# Patient Record
Sex: FEMALE | Race: BLACK OR AFRICAN AMERICAN | Employment: FULL TIME | URBAN - METROPOLITAN AREA
[De-identification: names, ages, dates, MRNs, and addresses within clinical notes are randomized per-mention and may not be internally consistent; named-entity substitution may affect disease eponyms.]

---

## 2017-10-12 ENCOUNTER — HOSPITAL ENCOUNTER (EMERGENCY)
Age: 37
Discharge: HOME | End: 2017-10-12
Payer: MEDICAID

## 2017-10-12 VITALS
TEMPERATURE: 98.2 F | OXYGEN SATURATION: 96 % | HEART RATE: 66 BPM | SYSTOLIC BLOOD PRESSURE: 130 MMHG | HEIGHT: 60 IN | BODY MASS INDEX: 43.59 KG/M2 | RESPIRATION RATE: 16 BRPM | WEIGHT: 222 LBS | DIASTOLIC BLOOD PRESSURE: 81 MMHG

## 2017-10-12 DIAGNOSIS — K59.1 FUNCTIONAL DIARRHEA: ICD-10-CM

## 2017-10-12 DIAGNOSIS — R11.2 NON-INTRACTABLE VOMITING WITH NAUSEA, UNSPECIFIED VOMITING TYPE: Primary | ICD-10-CM

## 2017-10-12 DIAGNOSIS — N39.0 URINARY TRACT INFECTION WITHOUT HEMATURIA, SITE UNSPECIFIED: ICD-10-CM

## 2017-10-12 LAB
ALBUMIN SERPL-MCNC: 4.1 G/DL (ref 3.9–4.9)
ALP BLD-CCNC: 59 U/L (ref 40–130)
ALT SERPL-CCNC: 18 U/L (ref 0–33)
ANION GAP SERPL CALCULATED.3IONS-SCNC: 14 MEQ/L (ref 7–13)
AST SERPL-CCNC: 28 U/L (ref 0–35)
BACTERIA: ABNORMAL /HPF
BILIRUB SERPL-MCNC: 0.3 MG/DL (ref 0–1.2)
BILIRUBIN URINE: NEGATIVE
BLOOD, URINE: ABNORMAL
BUN BLDV-MCNC: 9 MG/DL (ref 6–20)
CALCIUM SERPL-MCNC: 9 MG/DL (ref 8.6–10.2)
CHLORIDE BLD-SCNC: 105 MEQ/L (ref 98–107)
CLARITY: CLEAR
CO2: 21 MEQ/L (ref 22–29)
COLOR: YELLOW
CREAT SERPL-MCNC: 0.51 MG/DL (ref 0.5–0.9)
EPITHELIAL CELLS, UA: ABNORMAL /HPF
GFR AFRICAN AMERICAN: >60
GFR NON-AFRICAN AMERICAN: >60
GLOBULIN: 3.1 G/DL (ref 2.3–3.5)
GLUCOSE BLD-MCNC: 99 MG/DL (ref 74–109)
GLUCOSE URINE: NEGATIVE MG/DL
HCT VFR BLD CALC: 38 % (ref 37–47)
HEMOGLOBIN: 12.3 G/DL (ref 12–16)
KETONES, URINE: NEGATIVE MG/DL
LEUKOCYTE ESTERASE, URINE: ABNORMAL
MCH RBC QN AUTO: 27 PG (ref 27–31.3)
MCHC RBC AUTO-ENTMCNC: 32.4 % (ref 33–37)
MCV RBC AUTO: 83.5 FL (ref 82–100)
MUCUS: PRESENT
NITRITE, URINE: NEGATIVE
PDW BLD-RTO: 14.1 % (ref 11.5–14.5)
PH UA: 6 (ref 5–9)
PLATELET # BLD: 217 K/UL (ref 130–400)
POTASSIUM SERPL-SCNC: 4 MEQ/L (ref 3.5–5.1)
PROTEIN UA: NEGATIVE MG/DL
RBC # BLD: 4.56 M/UL (ref 4.2–5.4)
RBC UA: ABNORMAL /HPF (ref 0–2)
SODIUM BLD-SCNC: 140 MEQ/L (ref 132–144)
SPECIFIC GRAVITY UA: 1.03 (ref 1–1.03)
TOTAL PROTEIN: 7.2 G/DL (ref 6.4–8.1)
URINE REFLEX TO CULTURE: YES
UROBILINOGEN, URINE: 1 E.U./DL
WBC # BLD: 4.8 K/UL (ref 4.8–10.8)
WBC UA: ABNORMAL /HPF (ref 0–5)

## 2017-10-12 PROCEDURE — 96361 HYDRATE IV INFUSION ADD-ON: CPT

## 2017-10-12 PROCEDURE — 87086 URINE CULTURE/COLONY COUNT: CPT

## 2017-10-12 PROCEDURE — 99284 EMERGENCY DEPT VISIT MOD MDM: CPT

## 2017-10-12 PROCEDURE — 96374 THER/PROPH/DIAG INJ IV PUSH: CPT

## 2017-10-12 PROCEDURE — 36415 COLL VENOUS BLD VENIPUNCTURE: CPT

## 2017-10-12 PROCEDURE — 2580000003 HC RX 258: Performed by: PHYSICIAN ASSISTANT

## 2017-10-12 PROCEDURE — 81001 URINALYSIS AUTO W/SCOPE: CPT

## 2017-10-12 PROCEDURE — 85027 COMPLETE CBC AUTOMATED: CPT

## 2017-10-12 PROCEDURE — 6360000002 HC RX W HCPCS: Performed by: PHYSICIAN ASSISTANT

## 2017-10-12 PROCEDURE — 80053 COMPREHEN METABOLIC PANEL: CPT

## 2017-10-12 RX ORDER — ONDANSETRON 4 MG/1
4 TABLET, ORALLY DISINTEGRATING ORAL EVERY 8 HOURS PRN
Qty: 12 TABLET | Refills: 0 | Status: SHIPPED | OUTPATIENT
Start: 2017-10-12 | End: 2017-10-24

## 2017-10-12 RX ORDER — ONDANSETRON 2 MG/ML
4 INJECTION INTRAMUSCULAR; INTRAVENOUS ONCE
Status: COMPLETED | OUTPATIENT
Start: 2017-10-12 | End: 2017-10-12

## 2017-10-12 RX ORDER — NITROFURANTOIN 25; 75 MG/1; MG/1
100 CAPSULE ORAL 2 TIMES DAILY
Qty: 10 CAPSULE | Refills: 0 | Status: SHIPPED | OUTPATIENT
Start: 2017-10-12 | End: 2017-10-17

## 2017-10-12 RX ORDER — 0.9 % SODIUM CHLORIDE 0.9 %
1000 INTRAVENOUS SOLUTION INTRAVENOUS ONCE
Status: COMPLETED | OUTPATIENT
Start: 2017-10-12 | End: 2017-10-12

## 2017-10-12 RX ADMIN — SODIUM CHLORIDE 1000 ML: 9 INJECTION, SOLUTION INTRAVENOUS at 16:18

## 2017-10-12 RX ADMIN — ONDANSETRON 4 MG: 2 INJECTION INTRAMUSCULAR; INTRAVENOUS at 16:19

## 2017-10-12 ASSESSMENT — PAIN DESCRIPTION - PAIN TYPE: TYPE: ACUTE PAIN

## 2017-10-12 ASSESSMENT — PAIN DESCRIPTION - ORIENTATION: ORIENTATION: LOWER

## 2017-10-12 ASSESSMENT — ENCOUNTER SYMPTOMS
SHORTNESS OF BREATH: 0
ABDOMINAL PAIN: 0
NAUSEA: 1
SORE THROAT: 0
EYE DISCHARGE: 0
DIARRHEA: 1
RHINORRHEA: 0
CONSTIPATION: 0
ABDOMINAL DISTENTION: 0
COLOR CHANGE: 0
VOMITING: 1

## 2017-10-12 ASSESSMENT — PAIN DESCRIPTION - FREQUENCY: FREQUENCY: CONTINUOUS

## 2017-10-12 ASSESSMENT — PAIN DESCRIPTION - DESCRIPTORS: DESCRIPTORS: ACHING

## 2017-10-12 ASSESSMENT — PAIN SCALES - GENERAL: PAINLEVEL_OUTOF10: 6

## 2017-10-12 ASSESSMENT — PAIN DESCRIPTION - LOCATION: LOCATION: ABDOMEN

## 2017-10-12 ASSESSMENT — PAIN DESCRIPTION - ONSET: ONSET: ON-GOING

## 2017-10-12 NOTE — ED NOTES
Pt was difficult Iv access had to use vein light and assistance of another nurse     Linda Weston RN  10/12/17 9999

## 2017-10-12 NOTE — ED PROVIDER NOTES
of systems was reviewed and negative. PAST MEDICAL HISTORY   History reviewed. No pertinent past medical history. SURGICAL HISTORY     History reviewed. No pertinent surgical history. CURRENT MEDICATIONS       Previous Medications    No medications on file       ALLERGIES     Metronidazole    FAMILY HISTORY     No family history on file. SOCIAL HISTORY       Social History     Social History    Marital status: Single     Spouse name: N/A    Number of children: N/A    Years of education: N/A     Social History Main Topics    Smoking status: Former Smoker    Smokeless tobacco: Former User    Alcohol use Yes      Comment: On \"occasion liquor\"    Drug use: No    Sexual activity: Not Asked     Other Topics Concern    None     Social History Narrative    None       SCREENINGS             PHYSICAL EXAM    (up to 7 for level 4, 8 or more for level 5)     ED Triage Vitals [10/12/17 1454]   BP Temp Temp Source Pulse Resp SpO2 Height Weight   133/89 98.2 °F (36.8 °C) Oral 81 18 98 % 5' (1.524 m) 222 lb (100.7 kg)       Physical Exam   Constitutional: She is oriented to person, place, and time. She appears well-developed and well-nourished. Displays no acute distress   HENT:   Head: Normocephalic. Eyes: Pupils are equal, round, and reactive to light. Neck: Normal range of motion. Neck supple. No JVD present. No tracheal deviation present. Cardiovascular: Normal rate. Pulmonary/Chest: Effort normal. No respiratory distress. She has no wheezes. She has no rales. She exhibits no tenderness. Abdominal: Soft. Bowel sounds are normal. She exhibits no distension and no mass. There is no tenderness. There is no rebound and no guarding. Musculoskeletal: Normal range of motion. She exhibits no edema or deformity. Neurological: She is alert and oriented to person, place, and time. Coordination normal.   Skin: Skin is warm and dry. Psychiatric: She has a normal mood and affect. DIAGNOSTIC RESULTS     EKG: All EKG's are interpreted by the Emergency Department Physician who either signs or Co-signs this chart in the absence of a cardiologist.        RADIOLOGY:   Non-plain film images such as CT, Ultrasound and MRI are read by the radiologist. Plain radiographic images are visualized and preliminarily interpreted by the emergency physician with the below findings:        Interpretation per the Radiologist below, if available at the time of this note:    No orders to display         ED BEDSIDE ULTRASOUND:   Performed by ED Physician - none    LABS:  Labs Reviewed   COMPREHENSIVE METABOLIC PANEL - Abnormal; Notable for the following:        Result Value    CO2 21 (*)     Anion Gap 14 (*)     All other components within normal limits   CBC - Abnormal; Notable for the following:     MCHC 32.4 (*)     All other components within normal limits   URINE RT REFLEX TO CULTURE - Abnormal; Notable for the following:     Blood, Urine LARGE (*)     Leukocyte Esterase, Urine SMALL (*)     All other components within normal limits   MICROSCOPIC URINALYSIS - Abnormal; Notable for the following:     RBC, UA 3-5 (*)     All other components within normal limits   URINE CULTURE       All other labs were within normal range or not returned as of this dictation. EMERGENCY DEPARTMENT COURSE and DIFFERENTIAL DIAGNOSIS/MDM:   Vitals:    Vitals:    10/12/17 1454   BP: 133/89   Pulse: 81   Resp: 18   Temp: 98.2 °F (36.8 °C)   TempSrc: Oral   SpO2: 98%   Weight: 222 lb (100.7 kg)   Height: 5' (1.524 m)         ED Course      MDM  Number of Diagnoses or Management Options  Functional diarrhea:   Non-intractable vomiting with nausea, unspecified vomiting type:   Urinary tract infection without hematuria, site unspecified:   Diagnosis management comments: Patient displayed no episodes of vomiting or diarrhea while in the emergency department labs reviewed symptoms are most consistent with that of viral syndrome. Patient was given a prescription for Zofran as well as Macrobid K she didn't have leukocytes in her urine so will cover for urinary tract infection. Patient is also requesting a work excuse which she was given and advised to follow up with her regular family physician next 48 hours. CRITICAL CARE TIME   Total Critical Care time was 0 minutes, excluding separately reportable procedures. There was a high probability of clinically significant/life threatening deterioration in the patient's condition which required my urgent intervention. CONSULTS:  None    PROCEDURES:  Unless otherwise noted below, none     Procedures    FINAL IMPRESSION      1. Non-intractable vomiting with nausea, unspecified vomiting type    2. Functional diarrhea    3. Urinary tract infection without hematuria, site unspecified          DISPOSITION/PLAN   DISPOSITION Decision to Discharge    PATIENT REFERRED TO:  [facility]    In 2 days        DISCHARGE MEDICATIONS:  New Prescriptions    NITROFURANTOIN, MACROCRYSTAL-MONOHYDRATE, (MACROBID) 100 MG CAPSULE    Take 1 capsule by mouth 2 times daily for 5 days    ONDANSETRON (ZOFRAN ODT) 4 MG DISINTEGRATING TABLET    Take 1 tablet by mouth every 8 hours as needed for Nausea     Attestation: The Prescription Monitoring Report for this patient was reviewed today.  (Linh Zamudio PA-C)    (Please note that portions of this note were completed with a voice recognition program.  Efforts were made to edit the dictations but occasionally words are mis-transcribed.)    Lihn Zamudio PA-C (electronically signed)  Attending Emergency Physician         Linh Zamudio PA-C  10/12/17 [address] [place] [address]

## 2017-10-14 LAB — URINE CULTURE, ROUTINE: NORMAL

## 2017-12-19 ENCOUNTER — HOSPITAL ENCOUNTER (EMERGENCY)
Age: 37
Discharge: HOME | End: 2017-12-19
Payer: MEDICARE

## 2017-12-19 VITALS
SYSTOLIC BLOOD PRESSURE: 130 MMHG | BODY MASS INDEX: 44.14 KG/M2 | RESPIRATION RATE: 16 BRPM | TEMPERATURE: 97.7 F | HEART RATE: 73 BPM | OXYGEN SATURATION: 98 % | WEIGHT: 226 LBS | DIASTOLIC BLOOD PRESSURE: 91 MMHG

## 2017-12-19 DIAGNOSIS — R11.2 NON-INTRACTABLE VOMITING WITH NAUSEA, UNSPECIFIED VOMITING TYPE: Primary | ICD-10-CM

## 2017-12-19 PROCEDURE — 99283 EMERGENCY DEPT VISIT LOW MDM: CPT

## 2017-12-19 ASSESSMENT — ENCOUNTER SYMPTOMS
COLOR CHANGE: 0
SORE THROAT: 0
VOMITING: 1
BACK PAIN: 0
EYE DISCHARGE: 0
ABDOMINAL DISTENTION: 0
CONSTIPATION: 0
NAUSEA: 1
ABDOMINAL PAIN: 0
SHORTNESS OF BREATH: 0
RHINORRHEA: 0

## 2017-12-19 NOTE — ED TRIAGE NOTES
Pt c/o nausea, vomiting and diarrhea. Was seen at [facility] and given one day off of work pt went to work today and vomited. 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

## 2017-12-19 NOTE — ED PROVIDER NOTES
[facility] ED  eMERGENCY dEPARTMENT eNCOUnter      Pt Name: Nickolas Talley  MRN: [id number]  [place] 1980  Date of evaluation: 12/19/2017  Provider: Richardson Montana PA-C    CHIEF COMPLAINT       Chief Complaint   Patient presents with    Emesis     diarrhea         HISTORY OF PRESENT ILLNESS   (Location/Symptom, Timing/Onset, Context/Setting, Quality, Duration, Modifying Factors, Severity)  Note limiting factors. Nickolas Talley is a 40 y.o. female who presents to the emergency department With the complaint of nausea and vomiting. Patient states that nausea vomiting started yesterday for her she was seen at [facility] she was given a prescription for Zofran. She states she is still feeling nauseated this time she was supposed to go back in to work today but she works at [employer] and she feels that she is not able to do her job safely defer nausea and vomiting, and is requesting additional work note for today off. She states she is able to take oral fluids but is still having intermittent periods of nausea which is controlled by her Zofran is given by [facility]. She has no other additional complaints no chest pain or shortness of breath no dizziness no fevers. HPI    Nursing Notes were reviewed. REVIEW OF SYSTEMS    (2-9 systems for level 4, 10 or more for level 5)     Review of Systems   Constitutional: Negative for activity change, appetite change, fatigue and fever. HENT: Negative for congestion, ear discharge, ear pain, nosebleeds, rhinorrhea and sore throat. Eyes: Negative for discharge. Respiratory: Negative for shortness of breath. Cardiovascular: Negative for chest pain, palpitations and leg swelling. Gastrointestinal: Positive for nausea and vomiting. Negative for abdominal distention, abdominal pain and constipation. Genitourinary: Negative for difficulty urinating, dysuria, flank pain and urgency.    Musculoskeletal: Negative for arthralgias, back pain, gait problem, joint swelling, myalgias and neck pain. Skin: Negative for color change, pallor, rash and wound. Neurological: Negative for dizziness, tremors, syncope, weakness, numbness and headaches. Psychiatric/Behavioral: Negative for agitation and confusion. Except as noted above the remainder of the review of systems was reviewed and negative. PAST MEDICAL HISTORY   History reviewed. No pertinent past medical history. SURGICAL HISTORY     History reviewed. No pertinent surgical history. CURRENT MEDICATIONS       Previous Medications    No medications on file       ALLERGIES     Metronidazole    FAMILY HISTORY     History reviewed. No pertinent family history. SOCIAL HISTORY       Social History     Social History    Marital status: Single     Spouse name: N/A    Number of children: N/A    Years of education: N/A     Social History Main Topics    Smoking status: Former Smoker    Smokeless tobacco: Former User    Alcohol use Yes      Comment: On \"occasion liquor\"    Drug use: No    Sexual activity: Not Asked     Other Topics Concern    None     Social History Narrative    None       SCREENINGS             PHYSICAL EXAM    (up to 7 for level 4, 8 or more for level 5)     ED Triage Vitals [12/19/17 0716]   BP Temp Temp Source Pulse Resp SpO2 Height Weight   (!) 130/91 97.7 °F (36.5 °C) Oral 73 16 98 % -- 226 lb (102.5 kg)       Physical Exam   Constitutional: She is oriented to person, place, and time. She appears well-developed and well-nourished. HENT:   Head: Normocephalic. Eyes: EOM are normal. Pupils are equal, round, and reactive to light. Neck: Normal range of motion. Neck supple. No JVD present. No tracheal deviation present. Cardiovascular: Normal rate. Pulmonary/Chest: Effort normal. No respiratory distress. She has no wheezes. She has no rales. She exhibits no tenderness. Abdominal: Soft.  Bowel sounds are normal. She exhibits no distension and no mass. There is no tenderness. There is no rebound and no guarding. Musculoskeletal: Normal range of motion. She exhibits no edema or deformity. Neurological: She is oriented to person, place, and time. Coordination normal.   Skin: Skin is warm and dry. Psychiatric: She has a normal mood and affect. DIAGNOSTIC RESULTS     EKG: All EKG's are interpreted by the Emergency Department Physician who either signs or Co-signs this chart in the absence of a cardiologist.        RADIOLOGY:   Non-plain film images such as CT, Ultrasound and MRI are read by the radiologist. Plain radiographic images are visualized and preliminarily interpreted by the emergency physician with the below findings:        Interpretation per the Radiologist below, if available at the time of this note:    No orders to display         ED BEDSIDE ULTRASOUND:   Performed by ED Physician - none    LABS:  Labs Reviewed - No data to display    All other labs were within normal range or not returned as of this dictation. EMERGENCY DEPARTMENT COURSE and DIFFERENTIAL DIAGNOSIS/MDM:   Vitals:    Vitals:    12/19/17 0716   BP: (!) 130/91   Pulse: 73   Resp: 16   Temp: 97.7 °F (36.5 °C)   TempSrc: Oral   SpO2: 98%   Weight: 226 lb (102.5 kg)         ED Course      MDM  Number of Diagnoses or Management Options  Non-intractable vomiting with nausea, unspecified vomiting type:   Diagnosis management comments: Patient visit today is only requesting a work note for an additional day off due to her nausea and vomiting. She states this is been ongoing for the last 2 days and she was supposed to return to work today but she feels that since she works at [email or web address] she could not complete her job due to her nausea and vomiting which still remains.   She was seen in [facility]"" yesterday she was given a prescription for Zofran she states that Zofran doesn't work for her but she still has breakthrough and She gets nauseated. She feels she cannot complete her job safely and requests additional work note for today off as well. She has no other additional complaints. She states she does have medications remaining from her visit [facility] yesterday she does not need a refill. CRITICAL CARE TIME   Total Critical Care time was 0 minutes, excluding separately reportable procedures. There was a high probability of clinically significant/life threatening deterioration in the patient's condition which required my urgent intervention. CONSULTS:  None    PROCEDURES:  Unless otherwise noted below, none     Procedures    FINAL IMPRESSION      1.  Non-intractable vomiting with nausea, unspecified vomiting type          DISPOSITION/PLAN   DISPOSITION Decision to Discharge    PATIENT REFERRED TO:  [facility]    In 2 days        DISCHARGE MEDICATIONS:  New Prescriptions    No medications on file          (Please note that portions of this note were completed with a voice recognition program.  Efforts were made to edit the dictations but occasionally words are mis-transcribed.)    Omaira Coyne PA-C (electronically signed)  Attending Emergency Physician         Omaira Coyne PA-C  12/19/17 5499

## 2018-09-03 ENCOUNTER — HOSPITAL ENCOUNTER (EMERGENCY)
Age: 38
Discharge: HOME | End: 2018-09-04
Payer: MEDICARE

## 2018-09-03 VITALS
DIASTOLIC BLOOD PRESSURE: 83 MMHG | BODY MASS INDEX: 47.12 KG/M2 | HEIGHT: 60 IN | HEART RATE: 69 BPM | TEMPERATURE: 98.2 F | WEIGHT: 240 LBS | RESPIRATION RATE: 17 BRPM | SYSTOLIC BLOOD PRESSURE: 134 MMHG | OXYGEN SATURATION: 96 %

## 2018-09-03 DIAGNOSIS — R19.7 NAUSEA VOMITING AND DIARRHEA: ICD-10-CM

## 2018-09-03 DIAGNOSIS — R11.2 NAUSEA VOMITING AND DIARRHEA: ICD-10-CM

## 2018-09-03 DIAGNOSIS — N30.00 ACUTE CYSTITIS WITHOUT HEMATURIA: Primary | ICD-10-CM

## 2018-09-03 LAB
ALBUMIN SERPL-MCNC: 4.1 G/DL (ref 3.9–4.9)
ALP BLD-CCNC: 67 U/L (ref 40–130)
ALT SERPL-CCNC: 16 U/L (ref 0–33)
AMYLASE: 71 U/L (ref 28–100)
ANION GAP SERPL CALCULATED.3IONS-SCNC: 11 MEQ/L (ref 7–13)
AST SERPL-CCNC: 24 U/L (ref 0–35)
BASOPHILS ABSOLUTE: 0.1 K/UL (ref 0–0.2)
BASOPHILS RELATIVE PERCENT: 1.1 %
BILIRUB SERPL-MCNC: <0.2 MG/DL (ref 0–1.2)
BILIRUBIN URINE: NEGATIVE
BLOOD, URINE: NEGATIVE
BUN BLDV-MCNC: 6 MG/DL (ref 6–20)
CALCIUM SERPL-MCNC: 9.4 MG/DL (ref 8.6–10.2)
CHLORIDE BLD-SCNC: 101 MEQ/L (ref 98–107)
CLARITY: ABNORMAL
CO2: 24 MEQ/L (ref 22–29)
COLOR: YELLOW
CREAT SERPL-MCNC: 0.61 MG/DL (ref 0.5–0.9)
EOSINOPHILS ABSOLUTE: 0.1 K/UL (ref 0–0.7)
EOSINOPHILS RELATIVE PERCENT: 2.1 %
GFR AFRICAN AMERICAN: >60
GFR NON-AFRICAN AMERICAN: >60
GLOBULIN: 3.5 G/DL (ref 2.3–3.5)
GLUCOSE BLD-MCNC: 94 MG/DL (ref 74–109)
GLUCOSE URINE: 100 MG/DL
HCT VFR BLD CALC: 38.4 % (ref 37–47)
HEMOGLOBIN: 12.8 G/DL (ref 12–16)
KETONES, URINE: NEGATIVE MG/DL
LEUKOCYTE ESTERASE, URINE: ABNORMAL
LIPASE: 44 U/L (ref 13–60)
LYMPHOCYTES ABSOLUTE: 2.3 K/UL (ref 1–4.8)
LYMPHOCYTES RELATIVE PERCENT: 38.6 %
MCH RBC QN AUTO: 27.7 PG (ref 27–31.3)
MCHC RBC AUTO-ENTMCNC: 33.4 % (ref 33–37)
MCV RBC AUTO: 82.9 FL (ref 82–100)
MONOCYTES ABSOLUTE: 0.4 K/UL (ref 0.2–0.8)
MONOCYTES RELATIVE PERCENT: 6.3 %
NEUTROPHILS ABSOLUTE: 3.1 K/UL (ref 1.4–6.5)
NEUTROPHILS RELATIVE PERCENT: 51.9 %
NITRITE, URINE: NEGATIVE
PDW BLD-RTO: 15.2 % (ref 11.5–14.5)
PH UA: 6 (ref 5–9)
PLATELET # BLD: 232 K/UL (ref 130–400)
POTASSIUM SERPL-SCNC: 3.7 MEQ/L (ref 3.5–5.1)
PROTEIN UA: NEGATIVE MG/DL
RBC # BLD: 4.63 M/UL (ref 4.2–5.4)
SODIUM BLD-SCNC: 136 MEQ/L (ref 132–144)
SPECIFIC GRAVITY UA: 1.02 (ref 1–1.03)
TOTAL PROTEIN: 7.6 G/DL (ref 6.4–8.1)
URINE REFLEX TO CULTURE: YES
UROBILINOGEN, URINE: 0.2 E.U./DL
WBC # BLD: 6 K/UL (ref 4.8–10.8)

## 2018-09-03 PROCEDURE — 85025 COMPLETE CBC W/AUTO DIFF WBC: CPT

## 2018-09-03 PROCEDURE — 99284 EMERGENCY DEPT VISIT MOD MDM: CPT

## 2018-09-03 PROCEDURE — 87086 URINE CULTURE/COLONY COUNT: CPT

## 2018-09-03 PROCEDURE — 82150 ASSAY OF AMYLASE: CPT

## 2018-09-03 PROCEDURE — 36415 COLL VENOUS BLD VENIPUNCTURE: CPT

## 2018-09-03 PROCEDURE — 96372 THER/PROPH/DIAG INJ SC/IM: CPT

## 2018-09-03 PROCEDURE — 83690 ASSAY OF LIPASE: CPT

## 2018-09-03 PROCEDURE — 81001 URINALYSIS AUTO W/SCOPE: CPT

## 2018-09-03 PROCEDURE — 80053 COMPREHEN METABOLIC PANEL: CPT

## 2018-09-03 PROCEDURE — 6360000002 HC RX W HCPCS: Performed by: PHYSICIAN ASSISTANT

## 2018-09-03 RX ORDER — DICYCLOMINE HYDROCHLORIDE 10 MG/ML
20 INJECTION INTRAMUSCULAR ONCE
Status: COMPLETED | OUTPATIENT
Start: 2018-09-03 | End: 2018-09-03

## 2018-09-03 RX ORDER — ONDANSETRON 4 MG/1
4 TABLET, ORALLY DISINTEGRATING ORAL ONCE
Status: COMPLETED | OUTPATIENT
Start: 2018-09-03 | End: 2018-09-03

## 2018-09-03 RX ADMIN — DICYCLOMINE HYDROCHLORIDE 20 MG: 20 INJECTION, SOLUTION INTRAMUSCULAR at 23:41

## 2018-09-03 RX ADMIN — ONDANSETRON 4 MG: 4 TABLET, ORALLY DISINTEGRATING ORAL at 23:41

## 2018-09-03 ASSESSMENT — ENCOUNTER SYMPTOMS
APNEA: 0
NAUSEA: 1
ABDOMINAL PAIN: 0
EYE PAIN: 0
BLOOD IN STOOL: 0
COLOR CHANGE: 0
TROUBLE SWALLOWING: 0
ALLERGIC/IMMUNOLOGIC NEGATIVE: 1
DIARRHEA: 1
ANAL BLEEDING: 0
SHORTNESS OF BREATH: 0
VOMITING: 1

## 2018-09-03 ASSESSMENT — PAIN DESCRIPTION - ORIENTATION: ORIENTATION: LOWER

## 2018-09-03 ASSESSMENT — PAIN DESCRIPTION - LOCATION: LOCATION: ABDOMEN

## 2018-09-03 ASSESSMENT — PAIN SCALES - GENERAL: PAINLEVEL_OUTOF10: 8

## 2018-09-03 ASSESSMENT — PAIN DESCRIPTION - DESCRIPTORS: DESCRIPTORS: ACHING

## 2018-09-03 ASSESSMENT — PAIN DESCRIPTION - PAIN TYPE: TYPE: ACUTE PAIN

## 2018-09-04 LAB
BACTERIA: ABNORMAL /HPF
EPITHELIAL CELLS, UA: ABNORMAL /HPF
RBC UA: ABNORMAL /HPF (ref 0–2)
RENAL EPITHELIAL, UA: ABNORMAL /HPF
WBC UA: ABNORMAL /HPF (ref 0–5)

## 2018-09-04 PROCEDURE — 6370000000 HC RX 637 (ALT 250 FOR IP): Performed by: PHYSICIAN ASSISTANT

## 2018-09-04 RX ORDER — CEPHALEXIN 500 MG/1
500 CAPSULE ORAL 4 TIMES DAILY
Qty: 40 CAPSULE | Refills: 0 | Status: SHIPPED | OUTPATIENT
Start: 2018-09-04 | End: 2020-01-23 | Stop reason: CLARIF

## 2018-09-04 RX ORDER — CEPHALEXIN 500 MG/1
500 CAPSULE ORAL ONCE
Status: COMPLETED | OUTPATIENT
Start: 2018-09-04 | End: 2018-09-04

## 2018-09-04 RX ORDER — DICYCLOMINE HYDROCHLORIDE 10 MG/1
10 CAPSULE ORAL EVERY 6 HOURS PRN
Qty: 20 CAPSULE | Refills: 0 | Status: ON HOLD | OUTPATIENT
Start: 2018-09-04 | End: 2020-03-11 | Stop reason: ALTCHOICE

## 2018-09-04 RX ORDER — ONDANSETRON 4 MG/1
4 TABLET, ORALLY DISINTEGRATING ORAL EVERY 8 HOURS PRN
Qty: 20 TABLET | Refills: 0 | Status: SHIPPED | OUTPATIENT
Start: 2018-09-04 | End: 2021-03-03

## 2018-09-04 RX ADMIN — CEPHALEXIN 500 MG: 500 CAPSULE ORAL at 00:20

## 2018-09-04 NOTE — ED PROVIDER NOTES
[facility] ED  eMERGENCY dEPARTMENT eNCOUnter      Pt Name: Brittany Blanc  MRN: [id number]  [place] 1980  Date of evaluation: 9/3/2018  Provider: Jeffry Hernandes PA-C    CHIEF COMPLAINT       Chief Complaint   Patient presents with    Emesis     and diarrhea since this morning         HISTORY OF PRESENT ILLNESS  (Location/Symptom, Timing/Onset, Context/Setting, Quality, Duration, Modifying Factors, Severity.)   Brittany Blanc is a 41 y.o. female who presents to the emergency department With complaints of nausea, vomiting and diarrhea that started last night. Patient denies any abdominal pain. Patient denies any known fevers. Patient states that the symptoms had improved this evening however she called a full work and needs a work note. Patient denies any cough, congestion, chest pain, shortness of breath, headaches or dizziness. Patient is tolerating fluids at home. HPI    Nursing Notes were reviewed and I agree. REVIEW OF SYSTEMS    (2-9 systems for level 4, 10 or more for level 5)     Review of Systems   Constitutional: Negative for diaphoresis and fever. HENT: Negative for hearing loss and trouble swallowing. Eyes: Negative for pain. Respiratory: Negative for apnea and shortness of breath. Cardiovascular: Negative for chest pain. Gastrointestinal: Positive for diarrhea, nausea and vomiting. Negative for abdominal pain, anal bleeding and blood in stool. Endocrine: Negative. Genitourinary: Negative for hematuria. Musculoskeletal: Negative for neck pain and neck stiffness. Skin: Negative for color change. Allergic/Immunologic: Negative. Neurological: Negative for dizziness and numbness. Hematological: Negative. Psychiatric/Behavioral: Negative. All other systems reviewed and are negative. Except as noted above the remainder of the review of systems was reviewed and negative. PAST MEDICAL HISTORY   History reviewed.  No pertinent past

## 2018-09-05 LAB — URINE CULTURE, ROUTINE: NORMAL

## 2020-01-23 ENCOUNTER — OFFICE VISIT (OUTPATIENT)
Dept: OBGYN CLINIC | Age: 40
End: 2020-01-23
Payer: MEDICARE

## 2020-01-23 VITALS
WEIGHT: 254 LBS | DIASTOLIC BLOOD PRESSURE: 78 MMHG | HEIGHT: 72 IN | BODY MASS INDEX: 34.4 KG/M2 | SYSTOLIC BLOOD PRESSURE: 126 MMHG

## 2020-01-23 DIAGNOSIS — N91.5 OLIGOMENORRHEA, UNSPECIFIED TYPE: ICD-10-CM

## 2020-01-23 DIAGNOSIS — N92.6 IRREGULAR PERIODS/MENSTRUAL CYCLES: ICD-10-CM

## 2020-01-23 DIAGNOSIS — D25.9 UTERINE LEIOMYOMA, UNSPECIFIED LOCATION: ICD-10-CM

## 2020-01-23 LAB
ALBUMIN SERPL-MCNC: 4.2 G/DL (ref 3.5–4.6)
ALP BLD-CCNC: 74 U/L (ref 40–130)
ALT SERPL-CCNC: 21 U/L (ref 0–33)
ANION GAP SERPL CALCULATED.3IONS-SCNC: 20 MEQ/L (ref 9–15)
AST SERPL-CCNC: 22 U/L (ref 0–35)
BASOPHILS ABSOLUTE: 0 K/UL (ref 0–0.2)
BASOPHILS RELATIVE PERCENT: 0.4 %
BILIRUB SERPL-MCNC: 0.3 MG/DL (ref 0.2–0.7)
BUN BLDV-MCNC: 12 MG/DL (ref 6–20)
CALCIUM SERPL-MCNC: 9.3 MG/DL (ref 8.5–9.9)
CHLORIDE BLD-SCNC: 94 MEQ/L (ref 95–107)
CHOLESTEROL, TOTAL: 158 MG/DL (ref 0–199)
CO2: 21 MEQ/L (ref 20–31)
CREAT SERPL-MCNC: 0.61 MG/DL (ref 0.5–0.9)
EOSINOPHILS ABSOLUTE: 0.1 K/UL (ref 0–0.7)
EOSINOPHILS RELATIVE PERCENT: 2.6 %
FOLLICLE STIMULATING HORMONE: 5 MIU/ML
GFR AFRICAN AMERICAN: >60
GFR NON-AFRICAN AMERICAN: >60
GLOBULIN: 3.8 G/DL (ref 2.3–3.5)
GLUCOSE BLD-MCNC: 95 MG/DL (ref 70–99)
GONADOTROPIN, CHORIONIC (HCG) QUANT: <0.1 MIU/ML
HCT VFR BLD CALC: 41.1 % (ref 37–47)
HDLC SERPL-MCNC: 45 MG/DL (ref 40–59)
HEMOGLOBIN: 13.4 G/DL (ref 12–16)
LDL CHOLESTEROL CALCULATED: 88 MG/DL (ref 0–129)
LYMPHOCYTES ABSOLUTE: 1.8 K/UL (ref 1–4.8)
LYMPHOCYTES RELATIVE PERCENT: 36.3 %
MCH RBC QN AUTO: 27.7 PG (ref 27–31.3)
MCHC RBC AUTO-ENTMCNC: 32.7 % (ref 33–37)
MCV RBC AUTO: 84.7 FL (ref 82–100)
MONOCYTES ABSOLUTE: 0.4 K/UL (ref 0.2–0.8)
MONOCYTES RELATIVE PERCENT: 7.7 %
NEUTROPHILS ABSOLUTE: 2.6 K/UL (ref 1.4–6.5)
NEUTROPHILS RELATIVE PERCENT: 53 %
PDW BLD-RTO: 15.5 % (ref 11.5–14.5)
PLATELET # BLD: 260 K/UL (ref 130–400)
POTASSIUM SERPL-SCNC: 3.6 MEQ/L (ref 3.4–4.9)
PROLACTIN: 9.7 NG/ML
RBC # BLD: 4.85 M/UL (ref 4.2–5.4)
SODIUM BLD-SCNC: 135 MEQ/L (ref 135–144)
T4 FREE: 1.29 NG/DL (ref 0.84–1.68)
TOTAL PROTEIN: 8 G/DL (ref 6.3–8)
TRIGL SERPL-MCNC: 123 MG/DL (ref 0–150)
TSH SERPL DL<=0.05 MIU/L-ACNC: 2.79 UIU/ML (ref 0.44–3.86)
WBC # BLD: 4.9 K/UL (ref 4.8–10.8)

## 2020-01-23 PROCEDURE — G8419 CALC BMI OUT NRM PARAM NOF/U: HCPCS | Performed by: OBSTETRICS & GYNECOLOGY

## 2020-01-23 PROCEDURE — G8484 FLU IMMUNIZE NO ADMIN: HCPCS | Performed by: OBSTETRICS & GYNECOLOGY

## 2020-01-23 PROCEDURE — G8427 DOCREV CUR MEDS BY ELIG CLIN: HCPCS | Performed by: OBSTETRICS & GYNECOLOGY

## 2020-01-23 PROCEDURE — 1036F TOBACCO NON-USER: CPT | Performed by: OBSTETRICS & GYNECOLOGY

## 2020-01-23 PROCEDURE — 99203 OFFICE O/P NEW LOW 30 MIN: CPT | Performed by: OBSTETRICS & GYNECOLOGY

## 2020-01-23 RX ORDER — OMEGA-3 FATTY ACIDS/FISH OIL 300-1000MG
200 CAPSULE ORAL
Status: ON HOLD | COMMUNITY
End: 2020-03-12 | Stop reason: HOSPADM

## 2020-01-23 RX ORDER — OMEPRAZOLE 20 MG/1
20 CAPSULE, DELAYED RELEASE ORAL DAILY
Status: ON HOLD | COMMUNITY
Start: 2019-09-17 | End: 2021-09-29 | Stop reason: HOSPADM

## 2020-01-23 ASSESSMENT — ENCOUNTER SYMPTOMS
NAUSEA: 0
DIARRHEA: 0
ANAL BLEEDING: 0
BLOOD IN STOOL: 0
EYES NEGATIVE: 1
VOMITING: 0
RECTAL PAIN: 0
ABDOMINAL PAIN: 0
CONSTIPATION: 0
ALLERGIC/IMMUNOLOGIC NEGATIVE: 1
ABDOMINAL DISTENTION: 0
RESPIRATORY NEGATIVE: 1

## 2020-01-23 NOTE — PROGRESS NOTES
Patient here c/o irregular cycles. New patient; reviewed medical, surgical, social and family history. Also reviewed current medications and allergies. Patient states cycles are very irregular. States 2 cycles last year. Last cycle 11/19. Cycles last 4-7 days and heavy. H/O fibroids for many years. Last imaging 2016. Last annual exam / pap > 1 year ago. Discussed how weight may affect cycles and issues with unopposed estrogen production in obesity. Ordered labs and US for further evaluation. F/U annual exam / results and to discuss options for cycle regulation if desires. All questions answered. F/U as directed. Pt was seen with total face to face time of 30 minutes with more than 50% of the visit being counseling and education regarding encounter dx of oligomenorrhea and fibroids. See discussion /counseling details as stated above. Vitals:  /78   Ht 6' 9\" (2.057 m)   Wt 254 lb (115.2 kg)   LMP 11/25/2019   BMI 27.22 kg/m²   Past Medical History:   Diagnosis Date    Abnormal Pap smear of cervix     Breast disorder     Fibroid      Past Surgical History:   Procedure Laterality Date    FOOT SURGERY      cyst on right foot as a child     Allergies:  Metronidazole  Current Outpatient Medications   Medication Sig Dispense Refill    omeprazole (PRILOSEC) 20 MG delayed release capsule Take 20 mg by mouth      dicyclomine (BENTYL) 10 MG capsule Take 1 capsule by mouth every 6 hours as needed (cramps) 20 capsule 0    ibuprofen (ADVIL;MOTRIN) 200 MG CAPS Take 200 mg by mouth      ondansetron (ZOFRAN ODT) 4 MG disintegrating tablet Take 1 tablet by mouth every 8 hours as needed for Nausea (Patient not taking: Reported on 1/23/2020) 20 tablet 0     No current facility-administered medications for this visit.       Social History     Socioeconomic History    Marital status: Single     Spouse name: Not on file    Number of children: Not on file    Years of education: Not on file    Highest education level: Not on file   Occupational History    Not on file   Social Needs    Financial resource strain: Not on file    Food insecurity:     Worry: Not on file     Inability: Not on file    Transportation needs:     Medical: Not on file     Non-medical: Not on file   Tobacco Use    Smoking status: Former Smoker    Smokeless tobacco: Former User   Substance and Sexual Activity    Alcohol use: Yes     Comment: On \"occasion liquor\"    Drug use: No    Sexual activity: Yes     Partners: Male   Lifestyle    Physical activity:     Days per week: Not on file     Minutes per session: Not on file    Stress: Not on file   Relationships    Social connections:     Talks on phone: Not on file     Gets together: Not on file     Attends Christianity service: Not on file     Active member of club or organization: Not on file     Attends meetings of clubs or organizations: Not on file     Relationship status: Not on file    Intimate partner violence:     Fear of current or ex partner: Not on file     Emotionally abused: Not on file     Physically abused: Not on file     Forced sexual activity: Not on file   Other Topics Concern    Not on file   Social History Narrative    Not on file        Family History   Problem Relation Age of Onset    Diabetes Paternal Grandmother     Diabetes Father     Diabetes Paternal Aunt     Diabetes Paternal Uncle     Breast Cancer Neg Hx     Cancer Neg Hx     Colon Cancer Neg Hx     Eclampsia Neg Hx     Ovarian Cancer Neg Hx     Hypertension Neg Hx      Labor Neg Hx     Spont Abortions Neg Hx     Stroke Neg Hx        Review of Systems   Constitutional: Negative. Negative for activity change, appetite change, chills, diaphoresis, fatigue, fever and unexpected weight change. HENT: Negative. Eyes: Negative. Respiratory: Negative. Cardiovascular: Negative.     Gastrointestinal: Negative for abdominal distention, abdominal pain, anal bleeding, blood in stool, constipation, diarrhea, nausea, rectal pain and vomiting. Endocrine: Negative. Genitourinary: Positive for menstrual problem (Irregular cycles). Negative for decreased urine volume, difficulty urinating, dyspareunia, dysuria, enuresis, flank pain, frequency, genital sores, hematuria, pelvic pain, urgency, vaginal bleeding, vaginal discharge and vaginal pain. Musculoskeletal: Negative. Skin: Negative. Allergic/Immunologic: Negative. Neurological: Negative. Hematological: Negative. Psychiatric/Behavioral: Negative. Objective:     Physical Exam  Constitutional:       General: She is not in acute distress. Appearance: She is well-developed. She is not diaphoretic. HENT:      Head: Normocephalic and atraumatic. Eyes:      Conjunctiva/sclera: Conjunctivae normal.   Neck:      Musculoskeletal: Normal range of motion and neck supple. Cardiovascular:      Rate and Rhythm: Normal rate and regular rhythm. Pulmonary:      Effort: Pulmonary effort is normal. No respiratory distress. Musculoskeletal: Normal range of motion. General: No tenderness or deformity. Skin:     General: Skin is warm and dry. Coloration: Skin is not pale. Neurological:      Mental Status: She is alert and oriented to person, place, and time. Motor: No abnormal muscle tone. Coordination: Coordination normal.   Psychiatric:         Behavior: Behavior normal.         Thought Content: Thought content normal.         Judgment: Judgment normal.         Assessment:          Diagnosis Orders   1. Irregular periods/menstrual cycles  CBC Auto Differential    Follicle Stimulating Hormone    HCG, Quantitative, Pregnancy    T4, Free    TSH without Reflex    US Pelvis Complete    US Non OB Transvaginal        Plan:      Medications placedthis encounter:  No orders of the defined types were placed in this encounter.         Orders placedthis encounter:  Orders Placed This Encounter   Procedures

## 2020-01-27 ENCOUNTER — PATIENT MESSAGE (OUTPATIENT)
Dept: OBGYN CLINIC | Age: 40
End: 2020-01-27

## 2020-01-27 LAB — DHEAS (DHEA SULFATE): 55.3 UG/DL (ref 45–270)

## 2020-01-27 NOTE — TELEPHONE ENCOUNTER
From: Teresita Ballard  To:  Ilda Faulkner MD  Sent: 1/27/2020 2:21 PM EST  Subject: Test Results Question    Okay what are my other results saying

## 2020-01-28 LAB
SEX HORMONE BINDING GLOBULIN: 24 NMOL/L (ref 30–135)
TESTOSTERONE FREE: 3.9 PG/ML (ref 1.3–9.2)
TESTOSTERONE, FEMALES/CHILDREN: 20 NG/DL (ref 9–55)

## 2020-02-05 ENCOUNTER — HOSPITAL ENCOUNTER (OUTPATIENT)
Dept: GENERAL RADIOLOGY | Age: 40
Discharge: HOME | End: 2020-02-07
Payer: MEDICARE

## 2020-02-05 PROCEDURE — 72110 X-RAY EXAM L-2 SPINE 4/>VWS: CPT

## 2020-02-05 PROCEDURE — 71046 X-RAY EXAM CHEST 2 VIEWS: CPT

## 2020-02-05 PROCEDURE — 72072 X-RAY EXAM THORAC SPINE 3VWS: CPT

## 2020-02-08 ENCOUNTER — HOSPITAL ENCOUNTER (OUTPATIENT)
Dept: ULTRASOUND IMAGING | Age: 40
Discharge: HOME | End: 2020-02-10
Payer: MEDICARE

## 2020-02-08 PROCEDURE — 76856 US EXAM PELVIC COMPLETE: CPT

## 2020-02-08 PROCEDURE — 76830 TRANSVAGINAL US NON-OB: CPT

## 2020-02-11 ENCOUNTER — OFFICE VISIT (OUTPATIENT)
Dept: OBGYN CLINIC | Age: 40
End: 2020-02-11
Payer: MEDICARE

## 2020-02-11 VITALS
BODY MASS INDEX: 47.77 KG/M2 | DIASTOLIC BLOOD PRESSURE: 88 MMHG | SYSTOLIC BLOOD PRESSURE: 124 MMHG | WEIGHT: 253 LBS | HEIGHT: 61 IN

## 2020-02-11 PROCEDURE — 1036F TOBACCO NON-USER: CPT | Performed by: OBSTETRICS & GYNECOLOGY

## 2020-02-11 PROCEDURE — 99395 PREV VISIT EST AGE 18-39: CPT | Performed by: OBSTETRICS & GYNECOLOGY

## 2020-02-11 PROCEDURE — G8427 DOCREV CUR MEDS BY ELIG CLIN: HCPCS | Performed by: OBSTETRICS & GYNECOLOGY

## 2020-02-11 PROCEDURE — G8417 CALC BMI ABV UP PARAM F/U: HCPCS | Performed by: OBSTETRICS & GYNECOLOGY

## 2020-02-11 PROCEDURE — G8484 FLU IMMUNIZE NO ADMIN: HCPCS | Performed by: OBSTETRICS & GYNECOLOGY

## 2020-02-11 PROCEDURE — 99213 OFFICE O/P EST LOW 20 MIN: CPT | Performed by: OBSTETRICS & GYNECOLOGY

## 2020-02-11 ASSESSMENT — ENCOUNTER SYMPTOMS
RESPIRATORY NEGATIVE: 1
BLOOD IN STOOL: 0
ALLERGIC/IMMUNOLOGIC NEGATIVE: 1
NAUSEA: 0
CONSTIPATION: 0
VOMITING: 0
RECTAL PAIN: 0
DIARRHEA: 0
ABDOMINAL PAIN: 0
EYES NEGATIVE: 1
ANAL BLEEDING: 0
ABDOMINAL DISTENTION: 0

## 2020-02-11 NOTE — PATIENT INSTRUCTIONS
Patient Education        Abdominal Hysterectomy: Before Your Surgery  What is an abdominal hysterectomy? Abdominal hysterectomy is surgery to remove the uterus. The cervix is often taken out too. This is done through a cut in the lower belly. The cut is called an incision. The ovaries and fallopian tubes also may be removed. The doctor may make a horizontal incision. This cut goes from side-to-side and is done at the pubic hairline. It's called a \"bikini cut. \" Or the incision may be vertical. This type goes up and down between the belly button and the pubic bone. Both types leave a scar that most often fades with time. After the surgery, you will no longer have periods or be able to get pregnant. Your doctor may have you take hormones if your ovaries were removed. He or she will talk to you about the risks and benefits of hormones. You can also discuss how long you should take them. This surgery probably won't lower your interest in sex. In fact, some women enjoy sex more. This may be because they no longer have to worry about birth control or heavy bleeding. Most women go home in 1 to 2 days. You will likely feel better each day. But you may need about 4 to 6 weeks to fully recover. Follow-up care is a key part of your treatment and safety. Be sure to make and go to all appointments, and call your doctor if you are having problems. It's also a good idea to know your test results and keep a list of the medicines you take. What happens before surgery?   Surgery can be stressful. This information will help you understand what you can expect. And it will help you safely prepare for surgery.   Preparing for surgery    · Understand exactly what surgery is planned, along with the risks, benefits, and other options. · Tell your doctors ALL the medicines, vitamins, supplements, and herbal remedies you take.  Some of these can increase the risk of bleeding or interact with anesthesia.     · If you take aspirin and helps prevent pneumonia and constipation.     · Avoid lifting anything that would make you strain. This may include a child, heavy grocery bags and milk containers, a heavy briefcase or backpack, cat litter or dog food bags, or a vacuum .     · Avoid strenuous activities, such as biking, jogging, weight lifting, or aerobic exercise, until your doctor says it is okay.     · You may shower. Pat the cut (incision) dry. Do not take a bath for the first 2 weeks, or until your doctor tells you it is okay.     · Ask your doctor when you can drive again.     · You will probably need to take 2 to 4 weeks off from work. It depends on the type of work you do and how you feel.     · Your doctor will tell you when you can have sex again. Diet    · You can eat your normal diet. If your stomach is upset, try bland, low-fat foods like plain rice, broiled chicken, toast, and yogurt.     · Drink plenty of fluids (unless your doctor tells you not to).     · You may notice that your bowel movements are not regular right after your surgery. This is common. Try to avoid constipation and straining with bowel movements. You may want to take a fiber supplement every day. If you have not had a bowel movement after a couple of days, ask your doctor about taking a mild laxative. Medicines    · Your doctor will tell you if and when you can restart your medicines. He or she will also give you instructions about taking any new medicines.     · If you take aspirin or some other blood thinner, ask your doctor if and when to start taking it again. Make sure that you understand exactly what your doctor wants you to do.     · Be safe with medicines. Take pain medicines exactly as directed. ? If the doctor gave you a prescription medicine for pain, take it as prescribed.   ? If you are not taking a prescription pain medicine, ask your doctor if you can take an over-the-counter medicine.     · If your doctor prescribed antibiotics, take from the incision. ? Pus draining from the incision. ? A fever.     · You have bright red vaginal bleeding that soaks one or more pads in an hour, or you have large clots.     · You have signs of a blood clot in your leg (called a deep vein thrombosis), such as:  ? Pain in your calf, back of the knee, thigh, or groin. ? Redness and swelling in your leg.    Watch closely for changes in your health, and be sure to contact your doctor if you have any problems. Where can you learn more? Go to https://[email or web address].[email or web address] org and sign in to your [employer] account. Enter M280 in the [employer] box to learn more about \"Abdominal Hysterectomy: What to Expect at Home. \"     If you do not have an account, please click on the \"Sign Up Now\" link. Current as of: February 19, 2019  Content Version: 12.3  © 2140-4305 [employer]. Care instructions adapted under license by [facility] ([facility]). If you have questions about a medical condition or this instruction, always ask your healthcare professional. [place] [address] any warranty or liability for your use of this information. Patient Education        Uterine Fibroids: Care Instructions  Your Care Instructions    Uterine fibroids are growths in the uterus. Fibroids aren't cancer. Doctors don't know what causes fibroids. Fibroids are very common in women during their childbearing years. Fibroids can grow on the inside of the uterus, in the muscle wall of the uterus, or near the outside wall of the uterus. In some women, fibroids cause painful cramps and heavy periods. In these cases, taking anti-inflammatory medicines, birth control pills, or using an intrauterine device (IUD) often helps decrease symptoms. Sometimes surgery is needed to treat fibroids. But if you are near menopause, you may want to wait and see if your symptoms get better.   Most fibroids shrink and go away after menopause, when your menstrual periods stop you do not have an account, please click on the \"Sign Up Now\" link. Current as of: February 19, 2019  Content Version: 12.3  © 0434-1441 [employer]. Care instructions adapted under license by [facility] ([facility]). If you have questions about a medical condition or this instruction, always ask your healthcare professional. [address] any warranty or liability for your use of this information.

## 2020-02-11 NOTE — PROGRESS NOTES
Not on file    Food insecurity:     Worry: Not on file     Inability: Not on file    Transportation needs:     Medical: Not on file     Non-medical: Not on file   Tobacco Use    Smoking status: Former Smoker    Smokeless tobacco: Former User   Substance and Sexual Activity    Alcohol use: Yes     Comment: On \"occasion liquor\"    Drug use: No    Sexual activity: Yes     Partners: Male     Comment: Condoms   Lifestyle    Physical activity:     Days per week: Not on file     Minutes per session: Not on file    Stress: Not on file   Relationships    Social connections:     Talks on phone: Not on file     Gets together: Not on file     Attends Muslim service: Not on file     Active member of club or organization: Not on file     Attends meetings of clubs or organizations: Not on file     Relationship status: Not on file    Intimate partner violence:     Fear of current or ex partner: Not on file     Emotionally abused: Not on file     Physically abused: Not on file     Forced sexual activity: Not on file   Other Topics Concern    Not on file   Social History Narrative    Not on file     Family History   Problem Relation Age of Onset    Diabetes Paternal Grandmother     Diabetes Father     Diabetes Paternal Aunt     Diabetes Paternal Uncle     Breast Cancer Neg Hx     Cancer Neg Hx     Colon Cancer Neg Hx     Eclampsia Neg Hx     Ovarian Cancer Neg Hx     Hypertension Neg Hx      Labor Neg Hx     Spont Abortions Neg Hx     Stroke Neg Hx        Review of Systems   Constitutional: Negative. Negative for activity change, appetite change, chills, diaphoresis, fatigue, fever and unexpected weight change. HENT: Negative. Eyes: Negative. Respiratory: Negative. Cardiovascular: Negative. Gastrointestinal: Negative for abdominal distention, abdominal pain, anal bleeding, blood in stool, constipation, diarrhea, nausea, rectal pain and vomiting. Endocrine: Negative. Genitourinary: Positive for menstrual problem (Irregular and heavy cycles). Negative for decreased urine volume, difficulty urinating, dyspareunia, dysuria, enuresis, flank pain, frequency, genital sores, hematuria, pelvic pain, urgency, vaginal bleeding, vaginal discharge and vaginal pain. Musculoskeletal: Negative. Skin: Negative. Allergic/Immunologic: Negative. Neurological: Negative. Hematological: Negative. Psychiatric/Behavioral: Negative. Objective:     Physical Exam  Constitutional:       Appearance: She is well-developed. HENT:      Head: Normocephalic. Neck:      Musculoskeletal: Normal range of motion and neck supple. Thyroid: No thyromegaly. Cardiovascular:      Rate and Rhythm: Normal rate and regular rhythm. Heart sounds: Normal heart sounds. Pulmonary:      Effort: Pulmonary effort is normal. No respiratory distress. Breath sounds: Normal breath sounds. No wheezing or rales. Chest:      Chest wall: No tenderness. Breasts:         Right: No mass, nipple discharge, skin change or tenderness. Left: No mass, nipple discharge, skin change or tenderness. Abdominal:      General: Bowel sounds are normal. There is no distension. Palpations: Abdomen is soft. There is no mass. Tenderness: There is no abdominal tenderness. There is no guarding or rebound. Hernia: There is no hernia in the right inguinal area or left inguinal area. Genitourinary:     Labia:         Right: No rash, tenderness, lesion or injury. Left: No rash, tenderness, lesion or injury. Vagina: Normal. No signs of injury and foreign body. No vaginal discharge, erythema, tenderness or bleeding. Cervix: No cervical motion tenderness, discharge or friability. Uterus: Enlarged (20+ cm and bulky). Not deviated, not fixed and not tender. Adnexa:         Right: No mass, tenderness or fullness. Left: No mass, tenderness or fullness.

## 2020-02-24 ENCOUNTER — OFFICE VISIT (OUTPATIENT)
Dept: OBGYN CLINIC | Age: 40
End: 2020-02-24
Payer: MEDICARE

## 2020-02-24 VITALS
BODY MASS INDEX: 48.52 KG/M2 | DIASTOLIC BLOOD PRESSURE: 82 MMHG | SYSTOLIC BLOOD PRESSURE: 128 MMHG | HEIGHT: 61 IN | WEIGHT: 257 LBS

## 2020-02-24 PROCEDURE — G8417 CALC BMI ABV UP PARAM F/U: HCPCS | Performed by: OBSTETRICS & GYNECOLOGY

## 2020-02-24 PROCEDURE — G8427 DOCREV CUR MEDS BY ELIG CLIN: HCPCS | Performed by: OBSTETRICS & GYNECOLOGY

## 2020-02-24 PROCEDURE — G8484 FLU IMMUNIZE NO ADMIN: HCPCS | Performed by: OBSTETRICS & GYNECOLOGY

## 2020-02-24 PROCEDURE — 99203 OFFICE O/P NEW LOW 30 MIN: CPT | Performed by: OBSTETRICS & GYNECOLOGY

## 2020-02-24 PROCEDURE — 1036F TOBACCO NON-USER: CPT | Performed by: OBSTETRICS & GYNECOLOGY

## 2020-02-25 ENCOUNTER — INITIAL CONSULT (OUTPATIENT)
Dept: INTERVENTIONAL RADIOLOGY/VASCULAR | Age: 40
End: 2020-02-25
Payer: MEDICARE

## 2020-02-25 VITALS
HEART RATE: 85 BPM | OXYGEN SATURATION: 97 % | WEIGHT: 257 LBS | SYSTOLIC BLOOD PRESSURE: 138 MMHG | BODY MASS INDEX: 48.56 KG/M2 | RESPIRATION RATE: 16 BRPM | DIASTOLIC BLOOD PRESSURE: 95 MMHG

## 2020-02-25 PROCEDURE — G8427 DOCREV CUR MEDS BY ELIG CLIN: HCPCS | Performed by: NURSE PRACTITIONER

## 2020-02-25 PROCEDURE — G8417 CALC BMI ABV UP PARAM F/U: HCPCS | Performed by: NURSE PRACTITIONER

## 2020-02-25 PROCEDURE — 99245 OFF/OP CONSLTJ NEW/EST HI 55: CPT | Performed by: NURSE PRACTITIONER

## 2020-02-25 PROCEDURE — G8484 FLU IMMUNIZE NO ADMIN: HCPCS | Performed by: NURSE PRACTITIONER

## 2020-02-25 ASSESSMENT — ENCOUNTER SYMPTOMS
GASTROINTESTINAL NEGATIVE: 1
RESPIRATORY NEGATIVE: 1
EYES NEGATIVE: 1

## 2020-02-25 NOTE — PROGRESS NOTES
Rikki Peraza, a female of 46 y.o. came to the office 2020. Chief Complaint   Patient presents with    Consultation     embolization     referred from dr elias        HPI: Rikki Peraza presents to clinic for consultation at the request of her GYN Dr. Brayden Monroy for Uterine Fibroid  Embolization for Fibroid Uterine Tumors causing irregular painful heavy menses x 2 years with progression. Has not given birth and at this time is unsure if she wishes fertility in future. Family History   Problem Relation Age of Onset    Diabetes Paternal Grandmother     Diabetes Father     Diabetes Paternal Aunt     Diabetes Paternal Uncle     Breast Cancer Neg Hx     Cancer Neg Hx     Colon Cancer Neg Hx     Eclampsia Neg Hx     Ovarian Cancer Neg Hx     Hypertension Neg Hx      Labor Neg Hx     Spont Abortions Neg Hx     Stroke Neg Hx        Past Surgical History:   Procedure Laterality Date    FOOT SURGERY      cyst on right foot as a child        Past Medical History:   Diagnosis Date    Abnormal Pap smear of cervix     Breast disorder     Fibroid        Social History     Socioeconomic History    Marital status: Single     Spouse name: Not on file    Number of children: Not on file    Years of education: Not on file    Highest education level: Not on file   Occupational History    Not on file   Social Needs    Financial resource strain: Not on file    Food insecurity:     Worry: Not on file     Inability: Not on file    Transportation needs:     Medical: Not on file     Non-medical: Not on file   Tobacco Use    Smoking status: Former Smoker    Smokeless tobacco: Former User   Substance and Sexual Activity    Alcohol use: Yes     Comment:  On \"occasion liquor\"    Drug use: No    Sexual activity: Yes     Partners: Male     Comment: Condoms   Lifestyle    Physical activity:     Days per week: Not on file     Minutes per session: Not on file    Stress: Not on file

## 2020-02-26 ENCOUNTER — TELEPHONE (OUTPATIENT)
Dept: INTERVENTIONAL RADIOLOGY/VASCULAR | Age: 40
End: 2020-02-26

## 2020-02-26 NOTE — TELEPHONE ENCOUNTER
Spoke with pt regarding upcoming embolization on 3/4/2020     Told to arrive at 9am for 10am procedure. NPO after MN,  may take meds with sips of water the morning of. Bringing a . Pt had no further questions.

## 2020-02-27 ENCOUNTER — PATIENT MESSAGE (OUTPATIENT)
Dept: OBGYN CLINIC | Age: 40
End: 2020-02-27

## 2020-02-27 RX ORDER — METRONIDAZOLE 7.5 MG/G
1 GEL VAGINAL DAILY
Qty: 1 TUBE | Refills: 0 | Status: SHIPPED | OUTPATIENT
Start: 2020-02-27 | End: 2020-03-03

## 2020-02-27 NOTE — TELEPHONE ENCOUNTER
From: Rupinder Ballard  To:  Michael Tao MD  Sent: 2/27/2020 1:43 PM EST  Subject: Test Results Question    Okay what is tht ?and my pharmacy is [address]

## 2020-02-28 NOTE — TELEPHONE ENCOUNTER
Metronidazole was sent to pharmacy (patient is allergic) Per [place] Laura, Tindamax or Solosec should be prescribed.

## 2020-03-03 ENCOUNTER — TELEPHONE (OUTPATIENT)
Dept: INTERVENTIONAL RADIOLOGY/VASCULAR | Age: 40
End: 2020-03-03

## 2020-03-03 ASSESSMENT — ENCOUNTER SYMPTOMS
VOMITING: 0
COUGH: 0
NAUSEA: 0
SHORTNESS OF BREATH: 0

## 2020-03-05 ENCOUNTER — TELEPHONE (OUTPATIENT)
Dept: OBGYN CLINIC | Age: 40
End: 2020-03-05

## 2020-03-05 NOTE — TELEPHONE ENCOUNTER
Patient called back and states that she used \"flagyl gel\", and that she got a rash around her chest with itching. Per Dr Akilah Cueva, clindesse vaginal cream to be sent to pharmacy. Pt understood.

## 2020-03-10 ENCOUNTER — PREP FOR PROCEDURE (OUTPATIENT)
Dept: INTERVENTIONAL RADIOLOGY/VASCULAR | Age: 40
End: 2020-03-10

## 2020-03-10 RX ORDER — 0.9 % SODIUM CHLORIDE 0.9 %
250 INTRAVENOUS SOLUTION INTRAVENOUS
Status: CANCELLED | OUTPATIENT
Start: 2020-03-10

## 2020-03-10 RX ORDER — 0.9 % SODIUM CHLORIDE 0.9 %
1000 INTRAVENOUS SOLUTION INTRAVENOUS
Status: CANCELLED | OUTPATIENT
Start: 2020-03-10

## 2020-03-10 RX ORDER — FENTANYL CITRATE 50 UG/ML
50 INJECTION, SOLUTION INTRAMUSCULAR; INTRAVENOUS
Status: CANCELLED | OUTPATIENT
Start: 2020-03-10

## 2020-03-10 RX ORDER — LIDOCAINE HYDROCHLORIDE 20 MG/ML
10 INJECTION, SOLUTION INFILTRATION; PERINEURAL
Status: CANCELLED | OUTPATIENT
Start: 2020-03-10

## 2020-03-10 RX ORDER — ONDANSETRON 2 MG/ML
4 INJECTION INTRAMUSCULAR; INTRAVENOUS
Status: CANCELLED | OUTPATIENT
Start: 2020-03-10 | End: 2020-03-10

## 2020-03-10 RX ORDER — IBUPROFEN 200 MG
TABLET ORAL ONCE
Status: CANCELLED | OUTPATIENT
Start: 2020-03-10 | End: 2020-03-10

## 2020-03-10 RX ORDER — MIDAZOLAM HYDROCHLORIDE 1 MG/ML
0.5 INJECTION INTRAMUSCULAR; INTRAVENOUS
Status: CANCELLED | OUTPATIENT
Start: 2020-03-10

## 2020-03-11 ENCOUNTER — HOSPITAL ENCOUNTER (OUTPATIENT)
Dept: INTERVENTIONAL RADIOLOGY/VASCULAR | Age: 40
Discharge: HOME | End: 2020-03-13
Payer: MEDICARE

## 2020-03-11 ENCOUNTER — HOSPITAL ENCOUNTER (OUTPATIENT)
Age: 40
Setting detail: OBSERVATION
Discharge: HOME | End: 2020-03-12
Attending: INTERNAL MEDICINE | Admitting: INTERNAL MEDICINE
Payer: MEDICARE

## 2020-03-11 ENCOUNTER — HOSPITAL ENCOUNTER (OUTPATIENT)
Dept: CARDIAC CATH/INVASIVE PROCEDURES | Age: 40
Discharge: HOME | End: 2020-03-11
Payer: MEDICARE

## 2020-03-11 ENCOUNTER — PREP FOR PROCEDURE (OUTPATIENT)
Dept: INTERVENTIONAL RADIOLOGY/VASCULAR | Age: 40
End: 2020-03-11

## 2020-03-11 VITALS
SYSTOLIC BLOOD PRESSURE: 151 MMHG | DIASTOLIC BLOOD PRESSURE: 78 MMHG | HEIGHT: 61 IN | WEIGHT: 260 LBS | OXYGEN SATURATION: 96 % | HEART RATE: 62 BPM | RESPIRATION RATE: 21 BRPM | TEMPERATURE: 97.6 F | BODY MASS INDEX: 49.09 KG/M2

## 2020-03-11 PROBLEM — R10.9 ABDOMINAL PAIN: Status: ACTIVE | Noted: 2020-03-11

## 2020-03-11 PROCEDURE — 2500000003 HC RX 250 WO HCPCS

## 2020-03-11 PROCEDURE — 2500000003 HC RX 250 WO HCPCS: Performed by: RADIOLOGY

## 2020-03-11 PROCEDURE — 96374 THER/PROPH/DIAG INJ IV PUSH: CPT

## 2020-03-11 PROCEDURE — 2580000003 HC RX 258: Performed by: NURSE PRACTITIONER

## 2020-03-11 PROCEDURE — 36247 INS CATH ABD/L-EXT ART 3RD: CPT | Performed by: RADIOLOGY

## 2020-03-11 PROCEDURE — 96376 TX/PRO/DX INJ SAME DRUG ADON: CPT

## 2020-03-11 PROCEDURE — 2580000003 HC RX 258

## 2020-03-11 PROCEDURE — 6360000004 HC RX CONTRAST MEDICATION: Performed by: NURSE PRACTITIONER

## 2020-03-11 PROCEDURE — 75736 ARTERY X-RAYS PELVIS: CPT | Performed by: RADIOLOGY

## 2020-03-11 PROCEDURE — 6360000002 HC RX W HCPCS: Performed by: NURSE PRACTITIONER

## 2020-03-11 PROCEDURE — G0378 HOSPITAL OBSERVATION PER HR: HCPCS

## 2020-03-11 PROCEDURE — 99152 MOD SED SAME PHYS/QHP 5/>YRS: CPT | Performed by: RADIOLOGY

## 2020-03-11 PROCEDURE — G0379 DIRECT REFER HOSPITAL OBSERV: HCPCS

## 2020-03-11 PROCEDURE — 6360000002 HC RX W HCPCS

## 2020-03-11 PROCEDURE — 6370000000 HC RX 637 (ALT 250 FOR IP): Performed by: NURSE PRACTITIONER

## 2020-03-11 PROCEDURE — 37243 VASC EMBOLIZE/OCCLUDE ORGAN: CPT | Performed by: RADIOLOGY

## 2020-03-11 PROCEDURE — 96372 THER/PROPH/DIAG INJ SC/IM: CPT

## 2020-03-11 PROCEDURE — 96375 TX/PRO/DX INJ NEW DRUG ADDON: CPT

## 2020-03-11 PROCEDURE — C1887 CATHETER, GUIDING: HCPCS

## 2020-03-11 PROCEDURE — 2500000003 HC RX 250 WO HCPCS: Performed by: NURSE PRACTITIONER

## 2020-03-11 PROCEDURE — 6360000002 HC RX W HCPCS: Performed by: RADIOLOGY

## 2020-03-11 RX ORDER — ACETAMINOPHEN 325 MG/1
650 TABLET ORAL EVERY 6 HOURS PRN
Status: DISCONTINUED | OUTPATIENT
Start: 2020-03-11 | End: 2020-03-12 | Stop reason: HOSPADM

## 2020-03-11 RX ORDER — FENTANYL CITRATE 50 UG/ML
INJECTION, SOLUTION INTRAMUSCULAR; INTRAVENOUS
Status: COMPLETED | OUTPATIENT
Start: 2020-03-11 | End: 2020-03-11

## 2020-03-11 RX ORDER — MIDAZOLAM HYDROCHLORIDE 1 MG/ML
INJECTION INTRAMUSCULAR; INTRAVENOUS
Status: COMPLETED | OUTPATIENT
Start: 2020-03-11 | End: 2020-03-11

## 2020-03-11 RX ORDER — ONDANSETRON 2 MG/ML
INJECTION INTRAMUSCULAR; INTRAVENOUS
Status: COMPLETED | OUTPATIENT
Start: 2020-03-11 | End: 2020-03-11

## 2020-03-11 RX ORDER — LIDOCAINE HYDROCHLORIDE 20 MG/ML
INJECTION, SOLUTION INFILTRATION; PERINEURAL
Status: COMPLETED | OUTPATIENT
Start: 2020-03-11 | End: 2020-03-11

## 2020-03-11 RX ORDER — KETOROLAC TROMETHAMINE 30 MG/ML
30 INJECTION, SOLUTION INTRAMUSCULAR; INTRAVENOUS EVERY 6 HOURS
Status: DISCONTINUED | OUTPATIENT
Start: 2020-03-11 | End: 2020-03-12 | Stop reason: HOSPADM

## 2020-03-11 RX ORDER — SODIUM CHLORIDE 9 MG/ML
INJECTION, SOLUTION INTRAVENOUS CONTINUOUS
Status: DISPENSED | OUTPATIENT
Start: 2020-03-11 | End: 2020-03-12

## 2020-03-11 RX ORDER — PANTOPRAZOLE SODIUM 40 MG/1
40 TABLET, DELAYED RELEASE ORAL
Status: DISCONTINUED | OUTPATIENT
Start: 2020-03-12 | End: 2020-03-12 | Stop reason: HOSPADM

## 2020-03-11 RX ORDER — SODIUM CHLORIDE 0.9 % (FLUSH) 0.9 %
10 SYRINGE (ML) INJECTION PRN
Status: DISCONTINUED | OUTPATIENT
Start: 2020-03-11 | End: 2020-03-12 | Stop reason: HOSPADM

## 2020-03-11 RX ORDER — MORPHINE SULFATE 2 MG/ML
2 INJECTION, SOLUTION INTRAMUSCULAR; INTRAVENOUS
Status: COMPLETED | OUTPATIENT
Start: 2020-03-11 | End: 2020-03-11

## 2020-03-11 RX ORDER — FENTANYL CITRATE 50 UG/ML
50 INJECTION, SOLUTION INTRAMUSCULAR; INTRAVENOUS
Status: DISCONTINUED | OUTPATIENT
Start: 2020-03-11 | End: 2020-03-14 | Stop reason: HOSPADM

## 2020-03-11 RX ORDER — POLYETHYLENE GLYCOL 3350 17 G/17G
17 POWDER, FOR SOLUTION ORAL DAILY PRN
Status: DISCONTINUED | OUTPATIENT
Start: 2020-03-11 | End: 2020-03-12 | Stop reason: HOSPADM

## 2020-03-11 RX ORDER — IBUPROFEN 200 MG
800 TABLET ORAL
Status: COMPLETED | OUTPATIENT
Start: 2020-03-11 | End: 2020-03-11

## 2020-03-11 RX ORDER — ACETAMINOPHEN 650 MG/1
650 SUPPOSITORY RECTAL EVERY 6 HOURS PRN
Status: DISCONTINUED | OUTPATIENT
Start: 2020-03-11 | End: 2020-03-12 | Stop reason: HOSPADM

## 2020-03-11 RX ORDER — ACETAMINOPHEN 500 MG
1000 TABLET ORAL EVERY 6 HOURS PRN
Status: DISCONTINUED | OUTPATIENT
Start: 2020-03-11 | End: 2020-03-12 | Stop reason: HOSPADM

## 2020-03-11 RX ORDER — MORPHINE SULFATE 2 MG/ML
2 INJECTION, SOLUTION INTRAMUSCULAR; INTRAVENOUS
Status: ACTIVE | OUTPATIENT
Start: 2020-03-11 | End: 2020-03-11

## 2020-03-11 RX ORDER — SODIUM CHLORIDE 0.9 % (FLUSH) 0.9 %
10 SYRINGE (ML) INJECTION EVERY 12 HOURS SCHEDULED
Status: DISCONTINUED | OUTPATIENT
Start: 2020-03-11 | End: 2020-03-12 | Stop reason: HOSPADM

## 2020-03-11 RX ORDER — AMLODIPINE BESYLATE 5 MG/1
5 TABLET ORAL DAILY
Status: ON HOLD | COMMUNITY
End: 2021-09-16

## 2020-03-11 RX ORDER — TRAMADOL HYDROCHLORIDE 50 MG/1
100 TABLET ORAL EVERY 6 HOURS PRN
Status: DISCONTINUED | OUTPATIENT
Start: 2020-03-11 | End: 2020-03-12 | Stop reason: HOSPADM

## 2020-03-11 RX ORDER — ONDANSETRON 2 MG/ML
4 INJECTION INTRAMUSCULAR; INTRAVENOUS
Status: ACTIVE | OUTPATIENT
Start: 2020-03-11 | End: 2020-03-11

## 2020-03-11 RX ORDER — 0.9 % SODIUM CHLORIDE 0.9 %
250 INTRAVENOUS SOLUTION INTRAVENOUS
Status: DISCONTINUED | OUTPATIENT
Start: 2020-03-11 | End: 2020-03-14 | Stop reason: HOSPADM

## 2020-03-11 RX ORDER — MIDAZOLAM HYDROCHLORIDE 1 MG/ML
0.5 INJECTION INTRAMUSCULAR; INTRAVENOUS
Status: DISCONTINUED | OUTPATIENT
Start: 2020-03-11 | End: 2020-03-14 | Stop reason: HOSPADM

## 2020-03-11 RX ORDER — TRAMADOL HYDROCHLORIDE 50 MG/1
50 TABLET ORAL EVERY 6 HOURS PRN
Status: DISCONTINUED | OUTPATIENT
Start: 2020-03-11 | End: 2020-03-12 | Stop reason: HOSPADM

## 2020-03-11 RX ORDER — AMLODIPINE BESYLATE 5 MG/1
5 TABLET ORAL DAILY
Status: DISCONTINUED | OUTPATIENT
Start: 2020-03-11 | End: 2020-03-12 | Stop reason: HOSPADM

## 2020-03-11 RX ORDER — ONDANSETRON 2 MG/ML
4 INJECTION INTRAMUSCULAR; INTRAVENOUS EVERY 6 HOURS PRN
Status: DISCONTINUED | OUTPATIENT
Start: 2020-03-11 | End: 2020-03-12 | Stop reason: HOSPADM

## 2020-03-11 RX ORDER — PROMETHAZINE HYDROCHLORIDE 12.5 MG/1
12.5 TABLET ORAL EVERY 6 HOURS PRN
Status: DISCONTINUED | OUTPATIENT
Start: 2020-03-11 | End: 2020-03-12 | Stop reason: HOSPADM

## 2020-03-11 RX ORDER — 0.9 % SODIUM CHLORIDE 0.9 %
1000 INTRAVENOUS SOLUTION INTRAVENOUS
Status: DISCONTINUED | OUTPATIENT
Start: 2020-03-11 | End: 2020-03-14 | Stop reason: HOSPADM

## 2020-03-11 RX ORDER — LIDOCAINE HYDROCHLORIDE 20 MG/ML
10 INJECTION, SOLUTION INFILTRATION; PERINEURAL
Status: DISCONTINUED | OUTPATIENT
Start: 2020-03-11 | End: 2020-03-14 | Stop reason: HOSPADM

## 2020-03-11 RX ORDER — IBUPROFEN 600 MG/1
600 TABLET ORAL EVERY 6 HOURS PRN
Status: DISCONTINUED | OUTPATIENT
Start: 2020-03-11 | End: 2020-03-12 | Stop reason: HOSPADM

## 2020-03-11 RX ORDER — METOPROLOL TARTRATE 5 MG/5ML
2.5 INJECTION INTRAVENOUS
Status: DISCONTINUED | OUTPATIENT
Start: 2020-03-11 | End: 2020-03-14 | Stop reason: HOSPADM

## 2020-03-11 RX ORDER — BACITRACIN, NEOMYCIN, POLYMYXIN B 400; 3.5; 5 [USP'U]/G; MG/G; [USP'U]/G
OINTMENT TOPICAL ONCE
Status: DISCONTINUED | OUTPATIENT
Start: 2020-03-11 | End: 2020-03-14 | Stop reason: HOSPADM

## 2020-03-11 RX ADMIN — ENOXAPARIN SODIUM 40 MG: 40 INJECTION SUBCUTANEOUS at 17:56

## 2020-03-11 RX ADMIN — SODIUM CHLORIDE: 9 INJECTION, SOLUTION INTRAVENOUS at 17:56

## 2020-03-11 RX ADMIN — FENTANYL CITRATE 50 MCG: 50 INJECTION, SOLUTION INTRAMUSCULAR; INTRAVENOUS at 11:04

## 2020-03-11 RX ADMIN — FENTANYL CITRATE 50 MCG: 50 INJECTION, SOLUTION INTRAMUSCULAR; INTRAVENOUS at 13:28

## 2020-03-11 RX ADMIN — SODIUM CHLORIDE 1000 ML: 9 INJECTION, SOLUTION INTRAVENOUS at 10:55

## 2020-03-11 RX ADMIN — LIDOCAINE HYDROCHLORIDE 10 ML: 20 INJECTION, SOLUTION INFILTRATION; PERINEURAL at 13:52

## 2020-03-11 RX ADMIN — IOPAMIDOL 225 ML: 755 INJECTION, SOLUTION INTRAVENOUS at 14:02

## 2020-03-11 RX ADMIN — KETOROLAC TROMETHAMINE 30 MG: 30 INJECTION, SOLUTION INTRAMUSCULAR; INTRAVENOUS at 17:55

## 2020-03-11 RX ADMIN — HYDROMORPHONE HYDROCHLORIDE 1 MG: 1 INJECTION, SOLUTION INTRAMUSCULAR; INTRAVENOUS; SUBCUTANEOUS at 19:12

## 2020-03-11 RX ADMIN — MIDAZOLAM HYDROCHLORIDE 0.5 MG: 2 INJECTION, SOLUTION INTRAMUSCULAR; INTRAVENOUS at 13:57

## 2020-03-11 RX ADMIN — FENTANYL CITRATE 50 MCG: 50 INJECTION, SOLUTION INTRAMUSCULAR; INTRAVENOUS at 11:59

## 2020-03-11 RX ADMIN — IBUPROFEN 800 MG: 200 TABLET, FILM COATED ORAL at 15:39

## 2020-03-11 RX ADMIN — MIDAZOLAM HYDROCHLORIDE 0.5 MG: 1 INJECTION, SOLUTION INTRAMUSCULAR; INTRAVENOUS at 11:58

## 2020-03-11 RX ADMIN — MIDAZOLAM HYDROCHLORIDE 0.5 MG: 1 INJECTION, SOLUTION INTRAMUSCULAR; INTRAVENOUS at 11:04

## 2020-03-11 RX ADMIN — SODIUM CHLORIDE 250 ML: 9 INJECTION, SOLUTION INTRAVENOUS at 10:29

## 2020-03-11 RX ADMIN — LIDOCAINE HYDROCHLORIDE 10 ML: 20 INJECTION, SOLUTION INFILTRATION; PERINEURAL at 11:05

## 2020-03-11 RX ADMIN — ONDANSETRON 4 MG: 2 INJECTION INTRAMUSCULAR; INTRAVENOUS at 13:38

## 2020-03-11 RX ADMIN — FENTANYL CITRATE 50 MCG: 50 INJECTION, SOLUTION INTRAMUSCULAR; INTRAVENOUS at 13:24

## 2020-03-11 RX ADMIN — MIDAZOLAM HYDROCHLORIDE 0.5 MG: 2 INJECTION, SOLUTION INTRAMUSCULAR; INTRAVENOUS at 13:28

## 2020-03-11 RX ADMIN — MIDAZOLAM HYDROCHLORIDE 0.5 MG: 2 INJECTION, SOLUTION INTRAMUSCULAR; INTRAVENOUS at 12:37

## 2020-03-11 RX ADMIN — CEFAZOLIN 1 G: 1 INJECTION, POWDER, FOR SOLUTION INTRAMUSCULAR; INTRAVENOUS at 10:02

## 2020-03-11 RX ADMIN — SODIUM CHLORIDE 1000 ML: 9 INJECTION, SOLUTION INTRAVENOUS at 11:38

## 2020-03-11 RX ADMIN — SODIUM CHLORIDE 250 ML: 9 INJECTION, SOLUTION INTRAVENOUS at 11:44

## 2020-03-11 RX ADMIN — FENTANYL CITRATE 50 MCG: 50 INJECTION, SOLUTION INTRAMUSCULAR; INTRAVENOUS at 13:57

## 2020-03-11 RX ADMIN — FENTANYL CITRATE 50 MCG: 50 INJECTION, SOLUTION INTRAMUSCULAR; INTRAVENOUS at 11:11

## 2020-03-11 RX ADMIN — MIDAZOLAM HYDROCHLORIDE 0.5 MG: 2 INJECTION, SOLUTION INTRAMUSCULAR; INTRAVENOUS at 12:58

## 2020-03-11 RX ADMIN — KETOROLAC TROMETHAMINE 30 MG: 30 INJECTION, SOLUTION INTRAMUSCULAR; INTRAVENOUS at 23:23

## 2020-03-11 RX ADMIN — MIDAZOLAM HYDROCHLORIDE 0.5 MG: 1 INJECTION, SOLUTION INTRAMUSCULAR; INTRAVENOUS at 11:09

## 2020-03-11 RX ADMIN — FENTANYL CITRATE 50 MCG: 50 INJECTION, SOLUTION INTRAMUSCULAR; INTRAVENOUS at 12:59

## 2020-03-11 RX ADMIN — MIDAZOLAM HYDROCHLORIDE 0.5 MG: 2 INJECTION, SOLUTION INTRAMUSCULAR; INTRAVENOUS at 13:23

## 2020-03-11 RX ADMIN — MORPHINE SULFATE 2 MG: 2 INJECTION, SOLUTION INTRAMUSCULAR; INTRAVENOUS at 14:26

## 2020-03-11 RX ADMIN — FENTANYL CITRATE 50 MCG: 50 INJECTION, SOLUTION INTRAMUSCULAR; INTRAVENOUS at 12:37

## 2020-03-11 ASSESSMENT — PAIN SCALES - GENERAL
PAINLEVEL_OUTOF10: 0
PAINLEVEL_OUTOF10: 10
PAINLEVEL_OUTOF10: 10
PAINLEVEL_OUTOF10: 4
PAINLEVEL_OUTOF10: 10
PAINLEVEL_OUTOF10: 0
PAINLEVEL_OUTOF10: 7
PAINLEVEL_OUTOF10: 0
PAINLEVEL_OUTOF10: 10

## 2020-03-11 ASSESSMENT — ENCOUNTER SYMPTOMS
EYES NEGATIVE: 1
RESPIRATORY NEGATIVE: 1
ABDOMINAL PAIN: 1
RESPIRATORY NEGATIVE: 1
EYES NEGATIVE: 1
GASTROINTESTINAL NEGATIVE: 1
ALLERGIC/IMMUNOLOGIC NEGATIVE: 1
NAUSEA: 1

## 2020-03-11 ASSESSMENT — PAIN DESCRIPTION - LOCATION: LOCATION: GROIN

## 2020-03-11 ASSESSMENT — PAIN DESCRIPTION - PAIN TYPE: TYPE: SURGICAL PAIN

## 2020-03-11 NOTE — H&P
Minutes per session: None    Stress: None   Relationships    Social connections     Talks on phone: None     Gets together: None     Attends Episcopalian service: None     Active member of club or organization: None     Attends meetings of clubs or organizations: None     Relationship status: None    Intimate partner violence     Fear of current or ex partner: None     Emotionally abused: None     Physically abused: None     Forced sexual activity: None   Other Topics Concern    None   Social History Narrative    None       Allergies   Allergen Reactions    Metronidazole Hives       Current Outpatient Medications on File Prior to Encounter   Medication Sig Dispense Refill    amLODIPine (NORVASC) 5 MG tablet Take 5 mg by mouth daily      Clindamycin Phosphate, 1 Dose, (CLINDESSE) 2 % CREA Insert vaginally once 1 Tube 0    ibuprofen (ADVIL;MOTRIN) 200 MG CAPS Take 200 mg by mouth      omeprazole (PRILOSEC) 20 MG delayed release capsule Take 20 mg by mouth      ondansetron (ZOFRAN ODT) 4 MG disintegrating tablet Take 1 tablet by mouth every 8 hours as needed for Nausea 20 tablet 0    dicyclomine (BENTYL) 10 MG capsule Take 1 capsule by mouth every 6 hours as needed (cramps) 20 capsule 0     No current facility-administered medications on file prior to encounter. Review of Systems   Constitutional: Negative for chills, fatigue and fever. HENT: Negative. Eyes: Negative. Respiratory: Negative. Cardiovascular: Negative. Gastrointestinal: Negative. Endocrine: Negative. Genitourinary: Positive for pelvic pain (with menses). Heavy bleeding with menses   Musculoskeletal: Negative. Skin: Negative. Neurological: Negative. Hematological: Negative. Psychiatric/Behavioral: Negative. OBJECTIVE:  Pulse 85   LMP 12/05/2019     Physical Exam  Constitutional:       Appearance: Normal appearance. HENT:      Head: Normocephalic and atraumatic.       Nose: Nose normal. No congestion. Mouth/Throat:      Mouth: Mucous membranes are moist.      Pharynx: Oropharynx is clear. No oropharyngeal exudate. Neck:      Musculoskeletal: Normal range of motion and neck supple. Cardiovascular:      Rate and Rhythm: Normal rate and regular rhythm. Heart sounds: Normal heart sounds. Pulmonary:      Effort: Pulmonary effort is normal.      Breath sounds: Normal breath sounds. Abdominal:      General: Bowel sounds are normal. There is no distension. Tenderness: There is no abdominal tenderness. Musculoskeletal: Normal range of motion. Right lower leg: No edema. Left lower leg: No edema. Skin:     General: Skin is warm and dry. Capillary Refill: Capillary refill takes 2 to 3 seconds. Neurological:      Mental Status: She is alert and oriented to person, place, and time. Psychiatric:         Mood and Affect: Mood normal.         Behavior: Behavior normal.       ASSESSMENT AND PLAN:    Assessment:   1. Uterine Fibroid Tumors causing irregular painful heavy menses. Symptoms have persisted for at least two years with progression. Plan:   1. Fluoroscopy guided Uterine Fibroid Embolization (UFE) with conscious sedation. Procedure with risks discussed with patient and she wishes to proceed. Complications include infection, bleeding, damage to vessels all three < 1% occurance, 1-5% may experience amenorrhea, 2-3% chance of passing fibroid tissue requiring D&C, although very rare, minimal chance of  Infertility, however it is documented that many women have been noted to have healthy pregnancies following UFE, and if symptoms continue to persist post UFE a ADRIANA may be required. Labs and meds reviewed. 2. Immediate post procedure, some patients may develop pelvic cramping and may require an overnight hospital stay for pain management. This all discussed, patient wishes to proceed with UFE. 3. Follow up 2-3 weeks post procedure.      Thank You Dr. Candelaria Rollins for

## 2020-03-11 NOTE — PRE SEDATION
Sedation Pre-Procedure Note    Patient Name: Sky Hoyos   YOB: 1980  Room/Bed: Room/bed info not found  Medical Record Number: [id number]  Date: 3/11/2020   Time: 10:25 AM       Indication:  Fluoroscopy guided Uterine Fibroid Embolization (UFE) with conscious sedation. Uterine Fibroid Tumors causing irregular painful heavy menses. Consent: I have discussed with the patient and/or the patient representative the indication, alternatives, and the possible risks and/or complications of the planned procedure and the anesthesia methods. The patient and/or patient representative appear to understand and agree to proceed. Vital Signs:   Vitals:    03/11/20 0955   Pulse: 85       Past Medical History:   has a past medical history of Abnormal Pap smear of cervix, Breast disorder, and Fibroid. Past Surgical History:   has a past surgical history that includes Foot surgery. Medications:   Scheduled Meds:    ceFAZolin  1 g Intravenous Once    neomycin-bacitracin-polymyxin   Topical Once     Continuous Infusions:   PRN Meds: sodium chloride, sodium chloride, fentanNYL, iopamidol, lidocaine, midazolam, ondansetron  Home Meds:   Prior to Admission medications    Medication Sig Start Date End Date Taking?  Authorizing Provider   amLODIPine (NORVASC) 5 MG tablet Take 5 mg by mouth daily   Yes Historical Provider, MD   Clindamycin Phosphate, 1 Dose, (CLINDESSE) 2 % CREA Insert vaginally once 3/5/20  Yes Kimberly Vaughn MD   ibuprofen (ADVIL;MOTRIN) 200 MG CAPS Take 200 mg by mouth   Yes Historical Provider, MD   omeprazole (PRILOSEC) 20 MG delayed release capsule Take 20 mg by mouth 9/17/19 4/6/20 Yes Historical Provider, MD   ondansetron (ZOFRAN ODT) 4 MG disintegrating tablet Take 1 tablet by mouth every 8 hours as needed for Nausea 9/4/18  Yes Nathalie Lennox, PA-C   dicyclomine (BENTYL) 10 MG capsule Take 1 capsule by mouth every 6 hours as needed (cramps) 9/4/18  Yes Nathalie Lennox, PA-C Coumadin Use Last 7 Days:  no  Antiplatelet drug therapy use last 7 days: no  Other anticoagulant use last 7 days: no  Additional Medication Information:  None      Pre-Sedation Documentation and Exam:   I have personally completed a history, physical exam & review of systems for this patient (see notes).     Mallampati Airway Assessment:  Mallampati Class II - (soft palate, fauces & uvula are visible)    Prior History of Anesthesia Complications:   none    ASA Classification:  Class 2 - A normal healthy patient with mild systemic disease    Sedation/ Anesthesia Plan:   intravenous sedation    Medications Planned:   midazolam (Versed) intravenously and fentanyl intravenously    Patient is an appropriate candidate for plan of sedation: yes    Electronically signed by MARIANGEL Vargas Cera, CNP on 3/11/2020 at 10:25 AM

## 2020-03-11 NOTE — PROGRESS NOTES
Pt ambulated, gait steady, back to CT holding. Pt A&Ox4, respirations even and unlabored, skin WNL. Pt changed into hospital gown and connected to radiology monitor. VSS. Pt verbalized NPO after MN last night except for sips of water with meds this am. Pt IV started, by RUFINO-RN in left FA on 1st attempt, with GBR and flush well. Ancef antibiotic infusion started, see eMar. Right groin clipped of hair. Aranda 16F inserted by LINK-RN, using sterile technique, with return of clear yellow urine. Pt noted to have large amount of thick, white vaginal discharge during aranda insertion. Pt denies any additional symptoms or problems. Dr. Sheeba Bach and Marianne Hewitt NP at bedside and spoke to pt regarding procedure including risks and answered pt questions and assessed pt. Pt verbalized understanding and denies questions. Consent obtained. Pt awaiting to go to cath lab for procedure. Pt family informed when pt went into procedure. .Electronically signed by Darin Schaeffer RN on 3/11/2020 at 10:17 AM

## 2020-03-11 NOTE — PROGRESS NOTES
Pt returned to CT holding via stretcher. Pt A&Ox4, respirations even and unlabored, skin WNL color, warm and dry. Right groin site soft with dressing c/d/i. RLE warm, +sensation, pedal pulses good. Sand bag applied to right groin. Pt medicated with Morphine 2mg IV by LINK-RN for 10/10 lower abdomen \"sharp cramping\" and lower back aching pain. Aranda to gravity drainage of clear yellow urine. Pt asks if aranda can be removed, informed pt aranda will be removed once off bedrest. Pt in agreement. Awaiting room assignment for pt to be admitted overnight. .Electronically signed by Darin Schaeffer RN on 3/11/2020 at 2:32 PM  At [phone]: pt father, grandmother and aunt at bedside, pt talking with family. Pt continues to state pain is 10/10 after 25 min receiving 2mg morphine. Right groin remains soft, no bleediing and pedal pulses good. Marianne Hewitt NP notified pt remains 10/10 pain. .Electronically signed by Darin Schaeffer RN on 3/11/2020 at 2:53 PM  Pt states 10/10 pain now just to LLQ abdomen cramping. Right groin remains soft, no bleeding, no hematoma, and pedal pulses good. VSS, except HR in 50's. Pt medicated with PO motrin 800mg as HR in the 50's. Pt tolerating PO fluids and snack well. Family continues at bedside. Aranda draining clear yellow. Pt assigned to room 474. Electronically signed by Darin Schaeffer RN on 3/11/2020 at 3:52 PM  Report called to [facility] on 4WT. Right groin remains soft, no bleeding, no hematoma and pedal pulses good. Pt in for transport to room 474. Artemio Noriega Electronically signed by Darin Schaeffer RN on 3/11/2020 at 4:02 PM  Pt to room 474 and transferred with staff x4 onto hospital bed, keeping right leg straight. Bedside assessment of right groin completed with Will Gross RN and [facility]. Pt pericare done as pt c/o feeling like she \"peed herself\". Aranda draining clear yellow. Discharge instructions for UFE procedure printed and given to [facility] . .Electronically signed by Darin Schaeffer RN on 3/11/2020 at 4:30 PM

## 2020-03-11 NOTE — SEDATION DOCUMENTATION
uncovered in preparation for administration of embospheres. Merit Maestro Microcatheter and True Form Reshapable Guide wire added to table. 1 Dr. Jannette To instructed pt to hold breath again and contrast injected via injector and fluoro imaging obtained. VSS. Pt denies pain but is very alert, Dr Jannette To made aware, more sedation will be given at time of embolization per Dr Jannette To. 1153 Repeated breath hold with contrast injection and fluoro imaging continued until reached desired site for embolization. Pt tolerated well. Verbal and tactile support continued throughout procedure. 1158 Third dose of Versed 0.5mg and Fentanyl 50mcg IV given for sedation and pain/pressure control. 1203 Dr Jannette To hand injected contrast, pt followed breathing instructions and fluoro images taken. 26 Again, Dr. Jannette To instructed pt to hold breath and contrast injected via injector and fluoro imaging obtained. Pt tolerated well. Dr. Jannette To continues to attempt to reach site for embolization. 1234 Contrast injection showed good positioning, preparing to embolize Left uterine artery. 1237 Fourth dose of Versed 0.5mg and Fentanyl 50mcg IV given for pain 10/10 in pelvis per patient. VSS. Verbal and tatcile reassurance provided. 1240 [employer] Embosphere Microspheres 2 ml 500-700 um (Lot [id number]  Exp 5/31/2020) deployed by Dr Jannette To. 1248 Pt VSS, arousable with verbal stimuli but snoring slightly. Pain appears controlled at this time. Dr Jannette To still slowly injecting embospheres. 1259 Fifth dose of Versed 0.5mg and Fentanyl 50 mcg IV given as additional embospheres are prepared to be given. 46 Dr. Jannette To again instructed pt to hold breath and contrast injected via injector and fluoro imaging obtained. Pt tolerated well this time. 5 SOS catheter replaced, using guidewire and fluoro imaging then Merit microcatheter placed to gain access to the right uterine artery.      1311 Contrast injected,

## 2020-03-11 NOTE — PRE SEDATION
Sedation Pre-Procedure Note    Patient Name: Mark Anthony Solitario   YOB: 1980  Room/Bed: Room/bed info not found  Medical Record Number: [id number]  Date: 3/11/2020   Time: 10:22 AM       Indication:  Uterine artery embolization    Consent: I have discussed with the patient and/or the patient representative the indication, alternatives, and the possible risks and/or complications of the planned procedure and the anesthesia methods. The patient and/or patient representative appear to understand and agree to proceed. Vital Signs:   Vitals:    03/11/20 0955   Pulse: 85       Past Medical History:   has a past medical history of Abnormal Pap smear of cervix, Breast disorder, and Fibroid. Past Surgical History:   has a past surgical history that includes Foot surgery. Medications:   Scheduled Meds:    ceFAZolin  1 g Intravenous Once    neomycin-bacitracin-polymyxin   Topical Once     Continuous Infusions:   PRN Meds: sodium chloride, sodium chloride, fentanNYL, iopamidol, lidocaine, midazolam, ondansetron  Home Meds:   Prior to Admission medications    Medication Sig Start Date End Date Taking?  Authorizing Provider   amLODIPine (NORVASC) 5 MG tablet Take 5 mg by mouth daily   Yes Historical Provider, MD   Clindamycin Phosphate, 1 Dose, (CLINDESSE) 2 % CREA Insert vaginally once 3/5/20  Yes Sky Fontenot MD   ibuprofen (ADVIL;MOTRIN) 200 MG CAPS Take 200 mg by mouth   Yes Historical Provider, MD   omeprazole (PRILOSEC) 20 MG delayed release capsule Take 20 mg by mouth 9/17/19 4/6/20 Yes Historical Provider, MD   ondansetron (ZOFRAN ODT) 4 MG disintegrating tablet Take 1 tablet by mouth every 8 hours as needed for Nausea 9/4/18  Yes Ruthy Alexandre PA-C   dicyclomine (BENTYL) 10 MG capsule Take 1 capsule by mouth every 6 hours as needed (cramps) 9/4/18  Yes Ruthy Alexandre PA-C     Coumadin Use Last 7 Days:  no  Antiplatelet drug therapy use last 7 days: no  Other anticoagulant use last 7 days:

## 2020-03-12 VITALS
OXYGEN SATURATION: 97 % | DIASTOLIC BLOOD PRESSURE: 88 MMHG | TEMPERATURE: 97.7 F | RESPIRATION RATE: 18 BRPM | SYSTOLIC BLOOD PRESSURE: 147 MMHG | HEART RATE: 70 BPM

## 2020-03-12 LAB
ANION GAP SERPL CALCULATED.3IONS-SCNC: 16 MEQ/L (ref 9–15)
BASOPHILS ABSOLUTE: 0 K/UL (ref 0–0.2)
BASOPHILS RELATIVE PERCENT: 0.3 %
BUN BLDV-MCNC: 8 MG/DL (ref 6–20)
CALCIUM SERPL-MCNC: 8.7 MG/DL (ref 8.5–9.9)
CHLORIDE BLD-SCNC: 104 MEQ/L (ref 95–107)
CO2: 20 MEQ/L (ref 20–31)
CREAT SERPL-MCNC: 0.63 MG/DL (ref 0.5–0.9)
EOSINOPHILS ABSOLUTE: 0 K/UL (ref 0–0.7)
EOSINOPHILS RELATIVE PERCENT: 0.3 %
GFR AFRICAN AMERICAN: >60
GFR NON-AFRICAN AMERICAN: >60
GLUCOSE BLD-MCNC: 100 MG/DL (ref 70–99)
HCT VFR BLD CALC: 40.4 % (ref 37–47)
HEMOGLOBIN: 13.2 G/DL (ref 12–16)
LYMPHOCYTES ABSOLUTE: 1.4 K/UL (ref 1–4.8)
LYMPHOCYTES RELATIVE PERCENT: 22.7 %
MCH RBC QN AUTO: 27.4 PG (ref 27–31.3)
MCHC RBC AUTO-ENTMCNC: 32.7 % (ref 33–37)
MCV RBC AUTO: 83.9 FL (ref 82–100)
MONOCYTES ABSOLUTE: 0.5 K/UL (ref 0.2–0.8)
MONOCYTES RELATIVE PERCENT: 7.6 %
NEUTROPHILS ABSOLUTE: 4.4 K/UL (ref 1.4–6.5)
NEUTROPHILS RELATIVE PERCENT: 69.1 %
PDW BLD-RTO: 15.3 % (ref 11.5–14.5)
PLATELET # BLD: 242 K/UL (ref 130–400)
POTASSIUM REFLEX MAGNESIUM: 3.8 MEQ/L (ref 3.4–4.9)
RBC # BLD: 4.82 M/UL (ref 4.2–5.4)
SODIUM BLD-SCNC: 140 MEQ/L (ref 135–144)
WBC # BLD: 6.3 K/UL (ref 4.8–10.8)

## 2020-03-12 PROCEDURE — 6360000002 HC RX W HCPCS: Performed by: NURSE PRACTITIONER

## 2020-03-12 PROCEDURE — G0378 HOSPITAL OBSERVATION PER HR: HCPCS

## 2020-03-12 PROCEDURE — 96376 TX/PRO/DX INJ SAME DRUG ADON: CPT

## 2020-03-12 PROCEDURE — 6370000000 HC RX 637 (ALT 250 FOR IP): Performed by: INTERNAL MEDICINE

## 2020-03-12 PROCEDURE — 2580000003 HC RX 258: Performed by: NURSE PRACTITIONER

## 2020-03-12 PROCEDURE — 80048 BASIC METABOLIC PNL TOTAL CA: CPT

## 2020-03-12 PROCEDURE — 6370000000 HC RX 637 (ALT 250 FOR IP): Performed by: NURSE PRACTITIONER

## 2020-03-12 PROCEDURE — 99218 PR INITIAL OBSERVATION CARE/DAY 30 MINUTES: CPT | Performed by: RADIOLOGY

## 2020-03-12 PROCEDURE — 36415 COLL VENOUS BLD VENIPUNCTURE: CPT

## 2020-03-12 PROCEDURE — 85025 COMPLETE CBC W/AUTO DIFF WBC: CPT

## 2020-03-12 RX ORDER — IBUPROFEN 600 MG/1
600 TABLET ORAL EVERY 6 HOURS PRN
Qty: 120 TABLET | Refills: 3 | Status: SHIPPED | OUTPATIENT
Start: 2020-03-12 | End: 2021-03-03

## 2020-03-12 RX ORDER — POLYETHYLENE GLYCOL 3350 17 G/17G
17 POWDER, FOR SOLUTION ORAL DAILY PRN
Qty: 527 G | Refills: 1 | Status: SHIPPED | OUTPATIENT
Start: 2020-03-12 | End: 2020-04-11

## 2020-03-12 RX ORDER — OXYCODONE HYDROCHLORIDE AND ACETAMINOPHEN 5; 325 MG/1; MG/1
1 TABLET ORAL EVERY 6 HOURS PRN
Status: DISCONTINUED | OUTPATIENT
Start: 2020-03-12 | End: 2020-03-12 | Stop reason: HOSPADM

## 2020-03-12 RX ORDER — OXYCODONE HYDROCHLORIDE AND ACETAMINOPHEN 5; 325 MG/1; MG/1
1 TABLET ORAL EVERY 8 HOURS PRN
Qty: 9 TABLET | Refills: 0 | Status: SHIPPED | OUTPATIENT
Start: 2020-03-12 | End: 2020-03-15

## 2020-03-12 RX ADMIN — KETOROLAC TROMETHAMINE 30 MG: 30 INJECTION, SOLUTION INTRAMUSCULAR; INTRAVENOUS at 11:58

## 2020-03-12 RX ADMIN — KETOROLAC TROMETHAMINE 30 MG: 30 INJECTION, SOLUTION INTRAMUSCULAR; INTRAVENOUS at 17:08

## 2020-03-12 RX ADMIN — PANTOPRAZOLE SODIUM 40 MG: 40 TABLET, DELAYED RELEASE ORAL at 06:43

## 2020-03-12 RX ADMIN — AMLODIPINE BESYLATE 5 MG: 5 TABLET ORAL at 08:38

## 2020-03-12 RX ADMIN — KETOROLAC TROMETHAMINE 30 MG: 30 INJECTION, SOLUTION INTRAMUSCULAR; INTRAVENOUS at 06:43

## 2020-03-12 RX ADMIN — OXYCODONE HYDROCHLORIDE AND ACETAMINOPHEN 1 TABLET: 5; 325 TABLET ORAL at 13:41

## 2020-03-12 RX ADMIN — Medication 10 ML: at 08:38

## 2020-03-12 RX ADMIN — TRAMADOL HYDROCHLORIDE 100 MG: 50 TABLET, FILM COATED ORAL at 03:41

## 2020-03-12 RX ADMIN — TRAMADOL HYDROCHLORIDE 100 MG: 50 TABLET, FILM COATED ORAL at 11:08

## 2020-03-12 ASSESSMENT — ENCOUNTER SYMPTOMS
VOMITING: 0
COUGH: 0
CONSTIPATION: 0
GASTROINTESTINAL NEGATIVE: 1
EYES NEGATIVE: 1
NAUSEA: 0
SHORTNESS OF BREATH: 0
RESPIRATORY NEGATIVE: 1
WHEEZING: 0
PHOTOPHOBIA: 0
DIARRHEA: 0
BACK PAIN: 0
ABDOMINAL PAIN: 0

## 2020-03-12 ASSESSMENT — PAIN SCALES - GENERAL
PAINLEVEL_OUTOF10: 9
PAINLEVEL_OUTOF10: 10
PAINLEVEL_OUTOF10: 8
PAINLEVEL_OUTOF10: 7
PAINLEVEL_OUTOF10: 4

## 2020-03-12 NOTE — CONSULTS
Attends Congregation service: None     Active member of club or organization: None     Attends meetings of clubs or organizations: None     Relationship status: None    Intimate partner violence     Fear of current or ex partner: None     Emotionally abused: None     Physically abused: None     Forced sexual activity: None   Other Topics Concern    None   Social History Narrative    None       Allergies   Allergen Reactions    Metronidazole Hives       Current Facility-Administered Medications on File Prior to Encounter   Medication Dose Route Frequency Provider Last Rate Last Dose    0.9 % sodium chloride bolus  250 mL Intravenous PRN MARIANGEL Morrell  mL/hr at 03/11/20 1144 250 mL at 03/11/20 1144    0.9 % sodium chloride bolus  1,000 mL Intravenous PRN MARIANGEL Morrell  mL/hr at 03/11/20 1138 1,000 mL at 03/11/20 1138    fentaNYL (SUBLIMAZE) injection 50 mcg  50 mcg Intravenous PRN Jacky Pride APRN - CNP   50 mcg at 03/11/20 1159    lidocaine 2 % injection 10 mL  10 mL Intradermal PRN MARIANGEL Morrell - CNP   10 mL at 03/11/20 1105    midazolam (VERSED) injection 0.5 mg  0.5 mg Intravenous PRN MARIANGEL Morrell - CNP   0.5 mg at 03/11/20 1158    neomycin-bacitracin-polymyxin (NEOSPORIN) ointment   Topical Once MARIANGEL Morrell CNP        metoprolol (LOPRESSOR) injection 2.5 mg  2.5 mg Intravenous PRN Miryam Beltran MD         Current Outpatient Medications on File Prior to Encounter   Medication Sig Dispense Refill    amLODIPine (NORVASC) 5 MG tablet Take 5 mg by mouth daily      ibuprofen (ADVIL;MOTRIN) 200 MG CAPS Take 200 mg by mouth      omeprazole (PRILOSEC) 20 MG delayed release capsule Take 20 mg by mouth      ondansetron (ZOFRAN ODT) 4 MG disintegrating tablet Take 1 tablet by mouth every 8 hours as needed for Nausea 20 tablet 0       Review of Systems   Constitutional: Negative. Negative for chills, diaphoresis and fever.    HENT: Negative. Negative for congestion, ear pain, hearing loss and tinnitus. Eyes: Negative. Negative for photophobia. Respiratory: Negative. Negative for cough, shortness of breath and wheezing. Cardiovascular: Negative. Negative for chest pain, palpitations and leg swelling. Gastrointestinal: Negative. Negative for abdominal pain, constipation, diarrhea, nausea and vomiting. Genitourinary: Positive for pelvic pain. Negative for dysuria, flank pain, frequency, hematuria and urgency. Musculoskeletal: Negative. Negative for back pain and neck pain. Skin: Negative. Negative for rash. Allergic/Immunologic: Negative for environmental allergies. Neurological: Negative. Negative for dizziness, tremors, weakness and headaches. Hematological: Does not bruise/bleed easily. Psychiatric/Behavioral: Negative. Negative for hallucinations and suicidal ideas. The patient is not nervous/anxious. OBJECTIVE:  BP (!) 147/88   Pulse 70   Temp 97.7 °F (36.5 °C) (Oral)   Resp 18   LMP 12/05/2019   SpO2 97%     Physical Exam  Constitutional:       General: She is not in acute distress. Appearance: She is well-developed. She is not diaphoretic. HENT:      Head: Normocephalic and atraumatic. Nose: Nose normal.      Mouth/Throat:      Pharynx: No oropharyngeal exudate. Eyes:      General: No scleral icterus. Right eye: No discharge. Left eye: No discharge. Conjunctiva/sclera: Conjunctivae normal.   Neck:      Musculoskeletal: Neck supple. Thyroid: No thyromegaly. Vascular: No JVD. Trachea: No tracheal deviation. Cardiovascular:      Rate and Rhythm: Normal rate and regular rhythm. Heart sounds: Normal heart sounds. No murmur. No friction rub. No gallop. Pulmonary:      Effort: No respiratory distress. Breath sounds: No stridor. No wheezing or rales. Chest:      Chest wall: No tenderness.    Abdominal:      General: Bowel sounds are normal.

## 2020-03-12 NOTE — DISCHARGE SUMMARY
Physician Discharge Summary     Patient ID:  Emily Colmenares  [id number]  63 y.o.  1980    Admit date: 3/11/2020    Discharge date : 03/12/20     Admitting Physician: Solomon Farley DO     Discharge Physician: Solomon Farley DO     Admission Diagnoses: Abdominal pain [R10.9]    Discharge Diagnoses: Post surgical pain sp uterine fibroid embolization    Admission Condition: fair    Discharged Condition: good    Hospital Course: Patient was a direct admit from outpatient surgery after having a uterine fibroid embolization and subsequently having severe intractable pain. Patient was admitted and pain controlled with medications. Pain improved the next day and patient transitioned to PO pain meds and given Rx for 3 days of percocet. Patient will follow up with her PCP. Consults: none      Discharge Exam:  Constitutional: Awake and alert in no acute distress. Lying in bed comfortably  Head: Normocephalic, atraumatic  Eyes: EOMI, PERRLA  ENT: moist mucous membranes  Neck: neck supple, trachea midline  Lungs: Good inspiratory effort, CTABL, no wheeze, no rhonchi, no rales  Heart: RRR, normal S1 and S2, no murmurs  GI: Soft, non-distended, no rebound, +BS  MSK: Full ROM bilaterally, 5/5 strength bilaterally, no edema noted  Pulses: 2+ pulses bilaterally  Skin: warm, dry  Psych: appropriate affect       Labs:   Recent Labs     03/12/20  0655   WBC 6.3   HGB 13.2   HCT 40.4        Recent Labs     03/12/20  0655      K 3.8      CO2 20   BUN 8   CREATININE 0.63   CALCIUM 8.7     No results for input(s): AST, ALT, BILIDIR, BILITOT, ALKPHOS in the last 72 hours. No results for input(s): INR in the last 72 hours. No results for input(s): Levonia Citrin in the last 72 hours.     Urinalysis:   Lab Results   Component Value Date    NITRU Negative 09/03/2018    WBCUA 20-50 09/03/2018    BACTERIA Moderate 09/03/2018    RBCUA 0-2 09/03/2018    BLOODU Negative 09/03/2018    SPECGRAV 1.024 09/03/2018 GLUCOSEU 100 09/03/2018       Radiology:   Most recent    Chest CT      WITH CONTRAST:No results found for this or any previous visit. WITHOUT CONTRAST: No results found for this or any previous visit. CXR      2-view:   Results for orders placed during the hospital encounter of 02/05/20   XR CHEST STANDARD (2 VW)    Narrative EXAMINATION: XR CHEST (2 VW)     CLINICAL HISTORY: R52 Pain ICD10 no history of trauma reported    COMPARISONS: None     FINDINGS:    Two views of the chest are submitted. Limited exam due to low lung volume  The cardiac silhouette is enlarged. The mediastinum is unremarkable. Pulmonary vascular unremarkable. Right sided trachea. No focal infiltrates. No effusions. No Pneumothoraces. Impression NO ACUTE ACTIVE CARDIOPULMONARY PROCESS        Portable: No results found for this or any previous visit. Echo No results found for this or any previous visit. Disposition: home    In process/preliminary results:  Outstanding Order Results     No orders found for last 30 day(s). Patient Instructions:   Current Discharge Medication List      START taking these medications    Details   oxyCODONE-acetaminophen (PERCOCET) 5-325 MG per tablet Take 1 tablet by mouth every 8 hours as needed for Pain for up to 3 days.   Qty: 9 tablet, Refills: 0    Comments: Reduce doses taken as pain becomes manageable  Associated Diagnoses: Lower abdominal pain      ibuprofen (ADVIL;MOTRIN) 600 MG tablet Take 1 tablet by mouth every 6 hours as needed for Pain  Qty: 120 tablet, Refills: 3      polyethylene glycol (GLYCOLAX) packet Take 17 g by mouth daily as needed for Constipation  Qty: 527 g, Refills: 1         CONTINUE these medications which have NOT CHANGED    Details   amLODIPine (NORVASC) 5 MG tablet Take 5 mg by mouth daily      omeprazole (PRILOSEC) 20 MG delayed release capsule Take 20 mg by mouth ondansetron (ZOFRAN ODT) 4 MG disintegrating tablet Take 1 tablet by mouth every 8 hours as needed for Nausea  Qty: 20 tablet, Refills: 0         STOP taking these medications       ibuprofen (ADVIL;MOTRIN) 200 MG CAPS Comments:   Reason for Stopping:             Activity: activity as tolerated  Diet: regular diet  Wound Care: as directed    Follow-up with Milly Rudolph MD  in 1 week.     DC time 35 minutes    Signed:  Electronically signed by La Ellington DO on 3/12/2020 at 4:10 PM

## 2020-03-13 ENCOUNTER — TELEPHONE (OUTPATIENT)
Dept: INTERVENTIONAL RADIOLOGY/VASCULAR | Age: 40
End: 2020-03-13

## 2020-03-13 PROCEDURE — 96375 TX/PRO/DX INJ NEW DRUG ADDON: CPT

## 2020-03-13 PROCEDURE — 96372 THER/PROPH/DIAG INJ SC/IM: CPT

## 2020-03-13 PROCEDURE — 96376 TX/PRO/DX INJ SAME DRUG ADON: CPT

## 2020-03-13 PROCEDURE — 96374 THER/PROPH/DIAG INJ IV PUSH: CPT

## 2020-03-13 NOTE — TELEPHONE ENCOUNTER
Pt called stating she may have an allergy to Percocet. Pt states she has never taken Percocet before and she took one last night and since has been itching. I advised pt to stop taking the medication and follow up with her GYN or PCP, per Marianne Hewitt. Pt understood and didn't have any other questions at the time.

## 2020-03-16 ENCOUNTER — TELEPHONE (OUTPATIENT)
Dept: INTERVENTIONAL RADIOLOGY/VASCULAR | Age: 40
End: 2020-03-16

## 2020-03-16 NOTE — TELEPHONE ENCOUNTER
Pt states she thinks when the nurse pulled the folley out that they cut her because it burns when she uses the restroom or sits down.   pls call pt and advise

## 2020-03-17 NOTE — TELEPHONE ENCOUNTER
Have her follow up with her PCP or GYN as she may have UTI or some other medical condition not related to the procedure.

## 2020-04-13 ENCOUNTER — VIRTUAL VISIT (OUTPATIENT)
Dept: INTERVENTIONAL RADIOLOGY/VASCULAR | Age: 40
End: 2020-04-13
Payer: MEDICARE

## 2020-04-13 PROBLEM — D25.9 FIBROID, UTERINE: Status: ACTIVE | Noted: 2020-04-13

## 2020-04-13 PROCEDURE — 99214 OFFICE O/P EST MOD 30 MIN: CPT | Performed by: NURSE PRACTITIONER

## 2020-04-13 PROCEDURE — G8417 CALC BMI ABV UP PARAM F/U: HCPCS | Performed by: NURSE PRACTITIONER

## 2020-04-13 PROCEDURE — G8428 CUR MEDS NOT DOCUMENT: HCPCS | Performed by: NURSE PRACTITIONER

## 2020-04-13 PROCEDURE — 1036F TOBACCO NON-USER: CPT | Performed by: NURSE PRACTITIONER

## 2020-04-13 ASSESSMENT — ENCOUNTER SYMPTOMS
GASTROINTESTINAL NEGATIVE: 1
RESPIRATORY NEGATIVE: 1
EYES NEGATIVE: 1

## 2020-05-08 ENCOUNTER — TELEPHONE (OUTPATIENT)
Dept: INTERVENTIONAL RADIOLOGY/VASCULAR | Age: 40
End: 2020-05-08

## 2020-05-08 NOTE — TELEPHONE ENCOUNTER
Pt called stating she was in pain and thought she should have a period by now from procedure in march. Pt states pain has been since wednesday and is sharp  And cant walk. Pt states pain is in her abdominal.   Talked with luz and I informed pt to call dr Yanelis Martinez  And if pain was extreme to go to er.

## 2020-05-11 ENCOUNTER — OFFICE VISIT (OUTPATIENT)
Dept: OBGYN CLINIC | Age: 40
End: 2020-05-11
Payer: MEDICARE

## 2020-05-11 VITALS
WEIGHT: 268 LBS | DIASTOLIC BLOOD PRESSURE: 68 MMHG | BODY MASS INDEX: 50.6 KG/M2 | SYSTOLIC BLOOD PRESSURE: 104 MMHG | HEART RATE: 100 BPM | HEIGHT: 61 IN

## 2020-05-11 PROCEDURE — 1036F TOBACCO NON-USER: CPT | Performed by: OBSTETRICS & GYNECOLOGY

## 2020-05-11 PROCEDURE — G8427 DOCREV CUR MEDS BY ELIG CLIN: HCPCS | Performed by: OBSTETRICS & GYNECOLOGY

## 2020-05-11 PROCEDURE — G8417 CALC BMI ABV UP PARAM F/U: HCPCS | Performed by: OBSTETRICS & GYNECOLOGY

## 2020-05-11 PROCEDURE — 99213 OFFICE O/P EST LOW 20 MIN: CPT | Performed by: OBSTETRICS & GYNECOLOGY

## 2020-06-02 ENCOUNTER — HOSPITAL ENCOUNTER (OUTPATIENT)
Dept: MRI IMAGING | Age: 40
Discharge: HOME | End: 2020-06-04
Payer: MEDICARE

## 2020-06-02 PROCEDURE — 72197 MRI PELVIS W/O & W/DYE: CPT

## 2020-06-02 PROCEDURE — 6360000004 HC RX CONTRAST MEDICATION: Performed by: NURSE PRACTITIONER

## 2020-06-02 PROCEDURE — A9579 GAD-BASE MR CONTRAST NOS,1ML: HCPCS | Performed by: NURSE PRACTITIONER

## 2020-06-02 RX ORDER — SODIUM CHLORIDE 9 MG/ML
10 INJECTION INTRAVENOUS ONCE
Status: DISCONTINUED | OUTPATIENT
Start: 2020-06-02 | End: 2020-06-05 | Stop reason: HOSPADM

## 2020-06-02 RX ADMIN — GADOTERIDOL 25 ML: 279.3 INJECTION, SOLUTION INTRAVENOUS at 10:08

## 2020-06-22 ENCOUNTER — OFFICE VISIT (OUTPATIENT)
Dept: INTERVENTIONAL RADIOLOGY/VASCULAR | Age: 40
End: 2020-06-22
Payer: MEDICARE

## 2020-06-22 VITALS
SYSTOLIC BLOOD PRESSURE: 124 MMHG | RESPIRATION RATE: 16 BRPM | WEIGHT: 268 LBS | BODY MASS INDEX: 50.6 KG/M2 | OXYGEN SATURATION: 98 % | HEART RATE: 85 BPM | HEIGHT: 61 IN | DIASTOLIC BLOOD PRESSURE: 86 MMHG

## 2020-06-22 PROCEDURE — 1036F TOBACCO NON-USER: CPT | Performed by: NURSE PRACTITIONER

## 2020-06-22 PROCEDURE — G8427 DOCREV CUR MEDS BY ELIG CLIN: HCPCS | Performed by: NURSE PRACTITIONER

## 2020-06-22 PROCEDURE — 99215 OFFICE O/P EST HI 40 MIN: CPT | Performed by: NURSE PRACTITIONER

## 2020-06-22 PROCEDURE — G8417 CALC BMI ABV UP PARAM F/U: HCPCS | Performed by: NURSE PRACTITIONER

## 2020-06-22 ASSESSMENT — ENCOUNTER SYMPTOMS
GASTROINTESTINAL NEGATIVE: 1
RESPIRATORY NEGATIVE: 1
EYES NEGATIVE: 1

## 2020-06-22 NOTE — PROGRESS NOTES
Cecelia Montano, a female of 46 y.o. came to the office 2020. Chief Complaint   Patient presents with    Results     MRI OF PELVIS     SUBJECTIVE:     2020:   Cecelia Montano is here for follow up MRI results of S/p 3 month UFE for symptoms including irregular heavy menses and pelvic pain and pressure caused by multiple large leiomyomas. Follow up MRI reports several small with large leiomyoma remaining. She states her pelvic pain is considerably decreased now and she is actually going back to work in July. Still has not had menses since Last 2019. States she thinks she's having the pelvic discomfort only once a week now as opposed to daily as is no longer requiring to take pain medication as is much milder. 2020 VV:   Cecelia Montano (:  1980) has requested an audio/video evaluation for the following concern(s):  S/P 4 week UFE with symptoms of painful irregular heavy menses x 2 years. States she has not yet had a period since the embolization. Last period was 2019. She now has some very mild lower pelvic pressure a few times a day, otherwise she states the pain is almost gone. She is no longer having to take any pain medication. She is very pleased with results. She had also experienced some burning with urination starting post op day two, was placed on oral ATB by PCP and Is no longer experiencing any burning. Follow up MRI scheduled 2020, to evaluate if fibroids have diminished in size due to embolization. She would routinely have gotten an MRI week 4 post op however due to Covid precautions and not able to schedule anything non-emergent at this time, will plan for follow up MRI as stated above.        2020 HPI: Cecelia Montano presents to clinic for consultation at the request of her GYN Dr. Judy Falcon for Uterine Fibroid  Embolization for Fibroid Uterine Tumors causing irregular painful heavy menses x 2 years with progression.  Has not given birth and at this time is unsure if she wishes fertility in future. Family History   Problem Relation Age of Onset    Diabetes Paternal Grandmother     Diabetes Father     Diabetes Paternal Aunt     Diabetes Paternal Uncle     Breast Cancer Neg Hx     Cancer Neg Hx     Colon Cancer Neg Hx     Eclampsia Neg Hx     Ovarian Cancer Neg Hx     Hypertension Neg Hx      Labor Neg Hx     Spont Abortions Neg Hx     Stroke Neg Hx        Past Surgical History:   Procedure Laterality Date    FOOT SURGERY      cyst on right foot as a child    UTERINE FIBROID EMBOLIZATION  2020    Dr Fili White [phone]        Past Medical History:   Diagnosis Date    Abnormal Pap smear of cervix     Breast disorder     Fibroid        Social History     Socioeconomic History    Marital status: Single     Spouse name: None    Number of children: None    Years of education: None    Highest education level: None   Occupational History    None   Social Needs    Financial resource strain: None    Food insecurity     Worry: None     Inability: None    Transportation needs     Medical: None     Non-medical: None   Tobacco Use    Smoking status: Former Smoker    Smokeless tobacco: Former User   Substance and Sexual Activity    Alcohol use: Yes     Comment:  On \"occasion liquor\"    Drug use: No    Sexual activity: Yes     Partners: Male     Comment: Condoms   Lifestyle    Physical activity     Days per week: None     Minutes per session: None    Stress: None   Relationships    Social connections     Talks on phone: None     Gets together: None     Attends Taoist service: None     Active member of club or organization: None     Attends meetings of clubs or organizations: None     Relationship status: None    Intimate partner violence     Fear of current or ex partner: None     Emotionally abused: None     Physically abused: None     Forced sexual activity: None   Other Topics Concern    None   Social History Narrative    None       Allergies   Allergen Reactions    Metronidazole Hives       Current Outpatient Medications on File Prior to Visit   Medication Sig Dispense Refill    ibuprofen (ADVIL;MOTRIN) 600 MG tablet Take 1 tablet by mouth every 6 hours as needed for Pain 120 tablet 3    amLODIPine (NORVASC) 5 MG tablet Take 5 mg by mouth daily      omeprazole (PRILOSEC) 20 MG delayed release capsule Take 20 mg by mouth      ondansetron (ZOFRAN ODT) 4 MG disintegrating tablet Take 1 tablet by mouth every 8 hours as needed for Nausea 20 tablet 0     No current facility-administered medications on file prior to visit. Review of Systems   Constitutional: Negative for chills, fatigue and fever. HENT: Negative. Eyes: Negative. Respiratory: Negative. Cardiovascular: Negative. Gastrointestinal: Negative. Endocrine: Negative. Genitourinary: Positive for pelvic pain (with menses). No menses since November 2011. Musculoskeletal: Negative. Skin: Negative. Neurological: Negative. Hematological: Negative. Psychiatric/Behavioral: Negative. OBJECTIVE:  /86   Pulse 85   Resp 16   Ht 5' 1\" (1.549 m)   Wt 268 lb (121.6 kg)   SpO2 98% Comment: on room air  BMI 50.64 kg/m²     Physical Exam  Constitutional:       Appearance: Normal appearance. HENT:      Head: Normocephalic and atraumatic. Nose: Nose normal. No congestion. Mouth/Throat:      Mouth: Mucous membranes are moist.      Pharynx: Oropharynx is clear. No oropharyngeal exudate. Neck:      Musculoskeletal: Normal range of motion and neck supple. Cardiovascular:      Rate and Rhythm: Normal rate and regular rhythm. Heart sounds: Normal heart sounds. Pulmonary:      Effort: Pulmonary effort is normal.      Breath sounds: Normal breath sounds. Abdominal:      General: Bowel sounds are normal. There is no distension. Tenderness: There is no abdominal tenderness.

## 2020-07-08 ENCOUNTER — OFFICE VISIT (OUTPATIENT)
Dept: OBGYN CLINIC | Age: 40
End: 2020-07-08
Payer: MEDICARE

## 2020-07-08 VITALS
HEART RATE: 84 BPM | SYSTOLIC BLOOD PRESSURE: 104 MMHG | BODY MASS INDEX: 47.95 KG/M2 | HEIGHT: 61 IN | WEIGHT: 254 LBS | DIASTOLIC BLOOD PRESSURE: 66 MMHG

## 2020-07-08 PROCEDURE — G8427 DOCREV CUR MEDS BY ELIG CLIN: HCPCS | Performed by: OBSTETRICS & GYNECOLOGY

## 2020-07-08 PROCEDURE — 1036F TOBACCO NON-USER: CPT | Performed by: OBSTETRICS & GYNECOLOGY

## 2020-07-08 PROCEDURE — 99214 OFFICE O/P EST MOD 30 MIN: CPT | Performed by: OBSTETRICS & GYNECOLOGY

## 2020-07-08 PROCEDURE — G8417 CALC BMI ABV UP PARAM F/U: HCPCS | Performed by: OBSTETRICS & GYNECOLOGY

## 2020-07-08 RX ORDER — POLYETHYLENE GLYCOL 3350 17 G/17G
POWDER, FOR SOLUTION ORAL
Qty: 1020 G | Refills: 0 | Status: SHIPPED | OUTPATIENT
Start: 2020-07-08 | End: 2021-03-03

## 2020-07-08 RX ORDER — CLINDAMYCIN PHOSPHATE 20 MG/G
CREAM VAGINAL
Qty: 1 TUBE | Refills: 0 | Status: SHIPPED | OUTPATIENT
Start: 2020-07-08 | End: 2021-03-03

## 2020-07-15 NOTE — PROGRESS NOTES
Vivian Waldron is a 46 y.o. female who presents here today for complaints of Pelvic pain. Discuss hysterectomy. Had MRI done 20 which showed fibroids have not shrunk. S/p [place] > 6 mnth ago. MRI :   Uterus:         Size:  8.9 x 5.1 x 4.8 cm (excluding the large fibroid as below)           Orientation: Anteverted         Endometrium: Maximum width measures 8 mm. No endometrial lesion.         Junctional zone: Maximum thickness: 8 mm.  Homogeneous T2 hypointense signal.         Cervix: Nabothian cysts, otherwise unremarkable.         Leiomyomas: Leiomyomas present.  Index lesions as below:         Leiomyoma 1    Size: 14.7 x 9.9 x 14.2 cm (series 4 image 23, series 3 image 20)    Type: subserosal and exophytic    Location within the uterus: Exophytic from the fundus    Percent enhancement: 0%    Borders: Smooth    Other: This leiomyoma is mostly homogeneous with some areas of heterogeneous T2 signal along the superior aspect.         There are other small intramural leiomyomas without enhancement measuring 8mm in the right fundus and 10 mm in the left uterine segment         Adenomyomas: None            Ovaries:         - Right ovary:    Measures approximately 2.8 x 1.4 x 2.6 cm, unremarkable.         - Left ovary:    Measures 4.2 x 2.1 x 2.7 cm, unremarkable     .       Vitals:  /66 (Site: Right Upper Arm, Position: Sitting, Cuff Size: Large Adult)   Pulse 84   Ht 5' 1\" (1.549 m)   Wt 254 lb (115.2 kg)   BMI 47.99 kg/m²   Allergies:  Metronidazole  Past Medical History:   Diagnosis Date    Abnormal Pap smear of cervix     Breast disorder     Fibroid      Past Surgical History:   Procedure Laterality Date    FOOT SURGERY      cyst on right foot as a child    UTERINE FIBROID EMBOLIZATION  2020    Dr Hanane Mann [phone]     OB History        0    Para   0    Term   0       0    AB   0    Living   0       SAB   0    TAB   0    Ectopic   0    Molar   0    Multiple 0    Live Births   0              Family History   Problem Relation Age of Onset    Diabetes Paternal Grandmother     Diabetes Father     Diabetes Paternal Aunt     Diabetes Paternal Uncle     Breast Cancer Neg Hx     Cancer Neg Hx     Colon Cancer Neg Hx     Eclampsia Neg Hx     Ovarian Cancer Neg Hx     Hypertension Neg Hx      Labor Neg Hx     Spont Abortions Neg Hx     Stroke Neg Hx      Social History     Socioeconomic History    Marital status: Single     Spouse name: Not on file    Number of children: Not on file    Years of education: Not on file    Highest education level: Not on file   Occupational History    Not on file   Social Needs    Financial resource strain: Not on file    Food insecurity     Worry: Not on file     Inability: Not on file    Transportation needs     Medical: Not on file     Non-medical: Not on file   Tobacco Use    Smoking status: Former Smoker    Smokeless tobacco: Former User   Substance and Sexual Activity    Alcohol use: Yes     Comment:  On \"occasion liquor\"    Drug use: No    Sexual activity: Yes     Partners: Male     Comment: Condoms   Lifestyle    Physical activity     Days per week: Not on file     Minutes per session: Not on file    Stress: Not on file   Relationships    Social connections     Talks on phone: Not on file     Gets together: Not on file     Attends Christian service: Not on file     Active member of club or organization: Not on file     Attends meetings of clubs or organizations: Not on file     Relationship status: Not on file    Intimate partner violence     Fear of current or ex partner: Not on file     Emotionally abused: Not on file     Physically abused: Not on file     Forced sexual activity: Not on file   Other Topics Concern    Not on file   Social History Narrative    Not on file       Contraceptive method:  none    Patient's medications, allergies, past medical, surgical, social and family histories were (MIRALAX) 17 GM/SCOOP powder     Sig: Use ONE CAPEFUL at 10 am and then 2 pm on day prior to procedure     Dispense:  1020 g     Refill:  0    clindamycin (CLEOCIN) 2 % vaginal cream     Sig: USE one applicatorful vaginally nightly daily for 1 week prior to procedure     Dispense:  1 Tube     Refill:  0     Patient was seen with total face to face time of 25 minutes. More than 50% of this visit was counseling and education regarding The primary encounter diagnosis was Pelvic pain in female. Diagnoses of Bulky or enlarged uterus and Intramural leiomyoma of uterus were also pertinent to this visit. and Follow-up (Pelvic pain. Discuss hysterectomy. Had MRI done 6/2/20 which showed fibroids have not shrunk. )   as well as  counseling on preventative health maintenance follow-up. Follow Up:  No follow-ups on file.         Fernando Hernandez MD

## 2020-07-31 ENCOUNTER — TELEPHONE (OUTPATIENT)
Dept: OBGYN CLINIC | Age: 40
End: 2020-07-31

## 2020-10-08 ENCOUNTER — HOSPITAL ENCOUNTER (EMERGENCY)
Age: 40
Discharge: HOME | End: 2020-10-08
Payer: MEDICARE

## 2020-10-08 VITALS
TEMPERATURE: 97.1 F | HEART RATE: 88 BPM | HEIGHT: 60 IN | SYSTOLIC BLOOD PRESSURE: 139 MMHG | BODY MASS INDEX: 41.23 KG/M2 | RESPIRATION RATE: 19 BRPM | OXYGEN SATURATION: 97 % | WEIGHT: 210 LBS | DIASTOLIC BLOOD PRESSURE: 99 MMHG

## 2020-10-08 PROCEDURE — 2580000003 HC RX 258: Performed by: PHYSICIAN ASSISTANT

## 2020-10-08 PROCEDURE — 6370000000 HC RX 637 (ALT 250 FOR IP): Performed by: PHYSICIAN ASSISTANT

## 2020-10-08 PROCEDURE — 99284 EMERGENCY DEPT VISIT MOD MDM: CPT

## 2020-10-08 PROCEDURE — 12002 RPR S/N/AX/GEN/TRNK2.6-7.5CM: CPT

## 2020-10-08 PROCEDURE — 96372 THER/PROPH/DIAG INJ SC/IM: CPT

## 2020-10-08 PROCEDURE — 99283 EMERGENCY DEPT VISIT LOW MDM: CPT

## 2020-10-08 PROCEDURE — 6360000002 HC RX W HCPCS: Performed by: PHYSICIAN ASSISTANT

## 2020-10-08 RX ORDER — KETOROLAC TROMETHAMINE 30 MG/ML
60 INJECTION, SOLUTION INTRAMUSCULAR; INTRAVENOUS ONCE
Status: COMPLETED | OUTPATIENT
Start: 2020-10-08 | End: 2020-10-08

## 2020-10-08 RX ORDER — DIAPER,BRIEF,INFANT-TODD,DISP
EACH MISCELLANEOUS ONCE
Status: COMPLETED | OUTPATIENT
Start: 2020-10-08 | End: 2020-10-08

## 2020-10-08 RX ORDER — IBUPROFEN 800 MG/1
800 TABLET ORAL EVERY 8 HOURS PRN
Qty: 20 TABLET | Refills: 0 | Status: SHIPPED | OUTPATIENT
Start: 2020-10-08 | End: 2021-01-11 | Stop reason: ALTCHOICE

## 2020-10-08 RX ORDER — MAGNESIUM HYDROXIDE 1200 MG/15ML
1000 LIQUID ORAL ONCE
Status: COMPLETED | OUTPATIENT
Start: 2020-10-08 | End: 2020-10-08

## 2020-10-08 RX ORDER — AMOXICILLIN AND CLAVULANATE POTASSIUM 875; 125 MG/1; MG/1
1 TABLET, FILM COATED ORAL 2 TIMES DAILY
Qty: 20 TABLET | Refills: 0 | Status: SHIPPED | OUTPATIENT
Start: 2020-10-08 | End: 2020-10-18

## 2020-10-08 RX ORDER — AMOXICILLIN AND CLAVULANATE POTASSIUM 875; 125 MG/1; MG/1
1 TABLET, FILM COATED ORAL ONCE
Status: COMPLETED | OUTPATIENT
Start: 2020-10-08 | End: 2020-10-08

## 2020-10-08 RX ADMIN — AMOXICILLIN AND CLAVULANATE POTASSIUM 1 TABLET: 875; 125 TABLET, FILM COATED ORAL at 11:55

## 2020-10-08 RX ADMIN — KETOROLAC TROMETHAMINE 60 MG: 30 INJECTION, SOLUTION INTRAMUSCULAR; INTRAVENOUS at 12:52

## 2020-10-08 RX ADMIN — WATER 1000 ML: 1 IRRIGANT IRRIGATION at 11:54

## 2020-10-08 RX ADMIN — BACITRACIN ZINC 1 G: 500 OINTMENT TOPICAL at 11:55

## 2020-10-08 ASSESSMENT — ENCOUNTER SYMPTOMS
VOMITING: 0
BACK PAIN: 0
NAUSEA: 0
SORE THROAT: 0
PHOTOPHOBIA: 0
SHORTNESS OF BREATH: 0
EYE PAIN: 0
RHINORRHEA: 0
COUGH: 0
ABDOMINAL PAIN: 0
DIARRHEA: 0

## 2020-10-08 ASSESSMENT — PAIN DESCRIPTION - ORIENTATION: ORIENTATION: LEFT

## 2020-10-08 ASSESSMENT — PAIN SCALES - GENERAL
PAINLEVEL_OUTOF10: 7
PAINLEVEL_OUTOF10: 7

## 2020-10-08 ASSESSMENT — PAIN DESCRIPTION - LOCATION: LOCATION: LEG

## 2020-10-08 NOTE — ED NOTES
Bed: 27  Expected date:   Expected time:   Means of arrival:   Comments:  200 Davey Galindo RN  10/08/20 [id number]

## 2020-11-27 ENCOUNTER — TELEPHONE (OUTPATIENT)
Dept: OBGYN CLINIC | Age: 40
End: 2020-11-27

## 2021-01-11 ENCOUNTER — HOSPITAL ENCOUNTER (EMERGENCY)
Age: 41
Discharge: HOME | End: 2021-01-11
Payer: MEDICARE

## 2021-01-11 ENCOUNTER — APPOINTMENT (OUTPATIENT)
Dept: CT IMAGING | Age: 41
End: 2021-01-11
Payer: MEDICARE

## 2021-01-11 VITALS
WEIGHT: 200 LBS | HEIGHT: 61 IN | TEMPERATURE: 98.2 F | BODY MASS INDEX: 37.76 KG/M2 | OXYGEN SATURATION: 99 % | SYSTOLIC BLOOD PRESSURE: 122 MMHG | DIASTOLIC BLOOD PRESSURE: 85 MMHG | HEART RATE: 68 BPM | RESPIRATION RATE: 18 BRPM

## 2021-01-11 DIAGNOSIS — D25.9 UTERINE LEIOMYOMA, UNSPECIFIED LOCATION: Primary | ICD-10-CM

## 2021-01-11 LAB
ALBUMIN SERPL-MCNC: 3.6 G/DL (ref 3.5–4.6)
ALP BLD-CCNC: 63 U/L (ref 40–130)
ALT SERPL-CCNC: 28 U/L (ref 0–33)
AMYLASE: 52 U/L (ref 22–93)
ANION GAP SERPL CALCULATED.3IONS-SCNC: 9 MEQ/L (ref 9–15)
AST SERPL-CCNC: 33 U/L (ref 0–35)
BASOPHILS ABSOLUTE: 0.1 K/UL (ref 0–0.2)
BASOPHILS RELATIVE PERCENT: 1.4 %
BILIRUB SERPL-MCNC: <0.2 MG/DL (ref 0.2–0.7)
BILIRUBIN URINE: NEGATIVE
BLOOD, URINE: NEGATIVE
BUN BLDV-MCNC: 10 MG/DL (ref 6–20)
CALCIUM SERPL-MCNC: 9.1 MG/DL (ref 8.5–9.9)
CHLORIDE BLD-SCNC: 106 MEQ/L (ref 95–107)
CLARITY: CLEAR
CO2: 26 MEQ/L (ref 20–31)
COLOR: YELLOW
CREAT SERPL-MCNC: 0.64 MG/DL (ref 0.5–0.9)
EOSINOPHILS ABSOLUTE: 0.2 K/UL (ref 0–0.7)
EOSINOPHILS RELATIVE PERCENT: 3.3 %
GFR AFRICAN AMERICAN: >60
GFR AFRICAN AMERICAN: >60
GFR NON-AFRICAN AMERICAN: >60
GFR NON-AFRICAN AMERICAN: >60
GLOBULIN: 3.2 G/DL (ref 2.3–3.5)
GLUCOSE BLD-MCNC: 84 MG/DL (ref 70–99)
GLUCOSE URINE: 100 MG/DL
HCG, URINE, POC: NEGATIVE
HCT VFR BLD CALC: 37.5 % (ref 37–47)
HEMOGLOBIN: 12.5 G/DL (ref 12–16)
KETONES, URINE: NEGATIVE MG/DL
LEUKOCYTE ESTERASE, URINE: NEGATIVE
LIPASE: 31 U/L (ref 12–95)
LYMPHOCYTES ABSOLUTE: 2.1 K/UL (ref 1–4.8)
LYMPHOCYTES RELATIVE PERCENT: 42.3 %
Lab: NORMAL
MCH RBC QN AUTO: 28.1 PG (ref 27–31.3)
MCHC RBC AUTO-ENTMCNC: 33.3 % (ref 33–37)
MCV RBC AUTO: 84.3 FL (ref 82–100)
MONOCYTES ABSOLUTE: 0.3 K/UL (ref 0.2–0.8)
MONOCYTES RELATIVE PERCENT: 6.7 %
NEGATIVE QC PASS/FAIL: NORMAL
NEUTROPHILS ABSOLUTE: 2.3 K/UL (ref 1.4–6.5)
NEUTROPHILS RELATIVE PERCENT: 46.3 %
NITRITE, URINE: NEGATIVE
PDW BLD-RTO: 15.1 % (ref 11.5–14.5)
PERFORMED ON: NORMAL
PH UA: 6 (ref 5–9)
PLATELET # BLD: 200 K/UL (ref 130–400)
POC CREATININE: 0.7 MG/DL (ref 0.6–1.1)
POC SAMPLE TYPE: NORMAL
POSITIVE QC PASS/FAIL: NORMAL
POTASSIUM SERPL-SCNC: 4.5 MEQ/L (ref 3.4–4.9)
PROTEIN UA: NEGATIVE MG/DL
RBC # BLD: 4.45 M/UL (ref 4.2–5.4)
REASON FOR REJECTION: NORMAL
REJECTED TEST: NORMAL
SODIUM BLD-SCNC: 141 MEQ/L (ref 135–144)
SPECIFIC GRAVITY UA: 1.02 (ref 1–1.03)
TOTAL PROTEIN: 6.8 G/DL (ref 6.3–8)
URINE REFLEX TO CULTURE: ABNORMAL
UROBILINOGEN, URINE: 0.2 E.U./DL
WBC # BLD: 5 K/UL (ref 4.8–10.8)

## 2021-01-11 PROCEDURE — 36415 COLL VENOUS BLD VENIPUNCTURE: CPT

## 2021-01-11 PROCEDURE — 6360000004 HC RX CONTRAST MEDICATION: Performed by: NURSE PRACTITIONER

## 2021-01-11 PROCEDURE — 83690 ASSAY OF LIPASE: CPT

## 2021-01-11 PROCEDURE — 99283 EMERGENCY DEPT VISIT LOW MDM: CPT

## 2021-01-11 PROCEDURE — 82150 ASSAY OF AMYLASE: CPT

## 2021-01-11 PROCEDURE — 80053 COMPREHEN METABOLIC PANEL: CPT

## 2021-01-11 PROCEDURE — 85025 COMPLETE CBC W/AUTO DIFF WBC: CPT

## 2021-01-11 PROCEDURE — 81003 URINALYSIS AUTO W/O SCOPE: CPT

## 2021-01-11 PROCEDURE — 74177 CT ABD & PELVIS W/CONTRAST: CPT

## 2021-01-11 RX ORDER — IBUPROFEN 800 MG/1
800 TABLET ORAL EVERY 8 HOURS PRN
Qty: 20 TABLET | Refills: 0 | Status: ON HOLD | OUTPATIENT
Start: 2021-01-11 | End: 2021-09-26

## 2021-01-11 RX ORDER — SODIUM CHLORIDE 0.9 % (FLUSH) 0.9 %
10 SYRINGE (ML) INJECTION ONCE
Status: DISCONTINUED | OUTPATIENT
Start: 2021-01-11 | End: 2021-01-11 | Stop reason: HOSPADM

## 2021-01-11 RX ADMIN — IOPAMIDOL 100 ML: 612 INJECTION, SOLUTION INTRAVENOUS at 12:14

## 2021-01-11 ASSESSMENT — PAIN SCALES - GENERAL: PAINLEVEL_OUTOF10: 8

## 2021-01-11 ASSESSMENT — ENCOUNTER SYMPTOMS
DIARRHEA: 0
COUGH: 0
SORE THROAT: 0
TROUBLE SWALLOWING: 0
VOMITING: 0
NAUSEA: 0
SHORTNESS OF BREATH: 0
ABDOMINAL PAIN: 1
BACK PAIN: 0

## 2021-01-11 ASSESSMENT — PAIN DESCRIPTION - ORIENTATION: ORIENTATION: RIGHT

## 2021-01-11 ASSESSMENT — PAIN DESCRIPTION - DESCRIPTORS: DESCRIPTORS: SHOOTING

## 2021-01-11 NOTE — ED TRIAGE NOTES
Pt presents to the ER with complaints of lower right sided abdominal pain  Pt denies NVD  Denies UTI symptoms  Pain is continuous but comes and goes intermittently

## 2021-01-11 NOTE — ED PROVIDER NOTES
[facility] [facility] ED  eMERGENCY dEPARTMENT eNCOUnter      Pt Name: Gael Valdez  MRN: [id number]  Brandyngfadin 1980  Date of evaluation: 1/11/2021  Provider: MARIANGEL Jeong CNP      HISTORY OF PRESENT ILLNESS    Gael Valdez is a 45 y.o. female who presents to the Emergency Department with R lower quadrant pain that started this AM.  Patient states it is a sharp, intermittent pain. She denies nausea, vomiting or diarrhea. Pain is moderate. REVIEW OF SYSTEMS       Review of Systems   Constitutional: Negative for activity change, appetite change and fever. HENT: Negative for congestion, drooling, ear pain, sore throat and trouble swallowing. Respiratory: Negative for cough and shortness of breath. Cardiovascular: Negative for chest pain. Gastrointestinal: Positive for abdominal pain. Negative for diarrhea, nausea and vomiting. Genitourinary: Negative for dysuria. Musculoskeletal: Negative for arthralgias, back pain and neck pain. Skin: Negative for rash. Neurological: Negative for dizziness and light-headedness. All other systems reviewed and are negative.         PAST MEDICAL HISTORY     Past Medical History:   Diagnosis Date    Abnormal Pap smear of cervix     Breast disorder     Fibroid          SURGICAL HISTORY       Past Surgical History:   Procedure Laterality Date    FOOT SURGERY      cyst on right foot as a child    UTERINE FIBROID EMBOLIZATION  03/11/2020    Dr Axel Shaw [phone]         CURRENT MEDICATIONS       Previous Medications    AMLODIPINE (NORVASC) 5 MG TABLET    Take 5 mg by mouth daily    CLINDAMYCIN (CLEOCIN) 2 % VAGINAL CREAM    USE one applicatorful vaginally nightly daily for 1 week prior to procedure    IBUPROFEN (ADVIL;MOTRIN) 600 MG TABLET    Take 1 tablet by mouth every 6 hours as needed for Pain    OMEPRAZOLE (PRILOSEC) 20 MG DELAYED RELEASE CAPSULE    Take 20 mg by mouth ONDANSETRON (ZOFRAN ODT) 4 MG DISINTEGRATING TABLET    Take 1 tablet by mouth every 8 hours as needed for Nausea    POLYETHYLENE GLYCOL (MIRALAX) 17 GM/SCOOP POWDER    Use ONE CAPEFUL at 10 am and then 2 pm on day prior to procedure       ALLERGIES     Metronidazole and Percocet [oxycodone-acetaminophen]    FAMILY HISTORY       Family History   Problem Relation Age of Onset    Diabetes Paternal Grandmother     Diabetes Father     Diabetes Paternal Aunt     Diabetes Paternal Uncle     Breast Cancer Neg Hx     Cancer Neg Hx     Colon Cancer Neg Hx     Eclampsia Neg Hx     Ovarian Cancer Neg Hx     Hypertension Neg Hx      Labor Neg Hx     Spont Abortions Neg Hx     Stroke Neg Hx           SOCIAL HISTORY       Social History     Socioeconomic History    Marital status: Single     Spouse name: None    Number of children: None    Years of education: None    Highest education level: None   Occupational History    None   Social Needs    Financial resource strain: None    Food insecurity     Worry: None     Inability: None    Transportation needs     Medical: None     Non-medical: None   Tobacco Use    Smoking status: Former Smoker    Smokeless tobacco: Former User   Substance and Sexual Activity    Alcohol use: Yes     Comment:  On \"occasion liquor\"    Drug use: No    Sexual activity: Yes     Partners: Male     Comment: Condoms   Lifestyle    Physical activity     Days per week: None     Minutes per session: None    Stress: None   Relationships    Social connections     Talks on phone: None     Gets together: None     Attends Episcopalian service: None     Active member of club or organization: None     Attends meetings of clubs or organizations: None     Relationship status: None    Intimate partner violence     Fear of current or ex partner: None     Emotionally abused: None     Physically abused: None     Forced sexual activity: None   Other Topics Concern    None Social History Narrative    None       SCREENINGS    Otis Coma Scale  Eye Opening: Spontaneous  Best Verbal Response: Oriented  Best Motor Response: Obeys commands  Otis Coma Scale Score: 15 @FLOW(29970276)@      PHYSICAL EXAM    (up to 7 for level 4, 8 or more for level 5)     ED Triage Vitals [01/11/21 1021]   BP Temp Temp Source Pulse Resp SpO2 Height Weight   134/87 98.2 °F (36.8 °C) Oral 73 16 94 % 5' 1\" (1.549 m) 200 lb (90.7 kg)       Physical Exam  Vitals signs and nursing note reviewed. Constitutional:       Appearance: She is well-developed. HENT:      Head: Normocephalic and atraumatic. Right Ear: External ear normal.      Left Ear: External ear normal.   Eyes:      Conjunctiva/sclera: Conjunctivae normal.      Pupils: Pupils are equal, round, and reactive to light. Neck:      Musculoskeletal: Normal range of motion and neck supple. Cardiovascular:      Rate and Rhythm: Normal rate and regular rhythm. Pulmonary:      Effort: Pulmonary effort is normal.      Breath sounds: Normal breath sounds. Abdominal:      General: Bowel sounds are normal. There is no distension. Palpations: Abdomen is soft. Tenderness: There is abdominal tenderness in the right lower quadrant. There is no right CVA tenderness or left CVA tenderness. Musculoskeletal: Normal range of motion. Skin:     General: Skin is warm and dry. Neurological:      Mental Status: She is alert and oriented to person, place, and time. Deep Tendon Reflexes: Reflexes are normal and symmetric. Psychiatric:         Judgment: Judgment normal.           All other labs were within normal range or not returned as of this dictation.     EMERGENCY DEPARTMENT COURSE and DIFFERENTIALDIAGNOSIS/MDM:   Vitals:    Vitals:    01/11/21 1021 01/11/21 1230   BP: 134/87 122/85   Pulse: 73 68   Resp: 16 18   Temp: 98.2 °F (36.8 °C)    TempSrc: Oral    SpO2: 94% 99%   Weight: 200 lb (90.7 kg)    Height: 5' 1\" (1.549 m) 45 yr old female with uterine fibroid. Prescription for Motrin was given to the patient. F/U With PCP in 1-2 days. Patient is comfortable at discharge. Patient verbalizes understanding. PROCEDURES:  Unless otherwise noted below, none     Procedures      FINAL IMPRESSION      1.  Uterine leiomyoma, unspecified location          DISPOSITION/PLAN   DISPOSITION Decision To Discharge 01/11/2021 01:12:23 PM          MARIANGEL Frey CNP (electronically signed)  Attending Emergency Physician     MARIANGEL Frey CNP  01/11/21 4433

## 2021-02-10 ENCOUNTER — HOSPITAL ENCOUNTER (OUTPATIENT)
Dept: NON INVASIVE DIAGNOSTICS | Age: 41
Discharge: HOME | End: 2021-02-10
Payer: MEDICARE

## 2021-02-10 LAB
EKG ATRIAL RATE: 73 BPM
EKG P AXIS: -11 DEGREES
EKG P-R INTERVAL: 138 MS
EKG Q-T INTERVAL: 416 MS
EKG QRS DURATION: 70 MS
EKG QTC CALCULATION (BAZETT): 458 MS
EKG R AXIS: 10 DEGREES
EKG T AXIS: 5 DEGREES
EKG VENTRICULAR RATE: 73 BPM

## 2021-02-10 PROCEDURE — 93005 ELECTROCARDIOGRAM TRACING: CPT | Performed by: INTERNAL MEDICINE

## 2021-03-03 ENCOUNTER — OFFICE VISIT (OUTPATIENT)
Dept: OBGYN CLINIC | Age: 41
End: 2021-03-03
Payer: MEDICARE

## 2021-03-03 ENCOUNTER — TELEPHONE (OUTPATIENT)
Dept: OBGYN CLINIC | Age: 41
End: 2021-03-03

## 2021-03-03 VITALS
HEIGHT: 60 IN | BODY MASS INDEX: 51.83 KG/M2 | SYSTOLIC BLOOD PRESSURE: 122 MMHG | WEIGHT: 264 LBS | DIASTOLIC BLOOD PRESSURE: 70 MMHG

## 2021-03-03 DIAGNOSIS — Z01.419 PAP SMEAR, AS PART OF ROUTINE GYNECOLOGICAL EXAMINATION: Primary | ICD-10-CM

## 2021-03-03 DIAGNOSIS — Z12.31 SCREENING MAMMOGRAM, ENCOUNTER FOR: ICD-10-CM

## 2021-03-03 DIAGNOSIS — R35.0 FREQUENT URINATION: ICD-10-CM

## 2021-03-03 DIAGNOSIS — Z11.51 SCREENING FOR HUMAN PAPILLOMAVIRUS: ICD-10-CM

## 2021-03-03 LAB
BILIRUBIN, POC: NORMAL
BLOOD URINE, POC: NORMAL
CLARITY, POC: CLEAR
COLOR, POC: YELLOW
GLUCOSE URINE, POC: NORMAL
KETONES, POC: NORMAL
LEUKOCYTE EST, POC: NORMAL
NITRITE, POC: NORMAL
PH, POC: 6
PROTEIN, POC: NORMAL
SPECIFIC GRAVITY, POC: 1.03
UROBILINOGEN, POC: NORMAL

## 2021-03-03 PROCEDURE — G8417 CALC BMI ABV UP PARAM F/U: HCPCS | Performed by: OBSTETRICS & GYNECOLOGY

## 2021-03-03 PROCEDURE — 99212 OFFICE O/P EST SF 10 MIN: CPT | Performed by: OBSTETRICS & GYNECOLOGY

## 2021-03-03 PROCEDURE — 1036F TOBACCO NON-USER: CPT | Performed by: OBSTETRICS & GYNECOLOGY

## 2021-03-03 PROCEDURE — 81003 URINALYSIS AUTO W/O SCOPE: CPT | Performed by: OBSTETRICS & GYNECOLOGY

## 2021-03-03 PROCEDURE — G8427 DOCREV CUR MEDS BY ELIG CLIN: HCPCS | Performed by: OBSTETRICS & GYNECOLOGY

## 2021-03-03 PROCEDURE — G8484 FLU IMMUNIZE NO ADMIN: HCPCS | Performed by: OBSTETRICS & GYNECOLOGY

## 2021-03-03 RX ORDER — TEMAZEPAM 30 MG/1
CAPSULE ORAL
Status: ON HOLD | COMMUNITY
Start: 2020-12-10 | End: 2021-09-29 | Stop reason: HOSPADM

## 2021-03-03 RX ORDER — GABAPENTIN 600 MG/1
600 TABLET ORAL 2 TIMES DAILY PRN
COMMUNITY
Start: 2021-03-01

## 2021-03-03 RX ORDER — TRAMADOL HYDROCHLORIDE 50 MG/1
TABLET ORAL
COMMUNITY
Start: 2021-02-03 | End: 2021-09-24 | Stop reason: SDUPTHER

## 2021-03-03 RX ORDER — ATENOLOL 25 MG/1
TABLET ORAL
COMMUNITY
Start: 2021-02-05 | End: 2022-01-13

## 2021-03-03 RX ORDER — POTASSIUM CHLORIDE 750 MG/1
10 TABLET, FILM COATED, EXTENDED RELEASE ORAL DAILY
Status: ON HOLD | COMMUNITY
Start: 2021-02-26 | End: 2021-09-29 | Stop reason: HOSPADM

## 2021-03-03 ASSESSMENT — PATIENT HEALTH QUESTIONNAIRE - PHQ9
SUM OF ALL RESPONSES TO PHQ9 QUESTIONS 1 & 2: 0
1. LITTLE INTEREST OR PLEASURE IN DOING THINGS: 0
SUM OF ALL RESPONSES TO PHQ QUESTIONS 1-9: 0
SUM OF ALL RESPONSES TO PHQ QUESTIONS 1-9: 0

## 2021-03-03 ASSESSMENT — ENCOUNTER SYMPTOMS
ALLERGIC/IMMUNOLOGIC NEGATIVE: 1
RECTAL PAIN: 0
ANAL BLEEDING: 0
ABDOMINAL DISTENTION: 0
BLOOD IN STOOL: 0
CONSTIPATION: 0
DIARRHEA: 0
EYES NEGATIVE: 1
NAUSEA: 0
RESPIRATORY NEGATIVE: 1
ABDOMINAL PAIN: 0
VOMITING: 0

## 2021-03-03 NOTE — PROGRESS NOTES
Patient here c/o urinary frequency. Denies dysuria or urgency. Has planned hysterectomy end of month with Dr Jackelyn Vinson ( see notes ). Urine dip today negative. Will send for cx as has pre-op soon. All questions answered. Pt was seen with total face to face time of 10 minutes with more than 50% of the visit being counseling and education regarding encounter dx of frequency. See discussion /counseling details as stated above. Vitals:  /70   Ht 5' (1.524 m)   Wt 264 lb (119.7 kg)   LMP 01/31/2021   BMI 51.56 kg/m²   Past Medical History:   Diagnosis Date    Abnormal Pap smear of cervix     Breast disorder     Fibroid      Past Surgical History:   Procedure Laterality Date    FOOT SURGERY      cyst on right foot as a child    UTERINE FIBROID EMBOLIZATION  03/11/2020    Dr Tracie Carbajal [phone]     Allergies:  Metronidazole and Percocet [oxycodone-acetaminophen]  Current Outpatient Medications   Medication Sig Dispense Refill    atenolol (TENORMIN) 25 MG tablet TAKE 1 TABLET BY MOUTH DAILY      gabapentin (NEURONTIN) 600 MG tablet       potassium chloride (KLOR-CON) 10 MEQ extended release tablet       ibuprofen (IBU) 800 MG tablet Take 1 tablet by mouth every 8 hours as needed for Pain 20 tablet 0    amLODIPine (NORVASC) 5 MG tablet Take 5 mg by mouth daily      temazepam (RESTORIL) 30 MG capsule TAKE ONE CAPSULE BY MOUTH EVERY NIGHT AT BEDTIME AS NEEDED FOR SLEEP      traMADol (ULTRAM) 50 MG tablet TAKE 1 TABLET BY MOUTH EVERY 12 HOURS AS NEEDED FOR PAIN      omeprazole (PRILOSEC) 20 MG delayed release capsule Take 20 mg by mouth       No current facility-administered medications for this visit.       Social History     Socioeconomic History    Marital status: Single     Spouse name: Not on file    Number of children: Not on file    Years of education: Not on file    Highest education level: Not on file   Occupational History    Not on file   Social Needs  Financial resource strain: Not on file   [employer]ath[employer] insecurity     Worry: Not on file     Inability: Not on file   [employer] needs     Medical: Not on file     Non-medical: Not on file   Tobacco Use    Smoking status: Former Smoker    Smokeless tobacco: Former User   Substance and Sexual Activity    Alcohol use: Yes     Comment: On \"occasion liquor\"    Drug use: No    Sexual activity: Yes     Partners: Male     Comment: Condoms   Lifestyle    Physical activity     Days per week: Not on file     Minutes per session: Not on file    Stress: Not on file   Relationships    Social connections     Talks on phone: Not on file     Gets together: Not on file     Attends Denominational service: Not on file     Active member of club or organization: Not on file     Attends meetings of clubs or organizations: Not on file     Relationship status: Not on file    Intimate partner violence     Fear of current or ex partner: Not on file     Emotionally abused: Not on file     Physically abused: Not on file     Forced sexual activity: Not on file   Other Topics Concern    Not on file   Social History Narrative    Not on file        Family History   Problem Relation Age of Onset    Diabetes Paternal Grandmother     Diabetes Father     Diabetes Paternal Aunt     Diabetes Paternal Uncle     Breast Cancer Neg Hx     Cancer Neg Hx     Colon Cancer Neg Hx     Eclampsia Neg Hx     Ovarian Cancer Neg Hx     Hypertension Neg Hx      Labor Neg Hx     Spont Abortions Neg Hx     Stroke Neg Hx        Review of Systems   Constitutional: Negative. Negative for activity change, appetite change, chills, diaphoresis, fatigue, fever and unexpected weight change. HENT: Negative. Eyes: Negative. Respiratory: Negative. Cardiovascular: Negative. Gastrointestinal: Negative for abdominal distention, abdominal pain, anal bleeding, blood in stool, constipation, diarrhea, nausea, rectal pain and vomiting. Endocrine: Negative. Genitourinary: Positive for frequency. Negative for decreased urine volume, difficulty urinating, dyspareunia, dysuria, enuresis, flank pain, genital sores, hematuria, menstrual problem, pelvic pain, urgency, vaginal bleeding, vaginal discharge and vaginal pain. Musculoskeletal: Negative. Skin: Negative. Allergic/Immunologic: Negative. Neurological: Negative. Hematological: Negative. Psychiatric/Behavioral: Negative. Objective:     Physical Exam  Constitutional:       General: She is not in acute distress. Appearance: She is well-developed. She is not diaphoretic. HENT:      Head: Normocephalic and atraumatic. Eyes:      Conjunctiva/sclera: Conjunctivae normal.   Neck:      Musculoskeletal: Normal range of motion and neck supple. Cardiovascular:      Rate and Rhythm: Normal rate and regular rhythm. Pulmonary:      Effort: Pulmonary effort is normal. No respiratory distress. Musculoskeletal: Normal range of motion. General: No tenderness or deformity. Skin:     General: Skin is warm and dry. Coloration: Skin is not pale. Neurological:      Mental Status: She is alert and oriented to person, place, and time. Motor: No abnormal muscle tone. Coordination: Coordination normal.   Psychiatric:         Behavior: Behavior normal.         Thought Content: Thought content normal.         Judgment: Judgment normal.         Assessment:          Diagnosis Orders   1. Pap smear, as part of routine gynecological examination     2. Screening for human papillomavirus     3. Screening mammogram, encounter for     4. Frequent urination  POCT Urinalysis No Micro (Auto)        Plan:      Medications placedthis encounter:  No orders of the defined types were placed in this encounter.         Orders placedthis encounter:  Orders Placed This Encounter   Procedures    POCT Urinalysis No Micro (Auto)         Follow up:  Return for Annual.      ] The patient was asked if she would like a chaperone present for her intimate exam. She  Declines the chaperone.  Derek An

## 2021-03-03 NOTE — TELEPHONE ENCOUNTER
Pt called to cancel surgery. She would like to reschedule to 5-13-21. Will mail pt new letter and she will also get notified via my chart.       This is the 3rd reschedule of the procedure

## 2021-04-01 ENCOUNTER — ANESTHESIA EVENT (OUTPATIENT)
Dept: OPERATING ROOM | Age: 41
End: 2021-04-01
Payer: MEDICARE

## 2021-05-05 ENCOUNTER — TELEPHONE (OUTPATIENT)
Dept: OBGYN CLINIC | Age: 41
End: 2021-05-05

## 2021-05-08 ENCOUNTER — HOSPITAL ENCOUNTER (EMERGENCY)
Age: 41
Discharge: HOME | End: 2021-05-08
Attending: EMERGENCY MEDICINE
Payer: MEDICARE

## 2021-05-08 ENCOUNTER — APPOINTMENT (OUTPATIENT)
Dept: GENERAL RADIOLOGY | Age: 41
End: 2021-05-08
Payer: MEDICARE

## 2021-05-08 VITALS
SYSTOLIC BLOOD PRESSURE: 154 MMHG | RESPIRATION RATE: 18 BRPM | OXYGEN SATURATION: 98 % | HEART RATE: 84 BPM | BODY MASS INDEX: 51.16 KG/M2 | TEMPERATURE: 97.7 F | HEIGHT: 61 IN | WEIGHT: 271 LBS | DIASTOLIC BLOOD PRESSURE: 87 MMHG

## 2021-05-08 DIAGNOSIS — R11.2 NAUSEA VOMITING AND DIARRHEA: Primary | ICD-10-CM

## 2021-05-08 DIAGNOSIS — R19.7 NAUSEA VOMITING AND DIARRHEA: Primary | ICD-10-CM

## 2021-05-08 DIAGNOSIS — R10.9 FLANK PAIN: ICD-10-CM

## 2021-05-08 LAB
ALBUMIN SERPL-MCNC: 3.8 G/DL (ref 3.5–4.6)
ALP BLD-CCNC: 70 U/L (ref 40–130)
ALT SERPL-CCNC: 33 U/L (ref 0–33)
ANION GAP SERPL CALCULATED.3IONS-SCNC: 12 MEQ/L (ref 9–15)
AST SERPL-CCNC: 38 U/L (ref 0–35)
BASOPHILS ABSOLUTE: 0.1 K/UL (ref 0–0.2)
BASOPHILS RELATIVE PERCENT: 0.9 %
BILIRUB SERPL-MCNC: 0.3 MG/DL (ref 0.2–0.7)
BILIRUBIN URINE: NEGATIVE
BLOOD, URINE: NEGATIVE
BUN BLDV-MCNC: 8 MG/DL (ref 6–20)
CALCIUM SERPL-MCNC: 9.3 MG/DL (ref 8.5–9.9)
CHLORIDE BLD-SCNC: 103 MEQ/L (ref 95–107)
CHP ED QC CHECK: YES
CLARITY: CLEAR
CO2: 22 MEQ/L (ref 20–31)
COLOR: YELLOW
CREAT SERPL-MCNC: 0.59 MG/DL (ref 0.5–0.9)
EOSINOPHILS ABSOLUTE: 0.1 K/UL (ref 0–0.7)
EOSINOPHILS RELATIVE PERCENT: 1.9 %
GFR AFRICAN AMERICAN: >60
GFR NON-AFRICAN AMERICAN: >60
GLOBULIN: 3.7 G/DL (ref 2.3–3.5)
GLUCOSE BLD-MCNC: 111 MG/DL (ref 70–99)
GLUCOSE URINE: 100 MG/DL
HCT VFR BLD CALC: 38.3 % (ref 37–47)
HEMOGLOBIN: 12.9 G/DL (ref 12–16)
KETONES, URINE: NEGATIVE MG/DL
LEUKOCYTE ESTERASE, URINE: NEGATIVE
LIPASE: 21 U/L (ref 12–95)
LYMPHOCYTES ABSOLUTE: 1.9 K/UL (ref 1–4.8)
LYMPHOCYTES RELATIVE PERCENT: 33.5 %
MAGNESIUM: 1.7 MG/DL (ref 1.7–2.4)
MCH RBC QN AUTO: 29 PG (ref 27–31.3)
MCHC RBC AUTO-ENTMCNC: 33.6 % (ref 33–37)
MCV RBC AUTO: 86.3 FL (ref 82–100)
MONOCYTES ABSOLUTE: 0.5 K/UL (ref 0.2–0.8)
MONOCYTES RELATIVE PERCENT: 8.8 %
NEUTROPHILS ABSOLUTE: 3.1 K/UL (ref 1.4–6.5)
NEUTROPHILS RELATIVE PERCENT: 54.9 %
NITRITE, URINE: NEGATIVE
PDW BLD-RTO: 14.5 % (ref 11.5–14.5)
PH UA: 5.5 (ref 5–9)
PLATELET # BLD: 259 K/UL (ref 130–400)
POTASSIUM SERPL-SCNC: 4.1 MEQ/L (ref 3.4–4.9)
PREGNANCY TEST URINE, POC: NEGATIVE
PROTEIN UA: NEGATIVE MG/DL
RBC # BLD: 4.43 M/UL (ref 4.2–5.4)
SODIUM BLD-SCNC: 137 MEQ/L (ref 135–144)
SPECIFIC GRAVITY UA: 1.02 (ref 1–1.03)
TOTAL PROTEIN: 7.5 G/DL (ref 6.3–8)
UROBILINOGEN, URINE: 0.2 E.U./DL
WBC # BLD: 5.6 K/UL (ref 4.8–10.8)

## 2021-05-08 PROCEDURE — 36415 COLL VENOUS BLD VENIPUNCTURE: CPT

## 2021-05-08 PROCEDURE — 83735 ASSAY OF MAGNESIUM: CPT

## 2021-05-08 PROCEDURE — 6360000002 HC RX W HCPCS: Performed by: EMERGENCY MEDICINE

## 2021-05-08 PROCEDURE — 2580000003 HC RX 258: Performed by: EMERGENCY MEDICINE

## 2021-05-08 PROCEDURE — 85025 COMPLETE CBC W/AUTO DIFF WBC: CPT

## 2021-05-08 PROCEDURE — 6370000000 HC RX 637 (ALT 250 FOR IP): Performed by: EMERGENCY MEDICINE

## 2021-05-08 PROCEDURE — 96375 TX/PRO/DX INJ NEW DRUG ADDON: CPT

## 2021-05-08 PROCEDURE — 80053 COMPREHEN METABOLIC PANEL: CPT

## 2021-05-08 PROCEDURE — 96374 THER/PROPH/DIAG INJ IV PUSH: CPT

## 2021-05-08 PROCEDURE — 74018 RADEX ABDOMEN 1 VIEW: CPT

## 2021-05-08 PROCEDURE — 96372 THER/PROPH/DIAG INJ SC/IM: CPT

## 2021-05-08 PROCEDURE — 81003 URINALYSIS AUTO W/O SCOPE: CPT

## 2021-05-08 PROCEDURE — 83690 ASSAY OF LIPASE: CPT

## 2021-05-08 PROCEDURE — 99285 EMERGENCY DEPT VISIT HI MDM: CPT

## 2021-05-08 RX ORDER — CIPROFLOXACIN 500 MG/1
500 TABLET, FILM COATED ORAL 2 TIMES DAILY
Qty: 14 TABLET | Refills: 0 | Status: SHIPPED | OUTPATIENT
Start: 2021-05-08 | End: 2021-05-15

## 2021-05-08 RX ORDER — METOCLOPRAMIDE HYDROCHLORIDE 5 MG/ML
10 INJECTION INTRAMUSCULAR; INTRAVENOUS ONCE
Status: COMPLETED | OUTPATIENT
Start: 2021-05-08 | End: 2021-05-08

## 2021-05-08 RX ORDER — PROMETHAZINE HYDROCHLORIDE 25 MG/ML
25 INJECTION, SOLUTION INTRAMUSCULAR; INTRAVENOUS ONCE
Status: COMPLETED | OUTPATIENT
Start: 2021-05-08 | End: 2021-05-08

## 2021-05-08 RX ORDER — ACETAMINOPHEN 500 MG
1000 TABLET ORAL ONCE
Status: COMPLETED | OUTPATIENT
Start: 2021-05-08 | End: 2021-05-08

## 2021-05-08 RX ORDER — KETOROLAC TROMETHAMINE 30 MG/ML
30 INJECTION, SOLUTION INTRAMUSCULAR; INTRAVENOUS ONCE
Status: COMPLETED | OUTPATIENT
Start: 2021-05-08 | End: 2021-05-08

## 2021-05-08 RX ORDER — LOPERAMIDE HYDROCHLORIDE 2 MG/1
2 CAPSULE ORAL ONCE
Status: COMPLETED | OUTPATIENT
Start: 2021-05-08 | End: 2021-05-08

## 2021-05-08 RX ORDER — LOPERAMIDE HYDROCHLORIDE 2 MG/1
2 CAPSULE ORAL 4 TIMES DAILY PRN
Qty: 20 CAPSULE | Refills: 0 | Status: SHIPPED | OUTPATIENT
Start: 2021-05-08 | End: 2021-05-18

## 2021-05-08 RX ORDER — DIPHENHYDRAMINE HYDROCHLORIDE 50 MG/ML
25 INJECTION INTRAMUSCULAR; INTRAVENOUS ONCE
Status: COMPLETED | OUTPATIENT
Start: 2021-05-08 | End: 2021-05-08

## 2021-05-08 RX ORDER — ONDANSETRON 4 MG/1
4 TABLET, ORALLY DISINTEGRATING ORAL 3 TIMES DAILY PRN
Qty: 21 TABLET | Refills: 0 | Status: SHIPPED | OUTPATIENT
Start: 2021-05-08 | End: 2021-08-19

## 2021-05-08 RX ORDER — 0.9 % SODIUM CHLORIDE 0.9 %
1000 INTRAVENOUS SOLUTION INTRAVENOUS ONCE
Status: COMPLETED | OUTPATIENT
Start: 2021-05-08 | End: 2021-05-08

## 2021-05-08 RX ADMIN — METOCLOPRAMIDE HYDROCHLORIDE 10 MG: 5 INJECTION INTRAMUSCULAR; INTRAVENOUS at 09:51

## 2021-05-08 RX ADMIN — KETOROLAC TROMETHAMINE 30 MG: 30 INJECTION, SOLUTION INTRAMUSCULAR; INTRAVENOUS at 09:50

## 2021-05-08 RX ADMIN — PROMETHAZINE HYDROCHLORIDE 25 MG: 25 INJECTION, SOLUTION INTRAMUSCULAR; INTRAVENOUS at 09:15

## 2021-05-08 RX ADMIN — SODIUM CHLORIDE 1000 ML: 9 INJECTION, SOLUTION INTRAVENOUS at 09:50

## 2021-05-08 RX ADMIN — ACETAMINOPHEN 1000 MG: 500 TABLET ORAL at 09:15

## 2021-05-08 RX ADMIN — LOPERAMIDE HYDROCHLORIDE 2 MG: 2 CAPSULE ORAL at 09:14

## 2021-05-08 RX ADMIN — DIPHENHYDRAMINE HYDROCHLORIDE 25 MG: 50 INJECTION INTRAMUSCULAR; INTRAVENOUS at 09:51

## 2021-05-08 ASSESSMENT — ENCOUNTER SYMPTOMS
SHORTNESS OF BREATH: 0
ABDOMINAL PAIN: 0
VOMITING: 1
BACK PAIN: 0
NAUSEA: 1
COUGH: 0
DIARRHEA: 1
SORE THROAT: 0

## 2021-05-08 ASSESSMENT — PAIN SCALES - GENERAL
PAINLEVEL_OUTOF10: 8
PAINLEVEL_OUTOF10: 5

## 2021-05-08 ASSESSMENT — PAIN DESCRIPTION - DESCRIPTORS
DESCRIPTORS: SHARP
DESCRIPTORS: SHARP

## 2021-05-08 ASSESSMENT — PAIN DESCRIPTION - PAIN TYPE
TYPE: ACUTE PAIN

## 2021-05-08 ASSESSMENT — PAIN DESCRIPTION - FREQUENCY
FREQUENCY: INTERMITTENT
FREQUENCY: CONTINUOUS

## 2021-05-08 ASSESSMENT — PAIN DESCRIPTION - ORIENTATION: ORIENTATION: RIGHT

## 2021-05-08 ASSESSMENT — PAIN DESCRIPTION - PROGRESSION: CLINICAL_PROGRESSION: GRADUALLY IMPROVING

## 2021-05-08 NOTE — ED NOTES
Discharge instructions reviewed with patient including next medications dose due. Verbalized understanding. Denies additional questions. Ambulated off unit with a steady gait with no distress noted. Shaan Griggs RN  05/08/21 4609

## 2021-05-08 NOTE — ED NOTES
Attempt x2 to obtain IV without success. Additional RN attempting at this time. [facility]  05/08/21 3283

## 2021-05-08 NOTE — ED TRIAGE NOTES
Pt c/o N/V/D and right side ABD pain since yesterday, Pt is A&OX3, calm, ambulatory, afebrile, breathes are equal and unlabored.

## 2021-05-08 NOTE — ED NOTES
Dr Dong Loya at [address] [facility].  Mike Albright, [place]  05/08/21 7139

## 2021-05-08 NOTE — ED NOTES
Cisco Mccartney RN with two attempts at IV, unsuccessful     Adiel Needs.  Chuck [facility]  05/08/21 7929

## 2021-05-08 NOTE — ED PROVIDER NOTES
[address] [facility] ED  eMERGENCYdEPARTMENT eNCOUnter      Pt Name: Rory Johnson  MRN: [id number]  [place] 1980  Date of evaluation: 5/8/2021  Vanita Sanchez MD    CHIEF COMPLAINT           HPI  Rory Johnson is a 45 y.o. female per chart review has a h/o fibroids presents to the ED with n/v/d, flank pain. Pt notes 5-6 episodes of non bloody diarrhea since last night. +N/v.  Pt also notes mild, intermittent, aching, R flank pain. Pt denies fever, cp, sob, dysuria. ROS  Review of Systems   Constitutional: Negative for activity change, chills and fever. HENT: Negative for ear pain and sore throat. Eyes: Negative for visual disturbance. Respiratory: Negative for cough and shortness of breath. Cardiovascular: Negative for chest pain, palpitations and leg swelling. Gastrointestinal: Positive for diarrhea, nausea and vomiting. Negative for abdominal pain. Genitourinary: Positive for flank pain. Negative for dysuria. Musculoskeletal: Negative for back pain. Skin: Negative for rash. Neurological: Negative for dizziness and weakness. Except as noted above the remainder of the review of systems was reviewed and negative.        PAST MEDICAL HISTORY     Past Medical History:   Diagnosis Date    Abnormal Pap smear of cervix     Breast disorder     Fibroid          SURGICAL HISTORY       Past Surgical History:   Procedure Laterality Date    FOOT SURGERY      cyst on right foot as a child    UTERINE FIBROID EMBOLIZATION  03/11/2020    Dr Joan Cohen [phone]         CURRENTMEDICATIONS       Previous Medications    AMLODIPINE (NORVASC) 5 MG TABLET    Take 5 mg by mouth daily    ATENOLOL (TENORMIN) 25 MG TABLET    TAKE 1 TABLET BY MOUTH DAILY    GABAPENTIN (NEURONTIN) 600 MG TABLET        IBUPROFEN (IBU) 800 MG TABLET    Take 1 tablet by mouth every 8 hours as needed for Pain    OMEPRAZOLE (PRILOSEC) 20 MG DELAYED RELEASE CAPSULE    Take 20 mg by mouth POTASSIUM CHLORIDE (KLOR-CON) 10 MEQ EXTENDED RELEASE TABLET        TEMAZEPAM (RESTORIL) 30 MG CAPSULE    TAKE ONE CAPSULE BY MOUTH EVERY NIGHT AT BEDTIME AS NEEDED FOR SLEEP    TRAMADOL (ULTRAM) 50 MG TABLET    TAKE 1 TABLET BY MOUTH EVERY 12 HOURS AS NEEDED FOR PAIN       ALLERGIES     Metronidazole and Percocet [oxycodone-acetaminophen]    FAMILY HISTORY       Family History   Problem Relation Age of Onset    Diabetes Paternal Grandmother     Diabetes Father     Diabetes Paternal Aunt     Diabetes Paternal Uncle     Breast Cancer Neg Hx     Cancer Neg Hx     Colon Cancer Neg Hx     Eclampsia Neg Hx     Ovarian Cancer Neg Hx     Hypertension Neg Hx      Labor Neg Hx     Spont Abortions Neg Hx     Stroke Neg Hx           SOCIAL HISTORY       Social History     Socioeconomic History    Marital status: Single     Spouse name: None    Number of children: None    Years of education: None    Highest education level: None   Occupational History    None   Social Needs    Financial resource strain: None    Food insecurity     Worry: None     Inability: None    Transportation needs     Medical: None     Non-medical: None   Tobacco Use    Smoking status: Former Smoker    Smokeless tobacco: Former User   Substance and Sexual Activity    Alcohol use: Yes     Comment:  On \"occasion liquor\"    Drug use: No    Sexual activity: Yes     Partners: Male     Comment: Condoms   Lifestyle    Physical activity     Days per week: None     Minutes per session: None    Stress: None   Relationships    Social connections     Talks on phone: None     Gets together: None     Attends Yazidi service: None     Active member of club or organization: None     Attends meetings of clubs or organizations: None     Relationship status: None    Intimate partner violence     Fear of current or ex partner: None     Emotionally abused: None     Physically abused: None     Forced sexual activity: None   Other Topics DISPOSITION/PLAN   DISPOSITION Decision To Discharge 05/08/2021 10:35:13 AM        DISCHARGE MEDICATIONS:  [unfilled]         Tulio Paredes MD(electronically signed)  Attending Emergency Physician            Tulio Paredes MD  05/08/21 1037

## 2021-05-13 ENCOUNTER — ANESTHESIA (OUTPATIENT)
Dept: OPERATING ROOM | Age: 41
End: 2021-05-13
Payer: MEDICARE

## 2021-08-10 ENCOUNTER — HOSPITAL ENCOUNTER (EMERGENCY)
Age: 41
Discharge: HOME | End: 2021-08-10
Attending: EMERGENCY MEDICINE
Payer: MEDICARE

## 2021-08-10 VITALS
HEART RATE: 88 BPM | WEIGHT: 215 LBS | RESPIRATION RATE: 19 BRPM | SYSTOLIC BLOOD PRESSURE: 167 MMHG | TEMPERATURE: 98.2 F | HEIGHT: 59 IN | OXYGEN SATURATION: 97 % | DIASTOLIC BLOOD PRESSURE: 102 MMHG | BODY MASS INDEX: 43.34 KG/M2

## 2021-08-10 DIAGNOSIS — R19.7 DIARRHEA OF PRESUMED INFECTIOUS ORIGIN: Primary | ICD-10-CM

## 2021-08-10 LAB
ALBUMIN SERPL-MCNC: 4 G/DL (ref 3.5–4.6)
ALP BLD-CCNC: 75 U/L (ref 40–130)
ALT SERPL-CCNC: 52 U/L (ref 0–33)
AMYLASE: 38 U/L (ref 22–93)
ANION GAP SERPL CALCULATED.3IONS-SCNC: 11 MEQ/L (ref 9–15)
AST SERPL-CCNC: 56 U/L (ref 0–35)
BASOPHILS ABSOLUTE: 0 K/UL (ref 0–0.2)
BASOPHILS RELATIVE PERCENT: 0.5 %
BILIRUB SERPL-MCNC: 0.4 MG/DL (ref 0.2–0.7)
BUN BLDV-MCNC: 9 MG/DL (ref 6–20)
CALCIUM SERPL-MCNC: 9.1 MG/DL (ref 8.5–9.9)
CHLORIDE BLD-SCNC: 102 MEQ/L (ref 95–107)
CO2: 25 MEQ/L (ref 20–31)
CREAT SERPL-MCNC: 0.59 MG/DL (ref 0.5–0.9)
EOSINOPHILS ABSOLUTE: 0.1 K/UL (ref 0–0.7)
EOSINOPHILS RELATIVE PERCENT: 1.5 %
GFR AFRICAN AMERICAN: >60
GFR NON-AFRICAN AMERICAN: >60
GLOBULIN: 3.4 G/DL (ref 2.3–3.5)
GLUCOSE BLD-MCNC: 117 MG/DL (ref 70–99)
HCG, URINE, POC: NEGATIVE
HCT VFR BLD CALC: 39.1 % (ref 37–47)
HEMOGLOBIN: 12.9 G/DL (ref 12–16)
LIPASE: 22 U/L (ref 12–95)
LYMPHOCYTES ABSOLUTE: 1.7 K/UL (ref 1–4.8)
LYMPHOCYTES RELATIVE PERCENT: 35 %
Lab: NORMAL
MCH RBC QN AUTO: 28.6 PG (ref 27–31.3)
MCHC RBC AUTO-ENTMCNC: 33 % (ref 33–37)
MCV RBC AUTO: 86.5 FL (ref 82–100)
MONOCYTES ABSOLUTE: 0.3 K/UL (ref 0.2–0.8)
MONOCYTES RELATIVE PERCENT: 6.6 %
NEGATIVE QC PASS/FAIL: NORMAL
NEUTROPHILS ABSOLUTE: 2.7 K/UL (ref 1.4–6.5)
NEUTROPHILS RELATIVE PERCENT: 56.4 %
PDW BLD-RTO: 15.1 % (ref 11.5–14.5)
PLATELET # BLD: 239 K/UL (ref 130–400)
POSITIVE QC PASS/FAIL: NORMAL
POTASSIUM SERPL-SCNC: 3.7 MEQ/L (ref 3.4–4.9)
RBC # BLD: 4.52 M/UL (ref 4.2–5.4)
SODIUM BLD-SCNC: 138 MEQ/L (ref 135–144)
TOTAL PROTEIN: 7.4 G/DL (ref 6.3–8)
WBC # BLD: 4.9 K/UL (ref 4.8–10.8)

## 2021-08-10 PROCEDURE — 83690 ASSAY OF LIPASE: CPT

## 2021-08-10 PROCEDURE — 80053 COMPREHEN METABOLIC PANEL: CPT

## 2021-08-10 PROCEDURE — 85025 COMPLETE CBC W/AUTO DIFF WBC: CPT

## 2021-08-10 PROCEDURE — 99284 EMERGENCY DEPT VISIT MOD MDM: CPT

## 2021-08-10 PROCEDURE — 82150 ASSAY OF AMYLASE: CPT

## 2021-08-10 PROCEDURE — 36415 COLL VENOUS BLD VENIPUNCTURE: CPT

## 2021-08-10 ASSESSMENT — ENCOUNTER SYMPTOMS
VOMITING: 0
WHEEZING: 0
ABDOMINAL PAIN: 0
CONSTIPATION: 0
CHEST TIGHTNESS: 0
SORE THROAT: 0
DIARRHEA: 1
COUGH: 0
NAUSEA: 0
SHORTNESS OF BREATH: 0
SINUS PRESSURE: 0
RHINORRHEA: 0

## 2021-08-10 ASSESSMENT — PAIN DESCRIPTION - FREQUENCY: FREQUENCY: CONTINUOUS

## 2021-08-10 ASSESSMENT — PAIN SCALES - GENERAL: PAINLEVEL_OUTOF10: 6

## 2021-08-10 ASSESSMENT — PAIN DESCRIPTION - PAIN TYPE: TYPE: ACUTE PAIN

## 2021-08-10 ASSESSMENT — PAIN DESCRIPTION - DESCRIPTORS: DESCRIPTORS: ACHING

## 2021-08-10 ASSESSMENT — PAIN DESCRIPTION - ORIENTATION: ORIENTATION: LOWER

## 2021-08-10 ASSESSMENT — PAIN DESCRIPTION - LOCATION: LOCATION: ABDOMEN

## 2021-08-10 NOTE — ED PROVIDER NOTES
[facility] ED  eMERGENCY dEPARTMENT eNCOUnter      Pt Name: Mary Jane Verdugo  MRN: [id number]  [place] 1980  Date of evaluation: 8/10/2021  Provider: [employer], [place]        HISTORY OF PRESENT ILLNESS    Mary Jane Verdugo is a 37 y.o. female per chart review has a h/o obesity, uterine fibroid (with planned hysterectomy upcoming 8/26/2021) presents to the ED with c/o 2 days of sudden onset, gradually worsening diarrhea without associated chest pain/shortness of breath or abdominal pain. Denies known sick contacts, however works as a [employer] full time. Has not been taking much solid food in, however notes she has been able to maintain intake of fluids. Was advised by boss to present to the ED for examination per pt report. REVIEW OF SYSTEMS       Review of Systems   Constitutional: Positive for appetite change. Negative for activity change, chills, diaphoresis, fatigue and fever. HENT: Negative for congestion, postnasal drip, rhinorrhea, sinus pressure and sore throat. Respiratory: Negative for cough, chest tightness, shortness of breath and wheezing. Cardiovascular: Negative for chest pain, palpitations and leg swelling. Gastrointestinal: Positive for diarrhea. Negative for abdominal pain, constipation, nausea and vomiting. Genitourinary: Negative for decreased urine volume, dysuria, flank pain, frequency, hematuria, pelvic pain and urgency. Musculoskeletal: Negative for arthralgias and myalgias. Neurological: Negative for dizziness, syncope, weakness, light-headedness, numbness and headaches. All other systems reviewed and are negative. Except as noted above the remainder of the review of systems was reviewed and negative.        PAST MEDICAL HISTORY     Past Medical History:   Diagnosis Date    Abnormal Pap smear of cervix     Breast disorder     Fibroid          SURGICAL HISTORY       Past Surgical History:   Procedure Laterality Date    FOOT SURGERY      cyst Social History Narrative    None     Social Determinants of Health     Financial Resource Strain:     Difficulty of Paying Living Expenses:    Food Insecurity:     Worried About Running Out of Food in the Last Year:     [address] in the Last Year:    Transportation Needs:     Lack of Transportation (Medical):  Lack of Transportation (Non-Medical):    Physical Activity:     Days of Exercise per Week:     Minutes of Exercise per Session:    Stress:     Feeling of Stress :    Social Connections:     Frequency of Communication with Friends and Family:     Frequency of Social Gatherings with Friends and Family:     Attends Bahai Services:     Active Member of Clubs or Organizations:     Attends Club or Organization Meetings:     Marital Status:    Intimate Partner Violence:     Fear of Current or Ex-Partner:     Emotionally Abused:     Physically Abused:     Sexually Abused:          PHYSICAL EXAM        ED Triage Vitals [08/10/21 1050]   BP Temp Temp Source Pulse Resp SpO2 Height Weight   (!) 167/102 98.2 °F (36.8 °C) Temporal 88 19 97 % 4' 11\" (1.499 m) 215 lb (97.5 kg)       Physical Exam  Vitals and nursing note reviewed. Constitutional:       General: She is not in acute distress. Appearance: Normal appearance. She is obese. She is not ill-appearing, toxic-appearing or diaphoretic. HENT:      Head: Normocephalic and atraumatic. Right Ear: Tympanic membrane normal.      Left Ear: Tympanic membrane normal.      Nose: Nose normal. No congestion. Mouth/Throat:      Mouth: Mucous membranes are moist.      Pharynx: Oropharynx is clear. No oropharyngeal exudate. Eyes:      Extraocular Movements: Extraocular movements intact. Pupils: Pupils are equal, round, and reactive to light. Cardiovascular:      Rate and Rhythm: Normal rate and regular rhythm. Pulses: Normal pulses. Heart sounds: Normal heart sounds. No murmur heard.      Pulmonary:      Effort: Pulmonary effort is normal. No respiratory distress. Breath sounds: Normal breath sounds. No wheezing. Abdominal:      General: Abdomen is protuberant. Bowel sounds are normal. There is no distension. Palpations: Abdomen is soft. Tenderness: There is no abdominal tenderness. There is no right CVA tenderness, left CVA tenderness, guarding or rebound. Musculoskeletal:         General: No swelling, tenderness, deformity or signs of injury. Normal range of motion. Cervical back: Normal range of motion and neck supple. No rigidity. No muscular tenderness. Lymphadenopathy:      Cervical: No cervical adenopathy. Skin:     General: Skin is warm and dry. Capillary Refill: Capillary refill takes less than 2 seconds. Neurological:      General: No focal deficit present. Mental Status: She is alert and oriented to person, place, and time. Mental status is at baseline. Motor: No weakness. Gait: Gait normal.   Psychiatric:         Mood and Affect: Mood normal. Mood is not anxious. Behavior: Behavior normal.         Thought Content: Thought content normal.         Judgment: Judgment normal.           LABS:  Labs Reviewed   CBC WITH AUTO DIFFERENTIAL - Abnormal; Notable for the following components:       Result Value    RDW 15.1 (*)     All other components within normal limits   COMPREHENSIVE METABOLIC PANEL - Abnormal; Notable for the following components:    Glucose 117 (*)     ALT 52 (*)     AST 56 (*)     All other components within normal limits   AMYLASE   LIPASE   POC PREGNANCY UR-QUAL         MDM:   Vitals:    Vitals:    08/10/21 1050   BP: (!) 167/102   Pulse: 88   Resp: 19   Temp: 98.2 °F (36.8 °C)   TempSrc: Temporal   SpO2: 97%   Weight: 215 lb (97.5 kg)   Height: 4' 11\" (1.499 m)       37year old female presents to the ED with c/o diarrhea. Triage records were reviewed. Medical records were reviewed.    Nursing notes were reviewed and incorporated. Patient afebrile, hemodynamically stable, non-toxic in appearance upon initial evaluation. Patient offered IV NS bolus for rehydration, however refused, citing preference of PO intake, which was given, and which patient tolerated well. Labs/Imaging:   Labs reviewed, remarkable for mild increase in LFTs without increase in bilirubin. On re-examination patient remains without abdominal tenderness. Suspect viral diarrhea in setting of possible sick exposures. Supportive care instructions given, with return precautions. Patient discharged with work note as requested. CRITICAL CARE TIME   Total CriticalCare time was 0 minutes, excluding separately reportable procedures. There was a high probability of clinically significant/life threatening deterioration in the patient's condition which required my urgent intervention. PROCEDURES:  Unlessotherwise noted below, none      Procedures      FINAL IMPRESSION      1.  Diarrhea of presumed infectious origin          DISPOSITION/PLAN   DISPOSITION Decision To Discharge 08/10/2021 12:06:34 PM          JAYDON Solis (electronically signed)  Attending Emergency Physician          JAYDON Solis  08/10/21 0989

## 2021-08-10 NOTE — ED TRIAGE NOTES
Pt presents to ED from home with c/o abdominal pain. Pt states that lower abd pain started this AM and is associated with diarrhea. Pt denies n/v, fever, chills, cp, and SOB. Upon assessment, pt is A/Ox4, skin appropriate for ethnicity/w/d, resp even and unlabored, msp's intact.

## 2021-08-16 ENCOUNTER — TELEPHONE (OUTPATIENT)
Dept: OBGYN CLINIC | Age: 41
End: 2021-08-16

## 2021-08-16 NOTE — TELEPHONE ENCOUNTER
Patient states that she would like to speak with you about pushing her surgery day back to October , she states that due to her job giving her the time off or holding her position. And she said that does not have the PTO so that she can get paid while she's out. So she said that she just signed up for [employer] and it will not be able to be used until around October.

## 2021-08-19 ENCOUNTER — HOSPITAL ENCOUNTER (OUTPATIENT)
Dept: PREADMISSION TESTING | Age: 41
Discharge: HOME | End: 2021-08-23
Payer: MEDICARE

## 2021-08-19 VITALS
HEART RATE: 109 BPM | OXYGEN SATURATION: 99 % | RESPIRATION RATE: 16 BRPM | TEMPERATURE: 98.2 F | WEIGHT: 266.8 LBS | BODY MASS INDEX: 50.37 KG/M2 | HEIGHT: 61 IN | SYSTOLIC BLOOD PRESSURE: 151 MMHG | DIASTOLIC BLOOD PRESSURE: 88 MMHG

## 2021-08-19 LAB
ABO/RH: NORMAL
ANTIBODY SCREEN: NORMAL

## 2021-08-19 PROCEDURE — 93005 ELECTROCARDIOGRAM TRACING: CPT | Performed by: OBSTETRICS & GYNECOLOGY

## 2021-08-19 PROCEDURE — 86900 BLOOD TYPING SEROLOGIC ABO: CPT

## 2021-08-19 PROCEDURE — 86850 RBC ANTIBODY SCREEN: CPT

## 2021-08-19 PROCEDURE — 86901 BLOOD TYPING SEROLOGIC RH(D): CPT

## 2021-08-19 RX ORDER — MULTIVIT-MIN/IRON/FOLIC ACID/K 18-600-40
1 CAPSULE ORAL DAILY
Status: ON HOLD | COMMUNITY
End: 2021-09-29 | Stop reason: HOSPADM

## 2021-08-19 NOTE — PROGRESS NOTES
Yellow PAT instrucion sheet reviewed with patient, who verbalized understanding. Fully vaccinated card presented at appointment.

## 2021-08-22 LAB
EKG ATRIAL RATE: 69 BPM
EKG P AXIS: -16 DEGREES
EKG P-R INTERVAL: 134 MS
EKG Q-T INTERVAL: 424 MS
EKG QRS DURATION: 66 MS
EKG QTC CALCULATION (BAZETT): 454 MS
EKG R AXIS: 7 DEGREES
EKG T AXIS: 3 DEGREES
EKG VENTRICULAR RATE: 69 BPM

## 2021-08-22 PROCEDURE — 93010 ELECTROCARDIOGRAM REPORT: CPT | Performed by: INTERNAL MEDICINE

## 2021-08-25 RX ORDER — POLYETHYLENE GLYCOL 3350 17 G/17G
POWDER, FOR SOLUTION ORAL
Qty: 2 PACKET | Refills: 1 | Status: SHIPPED | OUTPATIENT
Start: 2021-08-25 | End: 2021-09-09 | Stop reason: ALTCHOICE

## 2021-08-26 ENCOUNTER — HOSPITAL ENCOUNTER (OUTPATIENT)
Age: 41
Setting detail: OUTPATIENT SURGERY
Discharge: HOME | End: 2021-08-26
Attending: OBSTETRICS & GYNECOLOGY | Admitting: OBSTETRICS & GYNECOLOGY
Payer: MEDICARE

## 2021-08-26 VITALS
OXYGEN SATURATION: 100 % | RESPIRATION RATE: 16 BRPM | DIASTOLIC BLOOD PRESSURE: 95 MMHG | BODY MASS INDEX: 50.03 KG/M2 | TEMPERATURE: 97.2 F | HEART RATE: 73 BPM | SYSTOLIC BLOOD PRESSURE: 179 MMHG | WEIGHT: 265 LBS | HEIGHT: 61 IN

## 2021-08-26 PROCEDURE — 6370000000 HC RX 637 (ALT 250 FOR IP)

## 2021-08-26 PROCEDURE — 2580000003 HC RX 258

## 2021-08-26 RX ORDER — METOCLOPRAMIDE HYDROCHLORIDE 5 MG/ML
10 INJECTION INTRAMUSCULAR; INTRAVENOUS
Status: DISCONTINUED | OUTPATIENT
Start: 2021-08-26 | End: 2021-08-26 | Stop reason: HOSPADM

## 2021-08-26 RX ORDER — HYDROCHLOROTHIAZIDE 12.5 MG/1
12.5 TABLET ORAL DAILY
Status: ON HOLD | COMMUNITY
Start: 2021-08-09 | End: 2021-09-16

## 2021-08-26 RX ORDER — FENTANYL CITRATE 50 UG/ML
50 INJECTION, SOLUTION INTRAMUSCULAR; INTRAVENOUS EVERY 10 MIN PRN
Status: DISCONTINUED | OUTPATIENT
Start: 2021-08-26 | End: 2021-08-26 | Stop reason: HOSPADM

## 2021-08-26 RX ORDER — SODIUM CHLORIDE, SODIUM LACTATE, POTASSIUM CHLORIDE, CALCIUM CHLORIDE 600; 310; 30; 20 MG/100ML; MG/100ML; MG/100ML; MG/100ML
INJECTION, SOLUTION INTRAVENOUS CONTINUOUS
Status: DISCONTINUED | OUTPATIENT
Start: 2021-08-26 | End: 2021-08-26 | Stop reason: HOSPADM

## 2021-08-26 RX ORDER — TRAZODONE HYDROCHLORIDE 50 MG/1
50 TABLET ORAL NIGHTLY
Status: ON HOLD | COMMUNITY
Start: 2021-08-09 | End: 2021-09-26

## 2021-08-26 RX ORDER — MEPERIDINE HYDROCHLORIDE 25 MG/ML
12.5 INJECTION INTRAMUSCULAR; INTRAVENOUS; SUBCUTANEOUS EVERY 5 MIN PRN
Status: DISCONTINUED | OUTPATIENT
Start: 2021-08-26 | End: 2021-08-26 | Stop reason: HOSPADM

## 2021-08-26 RX ORDER — SODIUM CHLORIDE, SODIUM LACTATE, POTASSIUM CHLORIDE, CALCIUM CHLORIDE 600; 310; 30; 20 MG/100ML; MG/100ML; MG/100ML; MG/100ML
INJECTION, SOLUTION INTRAVENOUS
Status: COMPLETED
Start: 2021-08-26 | End: 2021-08-26

## 2021-08-26 RX ORDER — ONDANSETRON 2 MG/ML
4 INJECTION INTRAMUSCULAR; INTRAVENOUS
Status: DISCONTINUED | OUTPATIENT
Start: 2021-08-26 | End: 2021-08-26 | Stop reason: HOSPADM

## 2021-08-26 RX ORDER — DIPHENHYDRAMINE HYDROCHLORIDE 50 MG/ML
12.5 INJECTION INTRAMUSCULAR; INTRAVENOUS
Status: DISCONTINUED | OUTPATIENT
Start: 2021-08-26 | End: 2021-08-26 | Stop reason: HOSPADM

## 2021-08-26 RX ADMIN — SODIUM CHLORIDE, POTASSIUM CHLORIDE, SODIUM LACTATE AND CALCIUM CHLORIDE 1000 ML: 600; 310; 30; 20 INJECTION, SOLUTION INTRAVENOUS at 10:55

## 2021-08-26 NOTE — ANESTHESIA PRE PROCEDURE
Department of Anesthesiology  Preprocedure Note       Name:  Barbra Jacinto   Age:  37 y.o.  :  1980                                          MRN:  [id number]         Date:  2021      Surgeon: Amina Lai):  Arben Alicia MD    Procedure: Procedure(s): MYOMECTOMY  POSSIBLE TLH POSSIBLE LAPAROTOMY, 3 HRS. Medications prior to admission:   Prior to Admission medications    Medication Sig Start Date End Date Taking? Authorizing Provider   polyethylene glycol (MIRALAX) 17 g PACK packet 17 gram in am /  17 gram at 12 p day before surgery. 21   Mortimer Flowers, APRN - CNM   Ascorbic Acid (VITAMIN C) 500 MG CAPS Take 1 tablet by mouth daily    Historical Provider, MD   atenolol (TENORMIN) 25 MG tablet TAKE 1 TABLET BY MOUTH DAILY 21   Historical Provider, MD   gabapentin (NEURONTIN) 600 MG tablet daily.   3/1/21   Historical Provider, MD   potassium chloride (KLOR-CON) 10 MEQ extended release tablet 10 mEq daily  21   Historical Provider, MD   temazepam (RESTORIL) 30 MG capsule TAKE ONE CAPSULE BY MOUTH EVERY NIGHT AT BEDTIME AS NEEDED FOR SLEEP 12/10/20   Historical Provider, MD   traMADol (ULTRAM) 50 MG tablet TAKE 1 TABLET BY MOUTH EVERY 12 HOURS AS NEEDED FOR PAIN 2/3/21   Historical Provider, MD   ibuprofen (IBU) 800 MG tablet Take 1 tablet by mouth every 8 hours as needed for Pain 21   MARIANGEL Zheng - CNP   amLODIPine (NORVASC) 5 MG tablet Take 5 mg by mouth daily    Historical Provider, MD   omeprazole (PRILOSEC) 20 MG delayed release capsule Take 20 mg by mouth Daily  19  Historical Provider, MD       Current medications:    Current Facility-Administered Medications   Medication Dose Route Frequency Provider Last Rate Last Admin    cefOXitin (MEFOXIN) 2000 mg in dextrose 5% 50 mL (mini-bag)  2,000 mg IntraVENous On Call to 3424 Aldo Perkins MD        meperidine (DEMEROL) injection 12.5 mg  12.5 mg IntraVENous Q5 Min PRN Madhuri Zimmerman MD       [facility] Component Value Date    WBC 4.9 08/10/2021    RBC 4.52 08/10/2021    HGB 12.9 08/10/2021    HCT 39.1 08/10/2021    MCV 86.5 08/10/2021    RDW 15.1 08/10/2021     08/10/2021       CMP:   Lab Results   Component Value Date     08/10/2021    K 3.7 08/10/2021    K 3.8 03/12/2020     08/10/2021    CO2 25 08/10/2021    BUN 9 08/10/2021    CREATININE 0.59 08/10/2021    GFRAA >60.0 08/10/2021    LABGLOM >60.0 08/10/2021    GLUCOSE 117 08/10/2021    PROT 7.4 08/10/2021    CALCIUM 9.1 08/10/2021    BILITOT 0.4 08/10/2021    ALKPHOS 75 08/10/2021    AST 56 08/10/2021    ALT 52 08/10/2021       POC Tests: No results for input(s): POCGLU, POCNA, POCK, POCCL, POCBUN, POCHEMO, POCHCT in the last 72 hours. Coags:   Lab Results   Component Value Date    PROTIME 10.5 11/30/2015    INR 1.0 11/30/2015    APTT 28.7 11/30/2015       HCG (If Applicable):   Lab Results   Component Value Date    PREGTESTUR negative 05/08/2021        ABGs: No results found for: PHART, PO2ART, XLS6MFS, HSL4QPB, BEART, X3QSGMSR     Type & Screen (If Applicable):  No results found for: LABABO, LABRH    Drug/Infectious Status (If Applicable):  No results found for: HIV, HEPCAB    COVID-19 Screening (If Applicable): No results found for: COVID19        Anesthesia Evaluation  Patient summary reviewed and Nursing notes reviewed no history of anesthetic complications:   Airway: Mallampati: II  TM distance: >3 FB   Neck ROM: full  Mouth opening: > = 3 FB Dental: normal exam         Pulmonary:Negative Pulmonary ROS and normal exam                               Cardiovascular:    (+) hypertension: no interval change,       ECG reviewed                        Neuro/Psych:   Negative Neuro/Psych ROS              GI/Hepatic/Renal:   (+) morbid obesity          Endo/Other: Negative Endo/Other ROS                    Abdominal:   (+) obese,           Vascular: negative vascular ROS.          Other Findings:             Anesthesia Plan      general ASA 3       Induction: intravenous. MIPS: Prophylactic antiemetics administered. Anesthetic plan and risks discussed with patient. Plan discussed with CRNA.     Attending anesthesiologist reviewed and agrees with Preprocedure content              João Petersen MD   8/26/2021

## 2021-08-31 ENCOUNTER — OFFICE VISIT (OUTPATIENT)
Dept: OBGYN CLINIC | Age: 41
End: 2021-08-31
Payer: MEDICARE

## 2021-08-31 VITALS
HEIGHT: 61 IN | WEIGHT: 269 LBS | HEART RATE: 84 BPM | DIASTOLIC BLOOD PRESSURE: 84 MMHG | SYSTOLIC BLOOD PRESSURE: 128 MMHG | BODY MASS INDEX: 50.79 KG/M2

## 2021-08-31 DIAGNOSIS — R10.2 PELVIC PAIN IN FEMALE: Primary | ICD-10-CM

## 2021-08-31 DIAGNOSIS — D25.1 INTRAMURAL LEIOMYOMA OF UTERUS: ICD-10-CM

## 2021-08-31 PROCEDURE — G8417 CALC BMI ABV UP PARAM F/U: HCPCS | Performed by: OBSTETRICS & GYNECOLOGY

## 2021-08-31 PROCEDURE — 1036F TOBACCO NON-USER: CPT | Performed by: OBSTETRICS & GYNECOLOGY

## 2021-08-31 PROCEDURE — 99213 OFFICE O/P EST LOW 20 MIN: CPT | Performed by: OBSTETRICS & GYNECOLOGY

## 2021-08-31 PROCEDURE — G8427 DOCREV CUR MEDS BY ELIG CLIN: HCPCS | Performed by: OBSTETRICS & GYNECOLOGY

## 2021-08-31 NOTE — PROGRESS NOTES
Dana Nugent is a 37 y.o. female who presents here today for complaints of Pelvic pain. Discuss hysterectomy. Had MRI done 20 which showed fibroids have not shrunk. S/p [place] > 6 mnth ago. MRI :   Uterus:         Size:  8.9 x 5.1 x 4.8 cm (excluding the large fibroid as below)           Orientation: Anteverted         Endometrium: Maximum width measures 8 mm. No endometrial lesion.         Junctional zone: Maximum thickness: 8 mm.  Homogeneous T2 hypointense signal.         Cervix: Nabothian cysts, otherwise unremarkable.         Leiomyomas: Leiomyomas present.  Index lesions as below:         Leiomyoma 1    Size: 14.7 x 9.9 x 14.2 cm (series 4 image 23, series 3 image 20)    Type: subserosal and exophytic    Location within the uterus: Exophytic from the fundus    Percent enhancement: 0%    Borders: Smooth    Other: This leiomyoma is mostly homogeneous with some areas of heterogeneous T2 signal along the superior aspect.         There are other small intramural leiomyomas without enhancement measuring 8mm in the right fundus and 10 mm in the left uterine segment         Adenomyomas: None            Ovaries:         - Right ovary:    Measures approximately 2.8 x 1.4 x 2.6 cm, unremarkable.         - Left ovary:    Measures 4.2 x 2.1 x 2.7 cm, unremarkable     .       Vitals:  /84 (Site: Left Upper Arm, Position: Sitting, Cuff Size: Large Adult)   Pulse 84   Ht 5' 1\" (1.549 m)   Wt 269 lb (122 kg)   BMI 50.83 kg/m²   Allergies:  Metronidazole and Percocet [oxycodone-acetaminophen]  Past Medical History:   Diagnosis Date    Abnormal Pap smear of cervix     Breast disorder     Fibroid     Hypertension      Past Surgical History:   Procedure Laterality Date    FOOT SURGERY      cyst on right foot as a child    UTERINE FIBROID EMBOLIZATION  2020    Dr Maryjane Campbell [phone]     OB History        0    Para   0    Term   0       0    AB   0    Living   0       SAB 0    TAB   0    Ectopic   0    Molar   0    Multiple   0    Live Births   0              Family History   Problem Relation Age of Onset    Diabetes Paternal Grandmother     Diabetes Father     Diabetes Paternal Aunt     Diabetes Paternal Uncle     Breast Cancer Neg Hx     Cancer Neg Hx     Colon Cancer Neg Hx     Eclampsia Neg Hx     Ovarian Cancer Neg Hx     Hypertension Neg Hx      Labor Neg Hx     Spont Abortions Neg Hx     Stroke Neg Hx      Social History     Socioeconomic History    Marital status: Single     Spouse name: Not on file    Number of children: Not on file    Years of education: Not on file    Highest education level: Not on file   Occupational History    Not on file   Tobacco Use    Smoking status: Former Smoker     Quit date: 2012     Years since quittin.0    Smokeless tobacco: Never Used   Vaping Use    Vaping Use: Never used   Substance and Sexual Activity    Alcohol use: Yes     Comment: On \"occasion liquor\"    Drug use: No    Sexual activity: Yes     Partners: Male     Comment: Condoms   Other Topics Concern    Not on file   Social History Narrative    Not on file     Social Determinants of Health     Financial Resource Strain:     Difficulty of Paying Living Expenses:    Food Insecurity:     Worried About Running Out of Food in the Last Year:     [address] in the Last Year:    Transportation Needs:     Lack of Transportation (Medical):      Lack of Transportation (Non-Medical):    Physical Activity:     Days of Exercise per Week:     Minutes of Exercise per Session:    Stress:     Feeling of Stress :    Social Connections:     Frequency of Communication with Friends and Family:     Frequency of Social Gatherings with Friends and Family:     Attends Mandaeism Services:     Active Member of Clubs or Organizations:     Attends Club or Organization Meetings:     Marital Status:    Intimate Partner Violence:     Fear of Current or Ex-Partner:     Emotionally Abused:     Physically Abused:     Sexually Abused:        Contraceptive method:  none    Patient's medications, allergies, past medical, surgical, social and family histories were reviewed and updated as appropriate. Review of Systems  As per chief complaint   All other systems reviewed and are negative. Pelvic pain     Physical Exam:  Vitals:  /84 (Site: Left Upper Arm, Position: Sitting, Cuff Size: Large Adult)   Pulse 84   Ht 5' 1\" (1.549 m)   Wt 269 lb (122 kg)   BMI 50.83 kg/m²   Lungs: CTAB   Heart : Regular S1/S2, no M/R/G  Abdomen: Soft , NT, ND , + BS   Pelvic exam : deferred    Assessment:      Diagnosis Orders   1. Pelvic pain in female     2. Intramural leiomyoma of uterus         Plan:     Large symptomatic fibroid uterus , menorrhagia has resolved since the [place] procedure, but patient still experiencing pelvic pain , still wants definitive treatment. Opted for surgical removal :   TLH with preservation of ovaries , possible laparotomy due to size of fibroid. Counseling: The patient was counseled on all options both medical and surgical, conservative as well as definitive. She has elected to proceed with the procedure as stated above. The patient was counseled on the procedure. Risks and complications were reviewed in detail. The patients orders, labs, consents have been completed. The history and physical as well as all supporting surgical documentation will be forwarded to the pre-operative holding area. The patient is aware that this procedure may not alleviate her symptoms. That there may be a necessity for a second surgery and that there may be an incomplete removal of abnormal tissue.     Discussed all risks and benefits of procedure in detail including risks of anesthesia, blood loss, need for transfusion, infection;  also potential for complication, injury, need for repair and/or removal of uterus, tubes, ovaries, bowel, bladder, ureters, blood vessels and nerves. Also discussed pre-operative and post-operative expectations. Patient verbalizes understanding. No orders of the defined types were placed in this encounter. No orders of the defined types were placed in this encounter. Patient was seen with total face to face time of 25 minutes. More than 50% of this visit was counseling and education regarding The primary encounter diagnosis was Pelvic pain in female. A diagnosis of Intramural leiomyoma of uterus was also pertinent to this visit. and Follow-up (Discuss surgery/plan of care.)   as well as  counseling on preventative health maintenance follow-up. Follow Up:  No follow-ups on file.         Nikia Lin MD

## 2021-09-02 NOTE — TELEPHONE ENCOUNTER
Pt calling in for something for pain until she is able to have surgery. The pain is really bad on some days.      Please sign if appropriate

## 2021-09-03 RX ORDER — IBUPROFEN 800 MG/1
800 TABLET ORAL
Qty: 90 TABLET | Refills: 1 | Status: ON HOLD | OUTPATIENT
Start: 2021-09-03 | End: 2021-09-26

## 2021-09-09 RX ORDER — POLYETHYLENE GLYCOL 3350 17 G/17G
POWDER, FOR SOLUTION ORAL
Qty: 2 PACKET | Refills: 2 | Status: ON HOLD | OUTPATIENT
Start: 2021-09-09 | End: 2021-09-26

## 2021-09-09 RX ORDER — CLINDAMYCIN PHOSPHATE 20 MG/G
CREAM VAGINAL
Qty: 40 G | Refills: 0 | Status: ON HOLD | OUTPATIENT
Start: 2021-09-09 | End: 2021-09-26

## 2021-09-15 ENCOUNTER — ANESTHESIA EVENT (OUTPATIENT)
Dept: OPERATING ROOM | Age: 41
End: 2021-09-15
Payer: MEDICARE

## 2021-09-15 NOTE — ANESTHESIA PRE PROCEDURE
Department of Anesthesiology  Preprocedure Note       Name:  Vaughn Nance   Age:  37 y.o.  :  1980                                          MRN:  [id number]         Date:  9/15/2021      Surgeon: Carolyn Saleh):  Terrence Aldridge MD    Procedure: Procedure(s):  TOTAL LAPAROSCOPIC HYSTERECTOMY WITH PRESERVATION OF OVARIES, POSSIBLE LAPAROTOMY. 3 HOURS, 1ST CASE    Medications prior to admission:   Prior to Admission medications    Medication Sig Start Date End Date Taking? Authorizing Provider   clindamycin (CLEOCIN) 2 % vaginal cream Place vaginally nightly for 1 week prior to surgery 21   MARIANGEL Stevens CNM   polyethylene glycol (MIRALAX) 17 g PACK packet 17 gram in am /  17 gram at 12 p day before surgery. 21   MARIANGEL Stevens CNM   ibuprofen (ADVIL;MOTRIN) 800 MG tablet Take 1 tablet by mouth 3 times daily (with meals) 9/3/21   She Casas MD   hydroCHLOROthiazide (HYDRODIURIL) 12.5 MG tablet Take 12.5 mg by mouth daily  Patient not taking: Reported on 2021   Historical Provider, MD   traZODone (DESYREL) 50 MG tablet Take 50 mg by mouth nightly 21   Historical Provider, MD   Ascorbic Acid (VITAMIN C) 500 MG CAPS Take 1 tablet by mouth daily    Historical Provider, MD   atenolol (TENORMIN) 25 MG tablet TAKE 1 TABLET BY MOUTH DAILY 21   Historical Provider, MD   gabapentin (NEURONTIN) 600 MG tablet daily.   3/1/21   Historical Provider, MD   potassium chloride (KLOR-CON) 10 MEQ extended release tablet 10 mEq daily  21   Historical Provider, MD   temazepam (RESTORIL) 30 MG capsule TAKE ONE CAPSULE BY MOUTH EVERY NIGHT AT BEDTIME AS NEEDED FOR SLEEP 12/10/20   Historical Provider, MD   traMADol (ULTRAM) 50 MG tablet TAKE 1 TABLET BY MOUTH EVERY 12 HOURS AS NEEDED FOR PAIN 2/3/21   Historical Provider, MD   ibuprofen (IBU) 800 MG tablet Take 1 tablet by mouth every 8 hours as needed for Pain 21   Dorthea Coma, APRN - CNP   amLODIPine (NORVASC) 5 MG Diagnosis Date    Abnormal Pap smear of cervix     Breast disorder     Fibroid     Hypertension        Past Surgical History:        Procedure Laterality Date    FOOT SURGERY      cyst on right foot as a child    UTERINE FIBROID EMBOLIZATION  2020    Dr Rosalino Shoemaker [phone]       Social History:    Social History     Tobacco Use    Smoking status: Former Smoker     Quit date: 2012     Years since quittin.0    Smokeless tobacco: Never Used   Substance Use Topics    Alcohol use: Yes     Comment: On \"occasion liquor\"                                Counseling given: Not Answered      Vital Signs (Current): There were no vitals filed for this visit. BP Readings from Last 3 Encounters:   21 128/84   21 (!) 179/95   21 (!) 151/88       NPO Status:                                                                                 BMI:   Wt Readings from Last 3 Encounters:   21 269 lb (122 kg)   21 265 lb (120.2 kg)   21 266 lb 12.8 oz (121 kg)     There is no height or weight on file to calculate BMI.    CBC:   Lab Results   Component Value Date    WBC 4.9 08/10/2021    RBC 4.52 08/10/2021    HGB 12.9 08/10/2021    HCT 39.1 08/10/2021    MCV 86.5 08/10/2021    RDW 15.1 08/10/2021     08/10/2021       CMP:   Lab Results   Component Value Date     08/10/2021    K 3.7 08/10/2021    K 3.8 2020     08/10/2021    CO2 25 08/10/2021    BUN 9 08/10/2021    CREATININE 0.59 08/10/2021    GFRAA >60.0 08/10/2021    LABGLOM >60.0 08/10/2021    GLUCOSE 117 08/10/2021    PROT 7.4 08/10/2021    CALCIUM 9.1 08/10/2021    BILITOT 0.4 08/10/2021    ALKPHOS 75 08/10/2021    AST 56 08/10/2021    ALT 52 08/10/2021       POC Tests: No results for input(s): POCGLU, POCNA, POCK, POCCL, POCBUN, POCHEMO, POCHCT in the last 72 hours.     Coags:   Lab Results   Component Value Date    PROTIME 10.5 2015    INR 1.0 11/30/2015    APTT 28.7 11/30/2015       HCG (If Applicable):   Lab Results   Component Value Date    PREGTESTUR negative 05/08/2021        ABGs: No results found for: PHART, PO2ART, KMY9HEQ, ULM5GMR, BEART, E0BCLMQP     Type & Screen (If Applicable):  No results found for: LABABO, LABRH    Drug/Infectious Status (If Applicable):  No results found for: HIV, HEPCAB    COVID-19 Screening (If Applicable): No results found for: COVID19        Anesthesia Evaluation  Patient summary reviewed and Nursing notes reviewed no history of anesthetic complications:   Airway: Mallampati: II  TM distance: >3 FB   Neck ROM: full  Mouth opening: > = 3 FB Dental: normal exam         Pulmonary:Negative Pulmonary ROS                              Cardiovascular:    (+) hypertension:,          Beta Blocker:  Dose within 24 Hrs         Neuro/Psych:   Negative Neuro/Psych ROS              GI/Hepatic/Renal:   (+) morbid obesity          Endo/Other: Negative Endo/Other ROS                    Abdominal:             Vascular: negative vascular ROS. Other Findings:           Anesthesia Plan      general     ASA 3     (TAP block)  Induction: intravenous. MIPS: Postoperative opioids intended and Prophylactic antiemetics administered. Anesthetic plan and risks discussed with patient. Plan discussed with CRNA.     Attending anesthesiologist reviewed and agrees with Vianney Kendrick MD   9/15/2021

## 2021-09-16 ENCOUNTER — HOSPITAL ENCOUNTER (OUTPATIENT)
Age: 41
Discharge: HOME | End: 2021-09-17
Attending: OBSTETRICS & GYNECOLOGY | Admitting: OBSTETRICS & GYNECOLOGY
Payer: MEDICARE

## 2021-09-16 ENCOUNTER — ANESTHESIA (OUTPATIENT)
Dept: OPERATING ROOM | Age: 41
End: 2021-09-16
Payer: MEDICARE

## 2021-09-16 VITALS — OXYGEN SATURATION: 91 % | SYSTOLIC BLOOD PRESSURE: 132 MMHG | TEMPERATURE: 95.4 F | DIASTOLIC BLOOD PRESSURE: 83 MMHG

## 2021-09-16 DIAGNOSIS — G89.18 POSTOPERATIVE PAIN: Primary | ICD-10-CM

## 2021-09-16 PROBLEM — D25.9 FIBROID UTERUS: Status: ACTIVE | Noted: 2021-09-16

## 2021-09-16 LAB
ABO/RH: NORMAL
ANTIBODY SCREEN: NORMAL
BASOPHILS ABSOLUTE: 0 K/UL (ref 0–0.2)
BASOPHILS RELATIVE PERCENT: 0.7 %
EOSINOPHILS ABSOLUTE: 0.1 K/UL (ref 0–0.7)
EOSINOPHILS RELATIVE PERCENT: 1.9 %
HCG, URINE, POC: NEGATIVE
HCT VFR BLD CALC: 37.7 % (ref 37–47)
HEMOGLOBIN: 12.7 G/DL (ref 12–16)
LYMPHOCYTES ABSOLUTE: 1.9 K/UL (ref 1–4.8)
LYMPHOCYTES RELATIVE PERCENT: 29.1 %
Lab: NORMAL
MCH RBC QN AUTO: 29.5 PG (ref 27–31.3)
MCHC RBC AUTO-ENTMCNC: 33.8 % (ref 33–37)
MCV RBC AUTO: 87.1 FL (ref 82–100)
MONOCYTES ABSOLUTE: 0.4 K/UL (ref 0.2–0.8)
MONOCYTES RELATIVE PERCENT: 6.8 %
NEGATIVE QC PASS/FAIL: NORMAL
NEUTROPHILS ABSOLUTE: 3.9 K/UL (ref 1.4–6.5)
NEUTROPHILS RELATIVE PERCENT: 61.5 %
PDW BLD-RTO: 14.6 % (ref 11.5–14.5)
PLATELET # BLD: 235 K/UL (ref 130–400)
POSITIVE QC PASS/FAIL: NORMAL
RBC # BLD: 4.33 M/UL (ref 4.2–5.4)
WBC # BLD: 6.4 K/UL (ref 4.8–10.8)

## 2021-09-16 PROCEDURE — 6360000002 HC RX W HCPCS: Performed by: ANESTHESIOLOGY

## 2021-09-16 PROCEDURE — 6370000000 HC RX 637 (ALT 250 FOR IP)

## 2021-09-16 PROCEDURE — 85025 COMPLETE CBC W/AUTO DIFF WBC: CPT

## 2021-09-16 PROCEDURE — 6370000000 HC RX 637 (ALT 250 FOR IP): Performed by: OBSTETRICS & GYNECOLOGY

## 2021-09-16 PROCEDURE — 2580000003 HC RX 258: Performed by: OBSTETRICS & GYNECOLOGY

## 2021-09-16 PROCEDURE — 7100000001 HC PACU RECOVERY - ADDTL 15 MIN: Performed by: OBSTETRICS & GYNECOLOGY

## 2021-09-16 PROCEDURE — 86850 RBC ANTIBODY SCREEN: CPT

## 2021-09-16 PROCEDURE — 2720000010 HC SURG SUPPLY STERILE: Performed by: OBSTETRICS & GYNECOLOGY

## 2021-09-16 PROCEDURE — 6370000000 HC RX 637 (ALT 250 FOR IP): Performed by: ANESTHESIOLOGY

## 2021-09-16 PROCEDURE — 58572 TLH UTERUS OVER 250 G: CPT | Performed by: OBSTETRICS & GYNECOLOGY

## 2021-09-16 PROCEDURE — 86901 BLOOD TYPING SEROLOGIC RH(D): CPT

## 2021-09-16 PROCEDURE — 3700000000 HC ANESTHESIA ATTENDED CARE: Performed by: OBSTETRICS & GYNECOLOGY

## 2021-09-16 PROCEDURE — C1760 CLOSURE DEV, VASC: HCPCS | Performed by: OBSTETRICS & GYNECOLOGY

## 2021-09-16 PROCEDURE — 6360000002 HC RX W HCPCS: Performed by: OBSTETRICS & GYNECOLOGY

## 2021-09-16 PROCEDURE — 7100000000 HC PACU RECOVERY - FIRST 15 MIN: Performed by: OBSTETRICS & GYNECOLOGY

## 2021-09-16 PROCEDURE — 3600000014 HC SURGERY LEVEL 4 ADDTL 15MIN: Performed by: OBSTETRICS & GYNECOLOGY

## 2021-09-16 PROCEDURE — 3600000004 HC SURGERY LEVEL 4 BASE: Performed by: OBSTETRICS & GYNECOLOGY

## 2021-09-16 PROCEDURE — 76942 ECHO GUIDE FOR BIOPSY: CPT | Performed by: ANESTHESIOLOGY

## 2021-09-16 PROCEDURE — 2709999900 HC NON-CHARGEABLE SUPPLY: Performed by: OBSTETRICS & GYNECOLOGY

## 2021-09-16 PROCEDURE — 86900 BLOOD TYPING SEROLOGIC ABO: CPT

## 2021-09-16 PROCEDURE — 2580000003 HC RX 258: Performed by: ANESTHESIOLOGY

## 2021-09-16 PROCEDURE — 88307 TISSUE EXAM BY PATHOLOGIST: CPT

## 2021-09-16 PROCEDURE — 3700000001 HC ADD 15 MINUTES (ANESTHESIA): Performed by: OBSTETRICS & GYNECOLOGY

## 2021-09-16 PROCEDURE — 2500000003 HC RX 250 WO HCPCS: Performed by: ANESTHESIOLOGY

## 2021-09-16 PROCEDURE — 2500000003 HC RX 250 WO HCPCS: Performed by: OBSTETRICS & GYNECOLOGY

## 2021-09-16 RX ORDER — LIDOCAINE HYDROCHLORIDE 10 MG/ML
1 INJECTION, SOLUTION EPIDURAL; INFILTRATION; INTRACAUDAL; PERINEURAL
Status: DISCONTINUED | OUTPATIENT
Start: 2021-09-16 | End: 2021-09-16 | Stop reason: HOSPADM

## 2021-09-16 RX ORDER — PANTOPRAZOLE SODIUM 40 MG/1
40 TABLET, DELAYED RELEASE ORAL
Status: DISCONTINUED | OUTPATIENT
Start: 2021-09-17 | End: 2021-09-17 | Stop reason: HOSPADM

## 2021-09-16 RX ORDER — AMLODIPINE BESYLATE 5 MG/1
5 TABLET ORAL DAILY
Status: DISCONTINUED | OUTPATIENT
Start: 2021-09-16 | End: 2021-09-17 | Stop reason: HOSPADM

## 2021-09-16 RX ORDER — OXYCODONE HYDROCHLORIDE AND ACETAMINOPHEN 5; 325 MG/1; MG/1
1 TABLET ORAL EVERY 4 HOURS PRN
Status: DISCONTINUED | OUTPATIENT
Start: 2021-09-16 | End: 2021-09-16

## 2021-09-16 RX ORDER — DIPHENHYDRAMINE HYDROCHLORIDE 50 MG/ML
12.5 INJECTION INTRAMUSCULAR; INTRAVENOUS
Status: DISCONTINUED | OUTPATIENT
Start: 2021-09-16 | End: 2021-09-16 | Stop reason: HOSPADM

## 2021-09-16 RX ORDER — LIDOCAINE HYDROCHLORIDE 20 MG/ML
JELLY TOPICAL PRN
Status: DISCONTINUED | OUTPATIENT
Start: 2021-09-16 | End: 2021-09-16 | Stop reason: ALTCHOICE

## 2021-09-16 RX ORDER — FENTANYL CITRATE 50 UG/ML
50 INJECTION, SOLUTION INTRAMUSCULAR; INTRAVENOUS EVERY 10 MIN PRN
Status: DISCONTINUED | OUTPATIENT
Start: 2021-09-16 | End: 2021-09-16 | Stop reason: HOSPADM

## 2021-09-16 RX ORDER — SODIUM CHLORIDE 0.9 % (FLUSH) 0.9 %
5-40 SYRINGE (ML) INJECTION PRN
Status: DISCONTINUED | OUTPATIENT
Start: 2021-09-16 | End: 2021-09-17 | Stop reason: HOSPADM

## 2021-09-16 RX ORDER — ROCURONIUM BROMIDE 10 MG/ML
INJECTION, SOLUTION INTRAVENOUS PRN
Status: DISCONTINUED | OUTPATIENT
Start: 2021-09-16 | End: 2021-09-16 | Stop reason: SDUPTHER

## 2021-09-16 RX ORDER — METOCLOPRAMIDE HYDROCHLORIDE 5 MG/ML
10 INJECTION INTRAMUSCULAR; INTRAVENOUS
Status: DISCONTINUED | OUTPATIENT
Start: 2021-09-16 | End: 2021-09-16 | Stop reason: HOSPADM

## 2021-09-16 RX ORDER — OXYCODONE HYDROCHLORIDE AND ACETAMINOPHEN 5; 325 MG/1; MG/1
2 TABLET ORAL NIGHTLY PRN
Qty: 20 TABLET | Refills: 0 | Status: SHIPPED | OUTPATIENT
Start: 2021-09-16 | End: 2021-09-17 | Stop reason: HOSPADM

## 2021-09-16 RX ORDER — ONDANSETRON 2 MG/ML
4 INJECTION INTRAMUSCULAR; INTRAVENOUS
Status: DISCONTINUED | OUTPATIENT
Start: 2021-09-16 | End: 2021-09-16 | Stop reason: HOSPADM

## 2021-09-16 RX ORDER — HYDROCHLOROTHIAZIDE 12.5 MG/1
12.5 TABLET ORAL DAILY
Status: DISCONTINUED | OUTPATIENT
Start: 2021-09-16 | End: 2021-09-17 | Stop reason: HOSPADM

## 2021-09-16 RX ORDER — OXYCODONE HYDROCHLORIDE AND ACETAMINOPHEN 5; 325 MG/1; MG/1
2 TABLET ORAL EVERY 4 HOURS PRN
Status: DISCONTINUED | OUTPATIENT
Start: 2021-09-16 | End: 2021-09-16

## 2021-09-16 RX ORDER — DEXAMETHASONE SODIUM PHOSPHATE 10 MG/ML
INJECTION INTRAMUSCULAR; INTRAVENOUS PRN
Status: DISCONTINUED | OUTPATIENT
Start: 2021-09-16 | End: 2021-09-16 | Stop reason: SDUPTHER

## 2021-09-16 RX ORDER — KETOROLAC TROMETHAMINE 30 MG/ML
30 INJECTION, SOLUTION INTRAMUSCULAR; INTRAVENOUS EVERY 6 HOURS
Status: DISCONTINUED | OUTPATIENT
Start: 2021-09-16 | End: 2021-09-17 | Stop reason: HOSPADM

## 2021-09-16 RX ORDER — SODIUM CHLORIDE 9 MG/ML
25 INJECTION, SOLUTION INTRAVENOUS PRN
Status: DISCONTINUED | OUTPATIENT
Start: 2021-09-16 | End: 2021-09-17 | Stop reason: HOSPADM

## 2021-09-16 RX ORDER — ONDANSETRON 2 MG/ML
4 INJECTION INTRAMUSCULAR; INTRAVENOUS EVERY 6 HOURS PRN
Status: DISCONTINUED | OUTPATIENT
Start: 2021-09-16 | End: 2021-09-16 | Stop reason: SDUPTHER

## 2021-09-16 RX ORDER — GABAPENTIN 300 MG/1
600 CAPSULE ORAL DAILY
Status: DISCONTINUED | OUTPATIENT
Start: 2021-09-16 | End: 2021-09-17 | Stop reason: HOSPADM

## 2021-09-16 RX ORDER — ACETAMINOPHEN 325 MG/1
650 TABLET ORAL EVERY 4 HOURS PRN
Status: DISCONTINUED | OUTPATIENT
Start: 2021-09-16 | End: 2021-09-17 | Stop reason: HOSPADM

## 2021-09-16 RX ORDER — ATENOLOL 25 MG/1
25 TABLET ORAL DAILY
Status: DISCONTINUED | OUTPATIENT
Start: 2021-09-16 | End: 2021-09-17 | Stop reason: HOSPADM

## 2021-09-16 RX ORDER — SODIUM CHLORIDE 0.9 % (FLUSH) 0.9 %
5-40 SYRINGE (ML) INJECTION EVERY 12 HOURS SCHEDULED
Status: DISCONTINUED | OUTPATIENT
Start: 2021-09-16 | End: 2021-09-17 | Stop reason: HOSPADM

## 2021-09-16 RX ORDER — DOCUSATE SODIUM 100 MG/1
100 CAPSULE, LIQUID FILLED ORAL 2 TIMES DAILY PRN
Qty: 60 CAPSULE | Refills: 2 | Status: ON HOLD | OUTPATIENT
Start: 2021-09-16 | End: 2021-09-26

## 2021-09-16 RX ORDER — KETOROLAC TROMETHAMINE 30 MG/ML
30 INJECTION, SOLUTION INTRAMUSCULAR; INTRAVENOUS EVERY 6 HOURS
Status: CANCELLED | OUTPATIENT
Start: 2021-09-16 | End: 2021-09-18

## 2021-09-16 RX ORDER — PROPOFOL 10 MG/ML
INJECTION, EMULSION INTRAVENOUS PRN
Status: DISCONTINUED | OUTPATIENT
Start: 2021-09-16 | End: 2021-09-16 | Stop reason: SDUPTHER

## 2021-09-16 RX ORDER — DIPHENHYDRAMINE HYDROCHLORIDE 50 MG/ML
25 INJECTION INTRAMUSCULAR; INTRAVENOUS EVERY 6 HOURS PRN
Status: DISCONTINUED | OUTPATIENT
Start: 2021-09-16 | End: 2021-09-17 | Stop reason: HOSPADM

## 2021-09-16 RX ORDER — ONDANSETRON 2 MG/ML
4 INJECTION INTRAMUSCULAR; INTRAVENOUS EVERY 6 HOURS PRN
Status: DISCONTINUED | OUTPATIENT
Start: 2021-09-16 | End: 2021-09-17 | Stop reason: HOSPADM

## 2021-09-16 RX ORDER — DIPHENHYDRAMINE HYDROCHLORIDE 50 MG/ML
25 INJECTION INTRAMUSCULAR; INTRAVENOUS EVERY 6 HOURS PRN
Status: DISCONTINUED | OUTPATIENT
Start: 2021-09-16 | End: 2021-09-16 | Stop reason: SDUPTHER

## 2021-09-16 RX ORDER — SODIUM CHLORIDE 0.9 % (FLUSH) 0.9 %
10 SYRINGE (ML) INJECTION EVERY 12 HOURS SCHEDULED
Status: DISCONTINUED | OUTPATIENT
Start: 2021-09-16 | End: 2021-09-16 | Stop reason: HOSPADM

## 2021-09-16 RX ORDER — LIDOCAINE HYDROCHLORIDE 20 MG/ML
INJECTION, SOLUTION INFILTRATION; PERINEURAL PRN
Status: DISCONTINUED | OUTPATIENT
Start: 2021-09-16 | End: 2021-09-16 | Stop reason: SDUPTHER

## 2021-09-16 RX ORDER — POLYETHYLENE GLYCOL 3350 17 G/17G
17 POWDER, FOR SOLUTION ORAL 2 TIMES DAILY PRN
Qty: 1020 G | Refills: 1 | Status: ON HOLD | OUTPATIENT
Start: 2021-09-16 | End: 2021-09-26

## 2021-09-16 RX ORDER — SUCCINYLCHOLINE/SOD CL,ISO/PF 100 MG/5ML
SYRINGE (ML) INTRAVENOUS PRN
Status: DISCONTINUED | OUTPATIENT
Start: 2021-09-16 | End: 2021-09-16 | Stop reason: SDUPTHER

## 2021-09-16 RX ORDER — PROMETHAZINE HYDROCHLORIDE 12.5 MG/1
12.5 TABLET ORAL EVERY 6 HOURS PRN
Status: DISCONTINUED | OUTPATIENT
Start: 2021-09-16 | End: 2021-09-17 | Stop reason: HOSPADM

## 2021-09-16 RX ORDER — ROPIVACAINE HYDROCHLORIDE 5 MG/ML
INJECTION, SOLUTION EPIDURAL; INFILTRATION; PERINEURAL PRN
Status: DISCONTINUED | OUTPATIENT
Start: 2021-09-16 | End: 2021-09-16 | Stop reason: SDUPTHER

## 2021-09-16 RX ORDER — IBUPROFEN 800 MG/1
800 TABLET ORAL EVERY 6 HOURS PRN
Qty: 120 TABLET | Refills: 3 | Status: SHIPPED | OUTPATIENT
Start: 2021-09-16 | End: 2022-01-13

## 2021-09-16 RX ORDER — MAGNESIUM HYDROXIDE 1200 MG/15ML
LIQUID ORAL CONTINUOUS PRN
Status: COMPLETED | OUTPATIENT
Start: 2021-09-16 | End: 2021-09-16

## 2021-09-16 RX ORDER — SODIUM CHLORIDE, SODIUM LACTATE, POTASSIUM CHLORIDE, CALCIUM CHLORIDE 600; 310; 30; 20 MG/100ML; MG/100ML; MG/100ML; MG/100ML
INJECTION, SOLUTION INTRAVENOUS CONTINUOUS
Status: DISCONTINUED | OUTPATIENT
Start: 2021-09-16 | End: 2021-09-16

## 2021-09-16 RX ORDER — ONDANSETRON 4 MG/1
4 TABLET, ORALLY DISINTEGRATING ORAL EVERY 8 HOURS PRN
Status: DISCONTINUED | OUTPATIENT
Start: 2021-09-16 | End: 2021-09-17 | Stop reason: HOSPADM

## 2021-09-16 RX ORDER — SIMETHICONE 80 MG
80 TABLET,CHEWABLE ORAL 4 TIMES DAILY PRN
Qty: 180 TABLET | Refills: 1 | Status: ON HOLD | OUTPATIENT
Start: 2021-09-16 | End: 2021-09-26

## 2021-09-16 RX ORDER — ONDANSETRON 2 MG/ML
INJECTION INTRAMUSCULAR; INTRAVENOUS PRN
Status: DISCONTINUED | OUTPATIENT
Start: 2021-09-16 | End: 2021-09-16 | Stop reason: SDUPTHER

## 2021-09-16 RX ORDER — ACETAMINOPHEN 500 MG
1000 TABLET ORAL EVERY 6 HOURS PRN
Qty: 60 TABLET | Refills: 1 | Status: SHIPPED | OUTPATIENT
Start: 2021-09-16 | End: 2022-01-13

## 2021-09-16 RX ORDER — ATENOLOL 25 MG/1
25 TABLET ORAL ONCE
Status: COMPLETED | OUTPATIENT
Start: 2021-09-16 | End: 2021-09-16

## 2021-09-16 RX ORDER — CEPHALEXIN 500 MG/1
500 CAPSULE ORAL 4 TIMES DAILY
Qty: 28 CAPSULE | Refills: 0 | Status: ON HOLD | OUTPATIENT
Start: 2021-09-16 | End: 2021-09-29 | Stop reason: HOSPADM

## 2021-09-16 RX ORDER — MEPERIDINE HYDROCHLORIDE 25 MG/ML
12.5 INJECTION INTRAMUSCULAR; INTRAVENOUS; SUBCUTANEOUS EVERY 5 MIN PRN
Status: DISCONTINUED | OUTPATIENT
Start: 2021-09-16 | End: 2021-09-16 | Stop reason: HOSPADM

## 2021-09-16 RX ORDER — KETOROLAC TROMETHAMINE 30 MG/ML
INJECTION, SOLUTION INTRAMUSCULAR; INTRAVENOUS PRN
Status: DISCONTINUED | OUTPATIENT
Start: 2021-09-16 | End: 2021-09-16 | Stop reason: SDUPTHER

## 2021-09-16 RX ORDER — SODIUM CHLORIDE 9 MG/ML
INJECTION, SOLUTION INTRAVENOUS CONTINUOUS
Status: DISCONTINUED | OUTPATIENT
Start: 2021-09-16 | End: 2021-09-17 | Stop reason: HOSPADM

## 2021-09-16 RX ORDER — FENTANYL CITRATE 50 UG/ML
INJECTION, SOLUTION INTRAMUSCULAR; INTRAVENOUS PRN
Status: DISCONTINUED | OUTPATIENT
Start: 2021-09-16 | End: 2021-09-16 | Stop reason: SDUPTHER

## 2021-09-16 RX ORDER — SODIUM CHLORIDE 0.9 % (FLUSH) 0.9 %
10 SYRINGE (ML) INJECTION PRN
Status: DISCONTINUED | OUTPATIENT
Start: 2021-09-16 | End: 2021-09-16 | Stop reason: HOSPADM

## 2021-09-16 RX ORDER — SODIUM CHLORIDE 9 MG/ML
25 INJECTION, SOLUTION INTRAVENOUS PRN
Status: DISCONTINUED | OUTPATIENT
Start: 2021-09-16 | End: 2021-09-16 | Stop reason: HOSPADM

## 2021-09-16 RX ORDER — TRAZODONE HYDROCHLORIDE 50 MG/1
50 TABLET ORAL NIGHTLY
Status: DISCONTINUED | OUTPATIENT
Start: 2021-09-16 | End: 2021-09-17 | Stop reason: HOSPADM

## 2021-09-16 RX ORDER — ONDANSETRON 4 MG/1
4 TABLET, FILM COATED ORAL EVERY 6 HOURS PRN
Qty: 30 TABLET | Refills: 1 | Status: ON HOLD | OUTPATIENT
Start: 2021-09-16 | End: 2021-09-26

## 2021-09-16 RX ORDER — MIDAZOLAM HYDROCHLORIDE 1 MG/ML
INJECTION INTRAMUSCULAR; INTRAVENOUS PRN
Status: DISCONTINUED | OUTPATIENT
Start: 2021-09-16 | End: 2021-09-16 | Stop reason: SDUPTHER

## 2021-09-16 RX ADMIN — ROCURONIUM BROMIDE 30 MG: 10 INJECTION INTRAVENOUS at 09:22

## 2021-09-16 RX ADMIN — KETOROLAC TROMETHAMINE 30 MG: 30 INJECTION, SOLUTION INTRAMUSCULAR; INTRAVENOUS at 21:50

## 2021-09-16 RX ADMIN — MEPERIDINE HYDROCHLORIDE 12.5 MG: 25 INJECTION, SOLUTION INTRAMUSCULAR; INTRAVENOUS; SUBCUTANEOUS at 12:07

## 2021-09-16 RX ADMIN — ACETAMINOPHEN 650 MG: 325 TABLET ORAL at 16:55

## 2021-09-16 RX ADMIN — ATENOLOL 25 MG: 25 TABLET ORAL at 07:06

## 2021-09-16 RX ADMIN — SUGAMMADEX 200 MG: 100 INJECTION, SOLUTION INTRAVENOUS at 11:29

## 2021-09-16 RX ADMIN — KETOROLAC TROMETHAMINE 30 MG: 30 INJECTION, SOLUTION INTRAMUSCULAR; INTRAVENOUS at 11:15

## 2021-09-16 RX ADMIN — FENTANYL CITRATE 100 MCG: 50 INJECTION, SOLUTION INTRAMUSCULAR; INTRAVENOUS at 07:54

## 2021-09-16 RX ADMIN — MIDAZOLAM HYDROCHLORIDE 6 MG: 2 INJECTION, SOLUTION INTRAMUSCULAR; INTRAVENOUS at 07:20

## 2021-09-16 RX ADMIN — ROPIVACAINE HYDROCHLORIDE 75 MG: 5 INJECTION, SOLUTION EPIDURAL; INFILTRATION; PERINEURAL at 07:28

## 2021-09-16 RX ADMIN — SODIUM CHLORIDE, POTASSIUM CHLORIDE, SODIUM LACTATE AND CALCIUM CHLORIDE: 600; 310; 30; 20 INJECTION, SOLUTION INTRAVENOUS at 07:00

## 2021-09-16 RX ADMIN — SODIUM CHLORIDE: 9 INJECTION, SOLUTION INTRAVENOUS at 20:18

## 2021-09-16 RX ADMIN — TRAZODONE HYDROCHLORIDE 50 MG: 50 TABLET ORAL at 21:50

## 2021-09-16 RX ADMIN — MEPERIDINE HYDROCHLORIDE 12.5 MG: 25 INJECTION, SOLUTION INTRAMUSCULAR; INTRAVENOUS; SUBCUTANEOUS at 12:02

## 2021-09-16 RX ADMIN — FENTANYL CITRATE 50 MCG: 50 INJECTION INTRAMUSCULAR; INTRAVENOUS at 12:39

## 2021-09-16 RX ADMIN — DEXAMETHASONE SODIUM PHOSPHATE 5 MG: 10 INJECTION INTRAMUSCULAR; INTRAVENOUS at 07:24

## 2021-09-16 RX ADMIN — CEFOXITIN SODIUM 2000 MG: 2 POWDER, FOR SOLUTION INTRAVENOUS at 07:58

## 2021-09-16 RX ADMIN — PROPOFOL 200 MG: 10 INJECTION, EMULSION INTRAVENOUS at 07:53

## 2021-09-16 RX ADMIN — ROPIVACAINE HYDROCHLORIDE 75 MG: 5 INJECTION, SOLUTION EPIDURAL; INFILTRATION; PERINEURAL at 07:24

## 2021-09-16 RX ADMIN — SODIUM CHLORIDE, POTASSIUM CHLORIDE, SODIUM LACTATE AND CALCIUM CHLORIDE: 600; 310; 30; 20 INJECTION, SOLUTION INTRAVENOUS at 09:22

## 2021-09-16 RX ADMIN — Medication 3000 MG: at 16:56

## 2021-09-16 RX ADMIN — ENOXAPARIN SODIUM 40 MG: 40 INJECTION SUBCUTANEOUS at 16:56

## 2021-09-16 RX ADMIN — VASOPRESSIN: 20 INJECTION INTRAVENOUS at 08:52

## 2021-09-16 RX ADMIN — KETOROLAC TROMETHAMINE 30 MG: 30 INJECTION, SOLUTION INTRAMUSCULAR; INTRAVENOUS at 14:55

## 2021-09-16 RX ADMIN — DEXAMETHASONE SODIUM PHOSPHATE 5 MG: 10 INJECTION INTRAMUSCULAR; INTRAVENOUS at 07:28

## 2021-09-16 RX ADMIN — AMLODIPINE BESYLATE 5 MG: 5 TABLET ORAL at 16:55

## 2021-09-16 RX ADMIN — GABAPENTIN 600 MG: 300 CAPSULE ORAL at 16:55

## 2021-09-16 RX ADMIN — CEFOXITIN SODIUM 2000 MG: 2 POWDER, FOR SOLUTION INTRAVENOUS at 09:58

## 2021-09-16 RX ADMIN — Medication 100 MG: at 07:53

## 2021-09-16 RX ADMIN — LIDOCAINE HYDROCHLORIDE 40 MG: 20 INJECTION, SOLUTION INFILTRATION; PERINEURAL at 07:53

## 2021-09-16 RX ADMIN — ROCURONIUM BROMIDE 50 MG: 10 INJECTION INTRAVENOUS at 07:55

## 2021-09-16 RX ADMIN — ONDANSETRON 4 MG: 2 INJECTION INTRAMUSCULAR; INTRAVENOUS at 09:46

## 2021-09-16 ASSESSMENT — PULMONARY FUNCTION TESTS
PIF_VALUE: 39
PIF_VALUE: 39
PIF_VALUE: 31
PIF_VALUE: 24
PIF_VALUE: 29
PIF_VALUE: 39
PIF_VALUE: 35
PIF_VALUE: 13
PIF_VALUE: 39
PIF_VALUE: 27
PIF_VALUE: 39
PIF_VALUE: 36
PIF_VALUE: 31
PIF_VALUE: 39
PIF_VALUE: 35
PIF_VALUE: 31
PIF_VALUE: 40
PIF_VALUE: 26
PIF_VALUE: 25
PIF_VALUE: 26
PIF_VALUE: 7
PIF_VALUE: 39
PIF_VALUE: 32
PIF_VALUE: 24
PIF_VALUE: 39
PIF_VALUE: 1
PIF_VALUE: 40
PIF_VALUE: 40
PIF_VALUE: 28
PIF_VALUE: 35
PIF_VALUE: 36
PIF_VALUE: 35
PIF_VALUE: 35
PIF_VALUE: 39
PIF_VALUE: 40
PIF_VALUE: 39
PIF_VALUE: 35
PIF_VALUE: 27
PIF_VALUE: 25
PIF_VALUE: 29
PIF_VALUE: 36
PIF_VALUE: 35
PIF_VALUE: 27
PIF_VALUE: 40
PIF_VALUE: 36
PIF_VALUE: 30
PIF_VALUE: 40
PIF_VALUE: 39
PIF_VALUE: 25
PIF_VALUE: 35
PIF_VALUE: 40
PIF_VALUE: 39
PIF_VALUE: 39
PIF_VALUE: 37
PIF_VALUE: 25
PIF_VALUE: 33
PIF_VALUE: 26
PIF_VALUE: 24
PIF_VALUE: 34
PIF_VALUE: 40
PIF_VALUE: 25
PIF_VALUE: 39
PIF_VALUE: 26
PIF_VALUE: 2
PIF_VALUE: 35
PIF_VALUE: 24
PIF_VALUE: 29
PIF_VALUE: 33
PIF_VALUE: 27
PIF_VALUE: 39
PIF_VALUE: 24
PIF_VALUE: 31
PIF_VALUE: 2
PIF_VALUE: 34
PIF_VALUE: 39
PIF_VALUE: 35
PIF_VALUE: 25
PIF_VALUE: 1
PIF_VALUE: 39
PIF_VALUE: 35
PIF_VALUE: 26
PIF_VALUE: 26
PIF_VALUE: 35
PIF_VALUE: 29
PIF_VALUE: 32
PIF_VALUE: 26
PIF_VALUE: 40
PIF_VALUE: 36
PIF_VALUE: 39
PIF_VALUE: 32
PIF_VALUE: 31
PIF_VALUE: 36
PIF_VALUE: 25
PIF_VALUE: 36
PIF_VALUE: 9
PIF_VALUE: 27
PIF_VALUE: 2
PIF_VALUE: 35
PIF_VALUE: 35
PIF_VALUE: 37
PIF_VALUE: 31
PIF_VALUE: 40
PIF_VALUE: 38
PIF_VALUE: 25
PIF_VALUE: 36
PIF_VALUE: 40
PIF_VALUE: 1
PIF_VALUE: 35
PIF_VALUE: 15
PIF_VALUE: 30
PIF_VALUE: 31
PIF_VALUE: 35
PIF_VALUE: 26
PIF_VALUE: 39
PIF_VALUE: 25
PIF_VALUE: 32
PIF_VALUE: 27
PIF_VALUE: 40
PIF_VALUE: 32
PIF_VALUE: 40
PIF_VALUE: 30
PIF_VALUE: 40
PIF_VALUE: 35
PIF_VALUE: 39
PIF_VALUE: 25
PIF_VALUE: 32
PIF_VALUE: 30
PIF_VALUE: 35
PIF_VALUE: 35
PIF_VALUE: 39
PIF_VALUE: 26
PIF_VALUE: 30
PIF_VALUE: 28
PIF_VALUE: 35
PIF_VALUE: 36
PIF_VALUE: 40
PIF_VALUE: 35
PIF_VALUE: 32
PIF_VALUE: 39
PIF_VALUE: 39
PIF_VALUE: 35
PIF_VALUE: 28
PIF_VALUE: 1
PIF_VALUE: 40
PIF_VALUE: 39
PIF_VALUE: 29
PIF_VALUE: 29
PIF_VALUE: 25
PIF_VALUE: 26
PIF_VALUE: 39
PIF_VALUE: 25
PIF_VALUE: 29
PIF_VALUE: 35
PIF_VALUE: 39
PIF_VALUE: 25
PIF_VALUE: 30
PIF_VALUE: 36
PIF_VALUE: 39
PIF_VALUE: 33
PIF_VALUE: 27
PIF_VALUE: 7
PIF_VALUE: 35
PIF_VALUE: 36
PIF_VALUE: 40
PIF_VALUE: 26
PIF_VALUE: 28
PIF_VALUE: 32
PIF_VALUE: 39
PIF_VALUE: 39
PIF_VALUE: 35
PIF_VALUE: 35
PIF_VALUE: 24
PIF_VALUE: 24
PIF_VALUE: 39
PIF_VALUE: 39
PIF_VALUE: 27
PIF_VALUE: 27
PIF_VALUE: 29
PIF_VALUE: 35
PIF_VALUE: 39
PIF_VALUE: 1
PIF_VALUE: 35
PIF_VALUE: 40
PIF_VALUE: 27
PIF_VALUE: 1
PIF_VALUE: 35
PIF_VALUE: 29
PIF_VALUE: 39
PIF_VALUE: 39
PIF_VALUE: 33
PIF_VALUE: 29
PIF_VALUE: 5
PIF_VALUE: 31
PIF_VALUE: 39
PIF_VALUE: 29
PIF_VALUE: 36
PIF_VALUE: 35
PIF_VALUE: 25
PIF_VALUE: 27
PIF_VALUE: 35
PIF_VALUE: 40
PIF_VALUE: 35
PIF_VALUE: 35
PIF_VALUE: 8
PIF_VALUE: 25
PIF_VALUE: 36
PIF_VALUE: 25
PIF_VALUE: 31
PIF_VALUE: 30
PIF_VALUE: 39
PIF_VALUE: 25
PIF_VALUE: 31
PIF_VALUE: 39
PIF_VALUE: 31
PIF_VALUE: 30
PIF_VALUE: 36
PIF_VALUE: 39
PIF_VALUE: 33
PIF_VALUE: 35
PIF_VALUE: 30
PIF_VALUE: 38
PIF_VALUE: 25
PIF_VALUE: 1
PIF_VALUE: 41
PIF_VALUE: 39
PIF_VALUE: 35
PIF_VALUE: 37
PIF_VALUE: 39
PIF_VALUE: 24

## 2021-09-16 ASSESSMENT — PAIN SCALES - GENERAL
PAINLEVEL_OUTOF10: 10
PAINLEVEL_OUTOF10: 8
PAINLEVEL_OUTOF10: 10

## 2021-09-16 ASSESSMENT — PAIN - FUNCTIONAL ASSESSMENT: PAIN_FUNCTIONAL_ASSESSMENT: 0-10

## 2021-09-16 NOTE — H&P
Freeman Garvey is a 37 y.o. female who presents here today for complaints of Pelvic pain. Discuss hysterectomy. Had MRI done 6/2/20 which showed fibroids have not shrunk. S/p [place] > 6 mnth ago.      MRI :   Uterus:         Size:  8.9 x 5.1 x 4.8 cm (excluding the large fibroid as below)           Orientation: Anteverted         Endometrium: Maximum width measures 8 mm. No endometrial lesion.         Junctional zone: Maximum thickness: 8 mm.  Homogeneous T2 hypointense signal.         Cervix: Nabothian cysts, otherwise unremarkable.         Leiomyomas: Leiomyomas present.  Index lesions as below:         Leiomyoma 1    Size: 14.7 x 9.9 x 14.2 cm (series 4 image 23, series 3 image 20)    Type: subserosal and exophytic    Location within the uterus: Exophytic from the fundus    Percent enhancement: 0%    Borders: Smooth    Other:  This leiomyoma is mostly homogeneous with some areas of heterogeneous T2 signal along the superior aspect.         There are other small intramural leiomyomas without enhancement measuring 8mm in the right fundus and 10 mm in the left uterine segment         Adenomyomas: None            Ovaries:         - Right ovary:    Measures approximately 2.8 x 1.4 x 2.6 cm, unremarkable.         - Left ovary:    Measures 4.2 x 2.1 x 2.7 cm, unremarkable      .        Vitals:  /84 (Site: Left Upper Arm, Position: Sitting, Cuff Size: Large Adult)   Pulse 84   Ht 5' 1\" (1.549 m)   Wt 269 lb (122 kg)   BMI 50.83 kg/m²   Allergies:  Metronidazole and Percocet [oxycodone-acetaminophen]  Past Medical History        Past Medical History:   Diagnosis Date    Abnormal Pap smear of cervix      Breast disorder      Fibroid      Hypertension           Past Surgical History         Past Surgical History:   Procedure Laterality Date    FOOT SURGERY         cyst on right foot as a child    UTERINE FIBROID EMBOLIZATION   03/11/2020     Dr Christopher Castaneda [phone]                  OB History         0    Para   0    Term   0       0    AB   0    Living   0        SAB   0    TAB   0    Ectopic   0    Molar   0    Multiple   0    Live Births   0                Family History         Family History   Problem Relation Age of Onset    Diabetes Paternal Grandmother      Diabetes Father      Diabetes Paternal Aunt      Diabetes Paternal Uncle      Breast Cancer Neg Hx      Cancer Neg Hx      Colon Cancer Neg Hx      Eclampsia Neg Hx      Ovarian Cancer Neg Hx      Hypertension Neg Hx       Labor Neg Hx      Spont Abortions Neg Hx      Stroke Neg Hx           Social History               Socioeconomic History    Marital status: Single       Spouse name: Not on file    Number of children: Not on file    Years of education: Not on file    Highest education level: Not on file   Occupational History    Not on file   Tobacco Use    Smoking status: Former Smoker       Quit date: 2012       Years since quittin.0    Smokeless tobacco: Never Used   Vaping Use    Vaping Use: Never used   Substance and Sexual Activity    Alcohol use: Yes       Comment: On \"occasion liquor\"    Drug use: No    Sexual activity: Yes       Partners: Male       Comment: Condoms   Other Topics Concern    Not on file   Social History Narrative    Not on file      Social Determinants of Health          Financial Resource Strain:     Difficulty of Paying Living Expenses:    Food Insecurity:     Worried About Running Out of Food in the Last Year:     [address] in the Last Year:    Transportation Needs:     Lack of Transportation (Medical):      Lack of Transportation (Non-Medical):    Physical Activity:     Days of Exercise per Week:     Minutes of Exercise per Session:    Stress:     Feeling of Stress :    Social Connections:     Frequency of Communication with Friends and Family:     Frequency of Social Gatherings with Friends and Family:     Attends Mosque Services:     Active Member of Clubs or Organizations:     Attends Club or Organization Meetings:     Marital Status:    Intimate Partner Violence:     Fear of Current or Ex-Partner:     Emotionally Abused:     Physically Abused:     Sexually Abused:             Contraceptive method:  none     Patient's medications, allergies, past medical, surgical, social and family histories were reviewed and updated as appropriate.     Review of Systems  As per chief complaint   All other systems reviewed and are negative. Pelvic pain      Physical Exam:  Vitals:  /84 (Site: Left Upper Arm, Position: Sitting, Cuff Size: Large Adult)   Pulse 84   Ht 5' 1\" (1.549 m)   Wt 269 lb (122 kg)   BMI 50.83 kg/m²   Lungs: CTAB   Heart : Regular S1/S2, no M/R/G  Abdomen: Soft , NT, ND , + BS   Pelvic exam : deferred     Assessment:        Diagnosis Orders   1. Pelvic pain in female      2. Intramural leiomyoma of uterus            Plan:      Large symptomatic fibroid uterus , menorrhagia has resolved since the [place] procedure, but patient still experiencing pelvic pain , still wants definitive treatment. Opted for surgical removal :   TLH with preservation of ovaries , possible laparotomy due to size of fibroid. Counseling: The patient was counseled on all options both medical and surgical, conservative as well as definitive. She has elected to proceed with the procedure as stated above.     The patient was counseled on the procedure. Risks and complications were reviewed in detail. The patients orders, labs, consents have been completed. The history and physical as well as all supporting surgical documentation will be forwarded to the pre-operative holding area.     The patient is aware that this procedure may not alleviate her symptoms.  That there may be a necessity for a second surgery and that there may be an incomplete removal of abnormal tissue.     Discussed all risks and benefits of procedure in detail including risks of anesthesia, blood loss, need for transfusion, infection;  also potential for complication, injury, need for repair and/or removal of uterus, tubes, ovaries, bowel, bladder, ureters, blood vessels and nerves. Also discussed pre-operative and post-operative expectations.   Patient verbalizes understanding.              Anamaria Sloan MD

## 2021-09-16 NOTE — BRIEF OP NOTE
Brief Postoperative Note      Patient: Christiano Wallis  YOB: 1980  MRN: [id number]    Date of Procedure: 9/16/2021    Pre-Op Diagnosis: UTERINE LEIOMYOMA, 20 wk sized uterus     Post-Op Diagnosis: Same . Massive fibroid . Procedure(s):  TOTAL LAPAROSCOPIC HYSTERECTOMY WITH PRESERVATION OF OVARIES, POSSIBLE LAPAROTOMY  Fibroid abdominal morcellation through minilaparotomy    Surgeon(s):  Tyron Multani MD    Assistant:  First Assistant: Beckie Jefferson    Anesthesia: General    Estimated Blood Loss (mL): less than 50     Complications: None    Specimens:   ID Type Source Tests Collected by Time Destination   A : uterus, cervix, bilateral tubes, fibroid Tissue Uterus SURGICAL PATHOLOGY Tyron Multani MD 9/16/2021 0854        Implants:  * No implants in log *      Drains:   Urethral Catheter Non-latex 16 fr (Active)       Findings: morcellation of the massive fibroid was for > 1 hour, through a minilap abdominal incision .      Electronically signed by Tyron Multani MD on 9/16/2021 at 11:22 AM

## 2021-09-16 NOTE — PROGRESS NOTES
Pt has concerns on going home and not being able to care for herself d/t her pain level, spoke to Dr Krishna Peraza and hes going to keep the pt overnight, awaiting orders for admit

## 2021-09-16 NOTE — ANESTHESIA POSTPROCEDURE EVALUATION
Department of Anesthesiology  Postprocedure Note    Patient: Vinicio Dixon  MRN: [id number]  YOB: 1980  Date of evaluation: 9/16/2021  Time:  11:52 AM     Procedure Summary     Date: 09/16/21 Room / [place] [facility] [place] [address] [place] [address]    Anesthesia Start: 5256 Anesthesia Stop: 1219    Procedure: TOTAL LAPAROSCOPIC HYSTERECTOMY WITH PRESERVATION OF OVARIES, POSSIBLE LAPAROTOMY (N/A Abdomen) Diagnosis: (UTERINE LEIOMYOMA)    Surgeons: Meghann Henderson MD Responsible Provider: Tia Garcia MD    Anesthesia Type: general ASA Status: 3          Anesthesia Type: general    Tutu Phase I:      Tutu Phase II:      Last vitals: Reviewed and per EMR flowsheets.        Anesthesia Post Evaluation    Patient location during evaluation: PACU  Patient participation: complete - patient participated  Level of consciousness: awake and alert  Airway patency: patent  Nausea & Vomiting: no nausea and no vomiting  Complications: no  Cardiovascular status: blood pressure returned to baseline and hemodynamically stable  Respiratory status: acceptable and face mask  Hydration status: euvolemic

## 2021-09-16 NOTE — PROGRESS NOTES
Patient arrived to floor. Requesting to be assisted to restroom. Pt has steady gait with assistance. Dressing is intact, free of drainage. Able to void 200ml of pale yellow urine. Scant to no bleeding. Pt requesting something for pain. Will medicate as able.

## 2021-09-17 VITALS
HEART RATE: 72 BPM | HEIGHT: 61 IN | BODY MASS INDEX: 50.22 KG/M2 | SYSTOLIC BLOOD PRESSURE: 107 MMHG | WEIGHT: 266 LBS | OXYGEN SATURATION: 94 % | RESPIRATION RATE: 18 BRPM | DIASTOLIC BLOOD PRESSURE: 61 MMHG | TEMPERATURE: 98.3 F

## 2021-09-17 LAB
HCT VFR BLD CALC: 35.1 % (ref 37–47)
HEMOGLOBIN: 12 G/DL (ref 12–16)
MCH RBC QN AUTO: 29.8 PG (ref 27–31.3)
MCHC RBC AUTO-ENTMCNC: 34.2 % (ref 33–37)
MCV RBC AUTO: 87.2 FL (ref 82–100)
PDW BLD-RTO: 14.8 % (ref 11.5–14.5)
PLATELET # BLD: 244 K/UL (ref 130–400)
RBC # BLD: 4.03 M/UL (ref 4.2–5.4)
WBC # BLD: 10.8 K/UL (ref 4.8–10.8)

## 2021-09-17 PROCEDURE — 6370000000 HC RX 637 (ALT 250 FOR IP): Performed by: OBSTETRICS & GYNECOLOGY

## 2021-09-17 PROCEDURE — 85027 COMPLETE CBC AUTOMATED: CPT

## 2021-09-17 PROCEDURE — 6360000002 HC RX W HCPCS: Performed by: OBSTETRICS & GYNECOLOGY

## 2021-09-17 PROCEDURE — 2580000003 HC RX 258: Performed by: OBSTETRICS & GYNECOLOGY

## 2021-09-17 RX ORDER — TRAMADOL HYDROCHLORIDE 50 MG/1
50 TABLET ORAL EVERY 4 HOURS PRN
Qty: 18 TABLET | Refills: 0 | Status: SHIPPED | OUTPATIENT
Start: 2021-09-17 | End: 2021-09-20

## 2021-09-17 RX ADMIN — HYDROCHLOROTHIAZIDE 12.5 MG: 12.5 TABLET ORAL at 10:02

## 2021-09-17 RX ADMIN — ATENOLOL 25 MG: 25 TABLET ORAL at 10:02

## 2021-09-17 RX ADMIN — Medication 3000 MG: at 00:13

## 2021-09-17 RX ADMIN — GABAPENTIN 600 MG: 300 CAPSULE ORAL at 10:03

## 2021-09-17 RX ADMIN — AMLODIPINE BESYLATE 5 MG: 5 TABLET ORAL at 10:02

## 2021-09-17 RX ADMIN — SODIUM CHLORIDE: 9 INJECTION, SOLUTION INTRAVENOUS at 05:35

## 2021-09-17 RX ADMIN — ENOXAPARIN SODIUM 40 MG: 40 INJECTION SUBCUTANEOUS at 10:03

## 2021-09-17 RX ADMIN — KETOROLAC TROMETHAMINE 30 MG: 30 INJECTION, SOLUTION INTRAMUSCULAR; INTRAVENOUS at 05:35

## 2021-09-17 ASSESSMENT — PAIN SCALES - GENERAL: PAINLEVEL_OUTOF10: 6

## 2021-09-17 NOTE — PROGRESS NOTES
CLINICAL PHARMACY NOTE: MEDS TO BEDS    Total # of Prescriptions Filled: 1   The following medications were delivered to the patient:  · Tramadol 50 mg Tab    Additional Documentation:

## 2021-09-24 ENCOUNTER — HOSPITAL ENCOUNTER (EMERGENCY)
Age: 41
Discharge: HOME | DRG: 721 | End: 2021-09-24
Attending: EMERGENCY MEDICINE
Payer: MEDICARE

## 2021-09-24 VITALS
RESPIRATION RATE: 16 BRPM | BODY MASS INDEX: 46.95 KG/M2 | WEIGHT: 275 LBS | HEIGHT: 64 IN | TEMPERATURE: 98.1 F | HEART RATE: 88 BPM | SYSTOLIC BLOOD PRESSURE: 151 MMHG | OXYGEN SATURATION: 99 % | DIASTOLIC BLOOD PRESSURE: 88 MMHG

## 2021-09-24 DIAGNOSIS — G89.18 POST-OP PAIN: Primary | ICD-10-CM

## 2021-09-24 PROCEDURE — 99283 EMERGENCY DEPT VISIT LOW MDM: CPT

## 2021-09-24 PROCEDURE — 6370000000 HC RX 637 (ALT 250 FOR IP): Performed by: EMERGENCY MEDICINE

## 2021-09-24 RX ORDER — TRAMADOL HYDROCHLORIDE 50 MG/1
50 TABLET ORAL EVERY 6 HOURS PRN
Qty: 20 TABLET | Refills: 0 | Status: SHIPPED | OUTPATIENT
Start: 2021-09-24 | End: 2021-09-29

## 2021-09-24 RX ORDER — TRAMADOL HYDROCHLORIDE 50 MG/1
50 TABLET ORAL ONCE
Status: COMPLETED | OUTPATIENT
Start: 2021-09-24 | End: 2021-09-24

## 2021-09-24 RX ORDER — CEPHALEXIN 500 MG/1
500 CAPSULE ORAL ONCE
Status: COMPLETED | OUTPATIENT
Start: 2021-09-24 | End: 2021-09-24

## 2021-09-24 RX ADMIN — CEPHALEXIN 500 MG: 500 CAPSULE ORAL at 20:53

## 2021-09-24 RX ADMIN — TRAMADOL HYDROCHLORIDE 50 MG: 50 TABLET, FILM COATED ORAL at 20:53

## 2021-09-24 ASSESSMENT — PAIN DESCRIPTION - ORIENTATION: ORIENTATION: LEFT

## 2021-09-24 ASSESSMENT — PAIN DESCRIPTION - PAIN TYPE: TYPE: ACUTE PAIN

## 2021-09-24 ASSESSMENT — PAIN SCALES - GENERAL
PAINLEVEL_OUTOF10: 10
PAINLEVEL_OUTOF10: 10
PAINLEVEL_OUTOF10: 7

## 2021-09-24 ASSESSMENT — PAIN DESCRIPTION - PROGRESSION: CLINICAL_PROGRESSION: GRADUALLY WORSENING

## 2021-09-24 ASSESSMENT — PAIN DESCRIPTION - LOCATION: LOCATION: ABDOMEN

## 2021-09-24 ASSESSMENT — ENCOUNTER SYMPTOMS: ABDOMINAL PAIN: 1

## 2021-09-24 ASSESSMENT — PAIN DESCRIPTION - DESCRIPTORS: DESCRIPTORS: SHARP

## 2021-09-24 ASSESSMENT — PAIN DESCRIPTION - FREQUENCY: FREQUENCY: INTERMITTENT

## 2021-09-24 ASSESSMENT — PAIN SCALES - WONG BAKER: WONGBAKER_NUMERICALRESPONSE: 2

## 2021-09-24 ASSESSMENT — PAIN DESCRIPTION - ONSET: ONSET: ON-GOING

## 2021-09-24 ASSESSMENT — PAIN - FUNCTIONAL ASSESSMENT: PAIN_FUNCTIONAL_ASSESSMENT: PREVENTS OR INTERFERES SOME ACTIVE ACTIVITIES AND ADLS

## 2021-09-25 NOTE — ED NOTES
Pt provided with discharge instructions and RX x2. Medication education completed. Pt verbalizes understanding; denies questions. Pt ambulatory off unit in stable condition.        Loc William RN  09/24/21 4866

## 2021-09-25 NOTE — ED PROVIDER NOTES
[address] [facility] ED  EMERGENCY MEDICINE     Pt Name: Sunny Gabriel  MRN: [id number]  Brandyngfadin 1980  Date of evaluation: 9/24/2021  PCP:    No primary care provider on file. Provider: Gautam Warren Citizen       Chief Complaint   Patient presents with    Post-op Problem       HISTORY OF PRESENT ILLNESS    HPI     43-year-old female with postop 8 days of hysterectomy by . Patient states she was having increased pain along her lower incision. States that it was laparoscopic but he did have to make a larger incision on the inferior part of her abdomen under her pannus to remove the uterus as it was too big. Patient states she was given tramadol for postop pain and states that she just ran out of it today at around 4 PM.  She did call  office for refill of the tramadol but he stated that she should be seen to make sure that there was no infection in her incision. Denies any fevers or chills. She has been urinating appropriately and having normal bowel movements and gas. Denies any other abdominal pain other than the area of the incision. Denies chest pain or shortness of breath. Triage notes and Nursing notes were reviewed by myself. Any discrepancies are addressed above.     PAST MEDICAL HISTORY     Past Medical History:   Diagnosis Date    Abnormal Pap smear of cervix     Breast disorder     Fibroid     Hypertension        SURGICAL HISTORY       Past Surgical History:   Procedure Laterality Date    FOOT SURGERY      cyst on right foot as a child    HYSTERECTOMY, VAGINAL N/A 9/16/2021    LAPAROSCOPIC HYSTERECTOMY, BILATERAL SALPINGECTOMY, LAPAROTOMY ASSISTED MYOMECTOMY performed by Yann Piper MD at [address] [place] [address]  03/11/2020    Dr Sanches Pitch [phone]       CURRENT MEDICATIONS       Discharge Medication List as of 9/24/2021  9:05 PM      CONTINUE these medications which have NOT CHANGED    Details   !! ibuprofen (ADVIL;MOTRIN) 800 MG tablet Take 1 tablet by mouth every 6 hours as needed for Pain, Disp-120 tablet, R-3Normal      acetaminophen (APAP EXTRA STRENGTH) 500 MG tablet Take 2 tablets by mouth every 6 hours as needed for Pain, Disp-60 tablet, R-1Normal      simethicone (MYLICON) 80 MG chewable tablet Take 1 tablet by mouth 4 times daily as needed for Flatulence, Disp-180 tablet, R-1Normal      polyethylene glycol (MIRALAX) 17 GM/SCOOP powder Take 17 g by mouth 2 times daily as needed (constipation), Disp-1020 g, R-1Normal      cephALEXin (KEFLEX) 500 MG capsule Take 1 capsule by mouth 4 times daily, Disp-28 capsule, R-0Normal      ondansetron (ZOFRAN) 4 MG tablet Take 1 tablet by mouth every 6 hours as needed for Nausea or Vomiting 1 tablet every 6 hrs prn for nausea and vomiting., Disp-30 tablet, R-1Normal      docusate sodium (COLACE) 100 MG capsule Take 1 capsule by mouth 2 times daily as needed for Constipation, Disp-60 capsule, R-2Normal      clindamycin (CLEOCIN) 2 % vaginal cream Place vaginally nightly for 1 week prior to surgery, Disp-40 g, R-0Normal      polyethylene glycol (MIRALAX) 17 g PACK packet 17 gram in am /  17 gram at 12 p day before surgery. , Disp-2 packet, R-2Normal      !! ibuprofen (ADVIL;MOTRIN) 800 MG tablet Take 1 tablet by mouth 3 times daily (with meals), Disp-90 tablet, R-1Normal      traZODone (DESYREL) 50 MG tablet Take 50 mg by mouth nightlyHistorical Med      Ascorbic Acid (VITAMIN C) 500 MG CAPS Take 1 tablet by mouth dailyHistorical Med      atenolol (TENORMIN) 25 MG tablet TAKE 1 TABLET BY MOUTH DAILYHistorical Med      gabapentin (NEURONTIN) 600 MG tablet daily.  Historical Med      potassium chloride (KLOR-CON) 10 MEQ extended release tablet 10 mEq daily Historical Med      temazepam (RESTORIL) 30 MG capsule TAKE ONE CAPSULE BY MOUTH EVERY NIGHT AT BEDTIME AS NEEDED FOR SLEEPHistorical Med      !! ibuprofen (IBU) 800 MG tablet Take 1 tablet by mouth every 8 hours as needed for Pain, Disp-20 tablet, R-0Print      omeprazole (PRILOSEC) 20 MG delayed release capsule Take 20 mg by mouth Daily Historical Med       !! - Potential duplicate medications found. Please discuss with provider. ALLERGIES       Allergies   Allergen Reactions    Metronidazole Hives    Percocet [Oxycodone-Acetaminophen] Hives       FAMILY HISTORY       Family History   Problem Relation Age of Onset    Diabetes Paternal Grandmother     Diabetes Father     Diabetes Paternal Aunt     Diabetes Paternal Uncle     Breast Cancer Neg Hx     Cancer Neg Hx     Colon Cancer Neg Hx     Eclampsia Neg Hx     Ovarian Cancer Neg Hx     Hypertension Neg Hx      Labor Neg Hx     Spont Abortions Neg Hx     Stroke Neg Hx         SOCIAL HISTORY       Social History     Socioeconomic History    Marital status: Single     Spouse name: None    Number of children: None    Years of education: None    Highest education level: None   Occupational History    None   Tobacco Use    Smoking status: Former Smoker     Quit date: 2012     Years since quittin.1    Smokeless tobacco: Never Used   Vaping Use    Vaping Use: Never used   Substance and Sexual Activity    Alcohol use: Yes     Comment: On \"occasion liquor\"    Drug use: No    Sexual activity: Yes     Partners: Male     Comment: Condoms   Other Topics Concern    None   Social History Narrative    None     Social Determinants of Health     Financial Resource Strain:     Difficulty of Paying Living Expenses:    Food Insecurity:     Worried About Running Out of Food in the Last Year:     Ran Out of Food in the Last Year:    Transportation Needs:     Lack of Transportation (Medical):      Lack of Transportation (Non-Medical):    Physical Activity:     Days of Exercise per Week:     Minutes of Exercise per Session:    Stress:     Feeling of Stress :    Social Connections:     Frequency of Communication with Friends and Family:     Frequency of Social Gatherings with Friends and Family:     Attends Yazidi Services:     Active Member of Clubs or Organizations:     Attends Club or Organization Meetings:     Marital Status:    Intimate Partner Violence:     Fear of Current or Ex-Partner:     Emotionally Abused:     Physically Abused:     Sexually Abused:        REVIEW OF SYSTEMS     Review of Systems   Gastrointestinal: Positive for abdominal pain. Skin: Positive for wound. Except as noted above the remainder of the review of systems was reviewed and is negative. SCREENINGS                        PHYSICAL EXAM    (up to 7 for level 4, 8 or more for level 5)     ED Triage Vitals [09/24/21 2012]   BP Temp Temp Source Pulse Resp SpO2 Height Weight   (!) 151/88 98.1 °F (36.7 °C) Temporal 93 16 99 % 5' 4\" (1.626 m) 275 lb (124.7 kg)       Physical Exam  Constitutional:       General: She is not in acute distress. Appearance: Normal appearance. She is normal weight. She is not ill-appearing. HENT:      Head: Normocephalic and atraumatic. Right Ear: External ear normal.      Left Ear: External ear normal.      Nose: Nose normal. No congestion or rhinorrhea. Mouth/Throat:      Mouth: Mucous membranes are moist.      Pharynx: No oropharyngeal exudate or posterior oropharyngeal erythema. Eyes:      General:         Right eye: No discharge. Left eye: No discharge. Extraocular Movements: Extraocular movements intact. Pupils: Pupils are equal, round, and reactive to light. Cardiovascular:      Rate and Rhythm: Normal rate and regular rhythm. Pulses: Normal pulses. Pulmonary:      Effort: Pulmonary effort is normal.      Breath sounds: Normal breath sounds. Abdominal:      General: Abdomen is flat. Bowel sounds are normal. There is no distension. Tenderness: There is no abdominal tenderness. There is no right CVA tenderness, left CVA tenderness, guarding or rebound.    Musculoskeletal: General: No swelling or tenderness. Normal range of motion. Cervical back: Normal range of motion and neck supple. Right lower leg: No edema. Left lower leg: No edema. Skin:     General: Skin is warm and dry. Capillary Refill: Capillary refill takes less than 2 seconds. Neurological:      General: No focal deficit present. Mental Status: She is alert. Psychiatric:         Mood and Affect: Mood normal.           DIAGNOSTIC RESULTS     EKG:(none if blank)  All EKGs are interpreted by the Emergency Department Physician who either signs or Co-signs this chart in the absence of a cardiologist.        RADIOLOGY: (none if blank)   I directly visualized the following images and reviewed the radiologist interpretations. Interpretation per the Radiologist below, if available at the time of this note:  No orders to display       LABS:  Labs Reviewed - No data to display    All other labs were within normal range or not returned as of this dictation. Please note, any cultures that may have been sent were not resulted at the time of this patient visit. EMERGENCY DEPARTMENT COURSE and Medical Decision Making:     Vitals:    Vitals:    09/24/21 2012 09/24/21 2100   BP: (!) 151/88    Pulse: 93 88   Resp: 16 16   Temp: 98.1 °F (36.7 °C)    TempSrc: Temporal    SpO2: 99% 99%   Weight: 275 lb (124.7 kg)    Height: 5' 4\" (1.626 m)        PROCEDURES: (None if blank)  Procedures       MDM     Patient had a hysterectomy done last week. She was supposed to be taking Keflex to decrease risk of wound infection but patient states that it \"caused her to have too much diarrhea so she stopped it after the first day\". Explained to the patient that she may have started developing a cellulitis on the lower incision especially since it started underneath her pannus which makes it harder for the wound to heal appropriately.   Explained to the patient that she should stop taking the docusate and start taking the Keflex since that will help her with her bowel movements. I did give her a dose here. I did give her a dose of tramadol as well for pain control and discharge her with a repeat prescription for tramadol as she states it is \"difficult and painful to wipe after having a bowel movement\". Patient was able to walk out of the emergency department with no issues after giving tramadol and Keflex. She does have a follow-up appointment next week with Dr. Miri Feldman. Strict return precautions and follow up instructions were discussed with the patient with which the patient agrees    ED Medications administered this visit:    Medications   traMADol (ULTRAM) tablet 50 mg (50 mg Oral Given 9/24/21 2053)   cephALEXin (KEFLEX) capsule 500 mg (500 mg Oral Given 9/24/21 2053)         FINAL IMPRESSION      1. Post-op pain          DISPOSITION/PLAN   DISPOSITION Decision To Discharge 09/24/2021 08:43:37 PM      PATIENT REFERRED TO:  No follow-up provider specified.     DISCHARGE MEDICATIONS:  Discharge Medication List as of 9/24/2021  9:05 PM                 Eliceo Best DO (electronically signed)  Attending Physician, Emergency Department         Eliceo Best DO  09/24/21 6133

## 2021-09-26 ENCOUNTER — APPOINTMENT (OUTPATIENT)
Dept: ULTRASOUND IMAGING | Age: 41
DRG: 721 | End: 2021-09-26
Payer: MEDICARE

## 2021-09-26 ENCOUNTER — HOSPITAL ENCOUNTER (INPATIENT)
Age: 41
LOS: 3 days | Discharge: HOME | DRG: 721 | End: 2021-09-29
Attending: STUDENT IN AN ORGANIZED HEALTH CARE EDUCATION/TRAINING PROGRAM | Admitting: OBSTETRICS & GYNECOLOGY
Payer: MEDICARE

## 2021-09-26 DIAGNOSIS — T81.49XA POST-OPERATIVE WOUND ABSCESS: Primary | ICD-10-CM

## 2021-09-26 PROBLEM — K65.1 POSTOPERATIVE INTRA-ABDOMINAL ABSCESS (HCC): Status: ACTIVE | Noted: 2021-09-26

## 2021-09-26 PROBLEM — T81.43XA POSTOPERATIVE INTRA-ABDOMINAL ABSCESS: Status: ACTIVE | Noted: 2021-09-26

## 2021-09-26 LAB
ALBUMIN SERPL-MCNC: 3.5 G/DL (ref 3.5–4.6)
ALP BLD-CCNC: 68 U/L (ref 40–130)
ALT SERPL-CCNC: 16 U/L (ref 0–33)
ANION GAP SERPL CALCULATED.3IONS-SCNC: 10 MEQ/L (ref 9–15)
AST SERPL-CCNC: 20 U/L (ref 0–35)
BASOPHILS ABSOLUTE: 0 K/UL (ref 0–0.2)
BASOPHILS RELATIVE PERCENT: 0.5 %
BILIRUB SERPL-MCNC: 0.3 MG/DL (ref 0.2–0.7)
BUN BLDV-MCNC: 10 MG/DL (ref 6–20)
C-REACTIVE PROTEIN: 125.9 MG/L (ref 0–5)
CALCIUM SERPL-MCNC: 8.8 MG/DL (ref 8.5–9.9)
CHLORIDE BLD-SCNC: 104 MEQ/L (ref 95–107)
CO2: 26 MEQ/L (ref 20–31)
CREAT SERPL-MCNC: 0.6 MG/DL (ref 0.5–0.9)
EOSINOPHILS ABSOLUTE: 0.1 K/UL (ref 0–0.7)
EOSINOPHILS RELATIVE PERCENT: 1.3 %
GFR AFRICAN AMERICAN: >60
GFR NON-AFRICAN AMERICAN: >60
GLOBULIN: 3.4 G/DL (ref 2.3–3.5)
GLUCOSE BLD-MCNC: 92 MG/DL (ref 70–99)
HCT VFR BLD CALC: 35.3 % (ref 37–47)
HEMOGLOBIN: 11.7 G/DL (ref 12–16)
LACTIC ACID: 0.7 MMOL/L (ref 0.5–2.2)
LYMPHOCYTES ABSOLUTE: 1.4 K/UL (ref 1–4.8)
LYMPHOCYTES RELATIVE PERCENT: 18.6 %
MCH RBC QN AUTO: 28.4 PG (ref 27–31.3)
MCHC RBC AUTO-ENTMCNC: 33 % (ref 33–37)
MCV RBC AUTO: 86.1 FL (ref 82–100)
MONOCYTES ABSOLUTE: 0.4 K/UL (ref 0.2–0.8)
MONOCYTES RELATIVE PERCENT: 5.5 %
NEUTROPHILS ABSOLUTE: 5.5 K/UL (ref 1.4–6.5)
NEUTROPHILS RELATIVE PERCENT: 74.1 %
PDW BLD-RTO: 14.7 % (ref 11.5–14.5)
PLATELET # BLD: 311 K/UL (ref 130–400)
POTASSIUM SERPL-SCNC: 3.8 MEQ/L (ref 3.4–4.9)
PROCALCITONIN: 0.04 NG/ML (ref 0–0.15)
RBC # BLD: 4.1 M/UL (ref 4.2–5.4)
SODIUM BLD-SCNC: 140 MEQ/L (ref 135–144)
TOTAL PROTEIN: 6.9 G/DL (ref 6.3–8)
WBC # BLD: 7.4 K/UL (ref 4.8–10.8)

## 2021-09-26 PROCEDURE — 99223 1ST HOSP IP/OBS HIGH 75: CPT | Performed by: OBSTETRICS & GYNECOLOGY

## 2021-09-26 PROCEDURE — 83605 ASSAY OF LACTIC ACID: CPT

## 2021-09-26 PROCEDURE — 6370000000 HC RX 637 (ALT 250 FOR IP): Performed by: OBSTETRICS & GYNECOLOGY

## 2021-09-26 PROCEDURE — 84145 PROCALCITONIN (PCT): CPT

## 2021-09-26 PROCEDURE — 87205 SMEAR GRAM STAIN: CPT

## 2021-09-26 PROCEDURE — 86140 C-REACTIVE PROTEIN: CPT

## 2021-09-26 PROCEDURE — 2580000003 HC RX 258: Performed by: STUDENT IN AN ORGANIZED HEALTH CARE EDUCATION/TRAINING PROGRAM

## 2021-09-26 PROCEDURE — 6360000002 HC RX W HCPCS: Performed by: STUDENT IN AN ORGANIZED HEALTH CARE EDUCATION/TRAINING PROGRAM

## 2021-09-26 PROCEDURE — 85025 COMPLETE CBC W/AUTO DIFF WBC: CPT

## 2021-09-26 PROCEDURE — 36415 COLL VENOUS BLD VENIPUNCTURE: CPT

## 2021-09-26 PROCEDURE — 1210000000 HC MED SURG R&B

## 2021-09-26 PROCEDURE — 96374 THER/PROPH/DIAG INJ IV PUSH: CPT

## 2021-09-26 PROCEDURE — 99284 EMERGENCY DEPT VISIT MOD MDM: CPT

## 2021-09-26 PROCEDURE — 87070 CULTURE OTHR SPECIMN AEROBIC: CPT

## 2021-09-26 PROCEDURE — 76999 ECHO EXAMINATION PROCEDURE: CPT

## 2021-09-26 PROCEDURE — 80053 COMPREHEN METABOLIC PANEL: CPT

## 2021-09-26 PROCEDURE — 87040 BLOOD CULTURE FOR BACTERIA: CPT

## 2021-09-26 PROCEDURE — 2580000003 HC RX 258: Performed by: OBSTETRICS & GYNECOLOGY

## 2021-09-26 PROCEDURE — 87186 SC STD MICRODIL/AGAR DIL: CPT

## 2021-09-26 PROCEDURE — 87075 CULTR BACTERIA EXCEPT BLOOD: CPT

## 2021-09-26 PROCEDURE — 87077 CULTURE AEROBIC IDENTIFY: CPT

## 2021-09-26 RX ORDER — GABAPENTIN 300 MG/1
600 CAPSULE ORAL 2 TIMES DAILY PRN
Status: DISCONTINUED | OUTPATIENT
Start: 2021-09-26 | End: 2021-09-29 | Stop reason: HOSPADM

## 2021-09-26 RX ORDER — PANTOPRAZOLE SODIUM 40 MG/1
40 TABLET, DELAYED RELEASE ORAL
Status: DISCONTINUED | OUTPATIENT
Start: 2021-09-27 | End: 2021-09-29 | Stop reason: HOSPADM

## 2021-09-26 RX ORDER — POTASSIUM CHLORIDE 750 MG/1
10 TABLET, FILM COATED, EXTENDED RELEASE ORAL DAILY
Status: DISCONTINUED | OUTPATIENT
Start: 2021-09-26 | End: 2021-09-29 | Stop reason: HOSPADM

## 2021-09-26 RX ORDER — FENTANYL CITRATE 50 UG/ML
25 INJECTION, SOLUTION INTRAMUSCULAR; INTRAVENOUS
Status: DISCONTINUED | OUTPATIENT
Start: 2021-09-26 | End: 2021-09-26 | Stop reason: HOSPADM

## 2021-09-26 RX ORDER — ATENOLOL 25 MG/1
25 TABLET ORAL DAILY
Status: DISCONTINUED | OUTPATIENT
Start: 2021-09-26 | End: 2021-09-29 | Stop reason: HOSPADM

## 2021-09-26 RX ORDER — ACETAMINOPHEN 325 MG/1
650 TABLET ORAL EVERY 4 HOURS PRN
Status: DISCONTINUED | OUTPATIENT
Start: 2021-09-26 | End: 2021-09-29 | Stop reason: HOSPADM

## 2021-09-26 RX ORDER — SODIUM CHLORIDE 0.9 % (FLUSH) 0.9 %
5-40 SYRINGE (ML) INJECTION EVERY 12 HOURS SCHEDULED
Status: DISCONTINUED | OUTPATIENT
Start: 2021-09-26 | End: 2021-09-29 | Stop reason: HOSPADM

## 2021-09-26 RX ORDER — KETOROLAC TROMETHAMINE 30 MG/ML
30 INJECTION, SOLUTION INTRAMUSCULAR; INTRAVENOUS ONCE
Status: COMPLETED | OUTPATIENT
Start: 2021-09-26 | End: 2021-09-26

## 2021-09-26 RX ORDER — SODIUM CHLORIDE 9 MG/ML
25 INJECTION, SOLUTION INTRAVENOUS PRN
Status: DISCONTINUED | OUTPATIENT
Start: 2021-09-26 | End: 2021-09-29 | Stop reason: HOSPADM

## 2021-09-26 RX ORDER — SODIUM CHLORIDE 0.9 % (FLUSH) 0.9 %
5-40 SYRINGE (ML) INJECTION PRN
Status: DISCONTINUED | OUTPATIENT
Start: 2021-09-26 | End: 2021-09-29 | Stop reason: HOSPADM

## 2021-09-26 RX ORDER — KETOROLAC TROMETHAMINE 30 MG/ML
30 INJECTION, SOLUTION INTRAMUSCULAR; INTRAVENOUS ONCE
Status: DISCONTINUED | OUTPATIENT
Start: 2021-09-26 | End: 2021-09-26

## 2021-09-26 RX ORDER — NAPROXEN 250 MG/1
250 TABLET ORAL ONCE
Status: DISCONTINUED | OUTPATIENT
Start: 2021-09-26 | End: 2021-09-26

## 2021-09-26 RX ORDER — ONDANSETRON 4 MG/1
4 TABLET, ORALLY DISINTEGRATING ORAL EVERY 8 HOURS PRN
Status: DISCONTINUED | OUTPATIENT
Start: 2021-09-26 | End: 2021-09-29 | Stop reason: HOSPADM

## 2021-09-26 RX ORDER — ONDANSETRON 2 MG/ML
4 INJECTION INTRAMUSCULAR; INTRAVENOUS EVERY 6 HOURS PRN
Status: DISCONTINUED | OUTPATIENT
Start: 2021-09-26 | End: 2021-09-29 | Stop reason: HOSPADM

## 2021-09-26 RX ORDER — FENTANYL CITRATE 50 UG/ML
50 INJECTION, SOLUTION INTRAMUSCULAR; INTRAVENOUS
Status: DISCONTINUED | OUTPATIENT
Start: 2021-09-26 | End: 2021-09-26 | Stop reason: HOSPADM

## 2021-09-26 RX ORDER — TRAMADOL HYDROCHLORIDE 50 MG/1
50 TABLET ORAL EVERY 6 HOURS PRN
Status: DISCONTINUED | OUTPATIENT
Start: 2021-09-26 | End: 2021-09-29 | Stop reason: HOSPADM

## 2021-09-26 RX ORDER — LORAZEPAM 0.5 MG/1
0.5 TABLET ORAL NIGHTLY PRN
Status: DISCONTINUED | OUTPATIENT
Start: 2021-09-26 | End: 2021-09-29 | Stop reason: HOSPADM

## 2021-09-26 RX ADMIN — POTASSIUM CHLORIDE 10 MEQ: 750 TABLET, FILM COATED, EXTENDED RELEASE ORAL at 21:19

## 2021-09-26 RX ADMIN — VANCOMYCIN HYDROCHLORIDE 1500 MG: 5 INJECTION, POWDER, LYOPHILIZED, FOR SOLUTION INTRAVENOUS at 15:31

## 2021-09-26 RX ADMIN — Medication 10 ML: at 21:19

## 2021-09-26 RX ADMIN — KETOROLAC TROMETHAMINE 30 MG: 30 INJECTION, SOLUTION INTRAMUSCULAR at 15:31

## 2021-09-26 RX ADMIN — TRAMADOL HYDROCHLORIDE 50 MG: 50 TABLET, FILM COATED ORAL at 21:18

## 2021-09-26 RX ADMIN — ATENOLOL 25 MG: 25 TABLET ORAL at 21:19

## 2021-09-26 ASSESSMENT — ENCOUNTER SYMPTOMS
SINUS PRESSURE: 0
DIARRHEA: 0
COUGH: 0
ABDOMINAL PAIN: 1
BACK PAIN: 0
TROUBLE SWALLOWING: 0
SHORTNESS OF BREATH: 0
VOMITING: 0
CHEST TIGHTNESS: 0

## 2021-09-26 ASSESSMENT — PAIN DESCRIPTION - PAIN TYPE: TYPE: ACUTE PAIN

## 2021-09-26 ASSESSMENT — PAIN SCALES - GENERAL
PAINLEVEL_OUTOF10: 10
PAINLEVEL_OUTOF10: 7
PAINLEVEL_OUTOF10: 6

## 2021-09-26 ASSESSMENT — PAIN DESCRIPTION - ORIENTATION: ORIENTATION: LOWER

## 2021-09-26 ASSESSMENT — PAIN DESCRIPTION - LOCATION: LOCATION: ABDOMEN

## 2021-09-26 ASSESSMENT — PAIN DESCRIPTION - DESCRIPTORS: DESCRIPTORS: ACHING

## 2021-09-26 NOTE — ED PROVIDER NOTES
[facility] [facility]  eMERGENCY dEPARTMENT eNCOUnter      Pt Name: Addie North  MRN: [id number]  [place] 1980  Date of evaluation: 9/26/2021  Provider: Gurjit Lai, [address] [place] [address]       Chief Complaint   Patient presents with    Post-op Problem     Bleeding from abdominal hysterectomy site         HISTORY OF PRESENT ILLNESS   (Location/Symptom, Timing/Onset,Context/Setting, Quality, Duration, Modifying Factors, Severity)  Note limiting factors. Addie Notrh is a 37 y.o. female who presents to the emergency department with c/o bleeding and drainage from her abdominal hysterectomy site. On exam the patient does not have any bleeding at all however she does have purulent discharge. The area is red and on exam feels indurated. Patient denies any fever, chills or cough. Patient states the area hurts badly despite the medicine that she is taking to help with pain. Records review show that the patient was seen 2 days ago. Per that narrative the patient was prescribed Keflex after her surgery. She did not take the Keflex. She ended up coming to the ER for evaluation for infection and was advised to start taking the Keflex. The patient allegedly has been taking the Keflex but states that the pain is gotten worse and worse and she feels a fullness underneath the scar where her surgery had taken place. The history is provided by the patient. NursingNotes were reviewed. REVIEW OF SYSTEMS    (2-9 systems for level 4, 10 or more for level 5)     Review of Systems   Constitutional: Negative for activity change, appetite change, chills, fever and unexpected weight change. HENT: Negative for drooling, ear pain, nosebleeds, sinus pressure and trouble swallowing. Respiratory: Negative for cough, chest tightness and shortness of breath. Cardiovascular: Negative for chest pain and leg swelling. Gastrointestinal: Positive for abdominal pain.  Negative for diarrhea and vomiting. Endocrine: Negative for polydipsia and polyphagia. Genitourinary: Negative for dysuria, flank pain and frequency. Musculoskeletal: Negative for back pain and myalgias. Skin: Negative for pallor and rash. Neurological: Negative for syncope, weakness and headaches. Hematological: Does not bruise/bleed easily. All other systems reviewed and are negative. Except as noted above the remainder of the review of systems was reviewed and negative. PAST MEDICAL HISTORY     Past Medical History:   Diagnosis Date    Abnormal Pap smear of cervix     Breast disorder     Fibroid     Hypertension          SURGICALHISTORY       Past Surgical History:   Procedure Laterality Date    FOOT SURGERY      cyst on right foot as a child    HYSTERECTOMY, VAGINAL N/A 9/16/2021    LAPAROSCOPIC HYSTERECTOMY, BILATERAL SALPINGECTOMY, LAPAROTOMY ASSISTED MYOMECTOMY performed by Sis Natarajan MD at [address] [place] [address]  03/11/2020    Dr Phillips Click [phone]         CURRENT MEDICATIONS       Previous Medications    ACETAMINOPHEN (APAP EXTRA STRENGTH) 500 MG TABLET    Take 2 tablets by mouth every 6 hours as needed for Pain    ASCORBIC ACID (VITAMIN C) 500 MG CAPS    Take 1 tablet by mouth daily    ATENOLOL (TENORMIN) 25 MG TABLET    TAKE 1 TABLET BY MOUTH DAILY    CEPHALEXIN (KEFLEX) 500 MG CAPSULE    Take 1 capsule by mouth 4 times daily    CLINDAMYCIN (CLEOCIN) 2 % VAGINAL CREAM    Place vaginally nightly for 1 week prior to surgery    DOCUSATE SODIUM (COLACE) 100 MG CAPSULE    Take 1 capsule by mouth 2 times daily as needed for Constipation    GABAPENTIN (NEURONTIN) 600 MG TABLET    daily.      IBUPROFEN (ADVIL;MOTRIN) 800 MG TABLET    Take 1 tablet by mouth 3 times daily (with meals)    IBUPROFEN (ADVIL;MOTRIN) 800 MG TABLET    Take 1 tablet by mouth every 6 hours as needed for Pain    IBUPROFEN (IBU) 800 MG TABLET    Take 1 tablet by mouth every 8 hours as needed for Pain    OMEPRAZOLE (PRILOSEC) 20 MG DELAYED RELEASE CAPSULE    Take 20 mg by mouth Daily     ONDANSETRON (ZOFRAN) 4 MG TABLET    Take 1 tablet by mouth every 6 hours as needed for Nausea or Vomiting 1 tablet every 6 hrs prn for nausea and vomiting. POLYETHYLENE GLYCOL (MIRALAX) 17 G PACK PACKET    17 gram in am /  17 gram at 12 p day before surgery. POLYETHYLENE GLYCOL (MIRALAX) 17 GM/SCOOP POWDER    Take 17 g by mouth 2 times daily as needed (constipation)    POTASSIUM CHLORIDE (KLOR-CON) 10 MEQ EXTENDED RELEASE TABLET    10 mEq daily     SIMETHICONE (MYLICON) 80 MG CHEWABLE TABLET    Take 1 tablet by mouth 4 times daily as needed for Flatulence    TEMAZEPAM (RESTORIL) 30 MG CAPSULE    TAKE ONE CAPSULE BY MOUTH EVERY NIGHT AT BEDTIME AS NEEDED FOR SLEEP    TRAMADOL (ULTRAM) 50 MG TABLET    Take 1 tablet by mouth every 6 hours as needed for Pain for up to 5 days. TRAZODONE (DESYREL) 50 MG TABLET    Take 50 mg by mouth nightly       ALLERGIES     Metronidazole and Percocet [oxycodone-acetaminophen]    FAMILY HISTORY       Family History   Problem Relation Age of Onset    Diabetes Paternal Grandmother     Diabetes Father     Diabetes Paternal Aunt     Diabetes Paternal Uncle     Breast Cancer Neg Hx     Cancer Neg Hx     Colon Cancer Neg Hx     Eclampsia Neg Hx     Ovarian Cancer Neg Hx     Hypertension Neg Hx      Labor Neg Hx     Spont Abortions Neg Hx     Stroke Neg Hx           SOCIAL HISTORY       Social History     Socioeconomic History    Marital status: Single     Spouse name: None    Number of children: None    Years of education: None    Highest education level: None   Occupational History    None   Tobacco Use    Smoking status: Former Smoker     Quit date: 2012     Years since quittin.1    Smokeless tobacco: Never Used   Vaping Use    Vaping Use: Never used   Substance and Sexual Activity    Alcohol use: Yes     Comment: On \"occasion liquor\"    Drug use:  No  Sexual activity: Yes     Partners: Male     Comment: Condoms   Other Topics Concern    None   Social History Narrative    None     Social Determinants of Health     Financial Resource Strain:     Difficulty of Paying Living Expenses:    Food Insecurity:     Worried About Running Out of Food in the Last Year:     [address] in the Last Year:    Transportation Needs:     Lack of Transportation (Medical):  Lack of Transportation (Non-Medical):    Physical Activity:     Days of Exercise per Week:     Minutes of Exercise per Session:    Stress:     Feeling of Stress :    Social Connections:     Frequency of Communication with Friends and Family:     Frequency of Social Gatherings with Friends and Family:     Attends Spiritism Services:     Active Member of Clubs or Organizations:     Attends Club or Organization Meetings:     Marital Status:    Intimate Partner Violence:     Fear of Current or Ex-Partner:     Emotionally Abused:     Physically Abused:     Sexually Abused:        SCREENINGS      @FLOW([id number])@      PHYSICAL EXAM    (up to 7 for level 4, 8 or more for level 5)     ED Triage Vitals [09/26/21 1144]   BP Temp Temp Source Pulse Resp SpO2 Height Weight   (!) 153/94 98.2 °F (36.8 °C) Oral 79 18 96 % -- 275 lb (124.7 kg)       Physical Exam  Vitals and nursing note reviewed. Constitutional:       General: She is awake. She is not in acute distress. Appearance: Normal appearance. She is well-developed and normal weight. She is not ill-appearing, toxic-appearing or diaphoretic. Comments: No photophobia. No phonophobia. HENT:      Head: Normocephalic and atraumatic. No Denny's sign. Right Ear: External ear normal.      Left Ear: External ear normal.      Nose: Nose normal. No congestion or rhinorrhea. Mouth/Throat:      Mouth: Mucous membranes are moist.      Pharynx: Oropharynx is clear. No oropharyngeal exudate or posterior oropharyngeal erythema.    Eyes: General: No scleral icterus. Right eye: No foreign body or discharge. Left eye: No discharge. Extraocular Movements: Extraocular movements intact. Conjunctiva/sclera: Conjunctivae normal.      Left eye: No exudate. Pupils: Pupils are equal, round, and reactive to light. Neck:      Vascular: No JVD. Trachea: No tracheal deviation. Comments: No meningismus. Cardiovascular:      Rate and Rhythm: Normal rate and regular rhythm. Pulses: Normal pulses. Heart sounds: Normal heart sounds. Heart sounds not distant. No murmur heard. No friction rub. No gallop. Pulmonary:      Effort: Pulmonary effort is normal. No respiratory distress. Breath sounds: Normal breath sounds. No stridor. No wheezing, rhonchi or rales. Chest:      Chest wall: No tenderness. Abdominal:      General: Abdomen is flat. Bowel sounds are normal. There is no distension or abdominal bruit. There are no signs of injury. Palpations: Abdomen is soft. There is no shifting dullness, fluid wave, hepatomegaly, splenomegaly, mass or pulsatile mass. Tenderness: There is no abdominal tenderness. There is no right CVA tenderness, left CVA tenderness, guarding or rebound. Negative signs include Lopez's sign, Rovsing's sign and McBurney's sign. Hernia: No hernia is present. Musculoskeletal:         General: No swelling, tenderness, deformity or signs of injury. Normal range of motion. Cervical back: Normal range of motion and neck supple. No rigidity. Lymphadenopathy:      Head:      Right side of head: No submental adenopathy. Left side of head: No submental adenopathy. Skin:     General: Skin is warm and dry. Capillary Refill: Capillary refill takes less than 2 seconds. Coloration: Skin is not jaundiced or pale. Findings: No bruising, erythema, lesion or rash. Neurological:      General: No focal deficit present.       Mental Status: She is alert and oriented to person, place, and time. Mental status is at baseline. Cranial Nerves: No cranial nerve deficit. Sensory: No sensory deficit. Motor: No weakness. Coordination: Coordination normal.      Deep Tendon Reflexes: Reflexes are normal and symmetric. Psychiatric:         Mood and Affect: Mood normal.         Behavior: Behavior normal. Behavior is cooperative. Thought Content: Thought content normal.         Judgment: Judgment normal.         DIAGNOSTIC RESULTS     EKG: All EKG's are interpreted by the Emergency Department Physician who either signs or Co-signsthis chart in the absence of a cardiologist.        RADIOLOGY:   Hanna [place] such as CT, Ultrasound and MRI are read by the radiologist. Plain radiographic images are visualized and preliminarily interpreted by the emergency physician with the below findings:        Interpretation per the Radiologist below, if available at the time ofthis note:    [address]   Final Result      Nonspecific rounded hypoechoic lesion posterior to the bladder measuring up to 7.2 cm. Differential includes hematoma/seroma, versus abscess. No soft tissue collection visualized. ED BEDSIDE ULTRASOUND:   Performed by ED Physician - none    LABS:  Labs Reviewed   CBC WITH AUTO DIFFERENTIAL - Abnormal; Notable for the following components:       Result Value    RBC 4.10 (*)     Hemoglobin 11.7 (*)     Hematocrit 35.3 (*)     RDW 14.7 (*)     All other components within normal limits   C-REACTIVE PROTEIN - Abnormal; Notable for the following components:    .9 (*)     All other components within normal limits   CULTURE, ANAEROBIC AND AEROBIC   CULTURE, BLOOD 2   CULTURE, BLOOD 1   COMPREHENSIVE METABOLIC PANEL   PROCALCITONIN   LACTIC ACID, PLASMA       All other labs were within normal range or not returned as of this dictation.     EMERGENCY DEPARTMENT COURSE and DIFFERENTIAL DIAGNOSIS/MDM: Vitals:    Vitals:    09/26/21 1144   BP: (!) 153/94   Pulse: 79   Resp: 18   Temp: 98.2 °F (36.8 °C)   TempSrc: Oral   SpO2: 96%   Weight: 275 lb (124.7 kg)           MDM  Patient has what appears to be a postoperative abscess. She denies fever or chills. CRP is elevated. Patient started on Zosyn and Vanco after discussion with Dr. Luna Acuña ([facility]). Admit to Dr. John Segura service, Med surg. If ED attending can place transitional orders. NPO after 12AM.      Patient did not take Keflex after surgery. Is morbidly obese and has skin to skin contact because of obese abdomen/panus. Purulent appearing discharge. Blood cultures x 2. IV Zosyn/Vanco and [facility] Zoltan Stallworth) recommends 1500mg vanco IV. Re-examined patient, stable for floor. Wound culture obtained. CONSULTS:  IP CONSULT TO PHARMACY    PROCEDURES:  Unless otherwise noted below, none     Procedures    FINAL IMPRESSION      1. Post-operative wound abscess          DISPOSITION/PLAN   DISPOSITION Admitted 09/26/2021 03:33:26 PM      PATIENT REFERRED TO:  No follow-up provider specified.     DISCHARGE MEDICATIONS:  New Prescriptions    No medications on file          (Please note that portions of this note were completed with a voice recognition program.  Efforts were made to edit the dictations but occasionally words are mis-transcribed.)    Devang Ribeiro DO (electronically signed)  Attending Emergency Physician          Devang Ribeiro DO  09/26/21 [id number]

## 2021-09-26 NOTE — ED TRIAGE NOTES
Patient had total abdominal hysterectomy on 09/16. Patient states that she was here on 09/22 for post-op pain. Patient states at that time she was told area was red. Patient states last night she noticed blood on the band of her underwear. Patient states today when she was drying off after a shower she noticed blood on the towel. Patient reports that her neighbor came over and told her that she was bleeding from around her incision site.

## 2021-09-26 NOTE — ED NOTES
Pt awake, sitting in chair, watching tv, waiting for admission. Warm blanket given. Vanco infusing. Denies further needs.      Lindy Berrios RN  09/26/21 [id number]

## 2021-09-27 ENCOUNTER — APPOINTMENT (OUTPATIENT)
Dept: ULTRASOUND IMAGING | Age: 41
DRG: 721 | End: 2021-09-27
Payer: MEDICARE

## 2021-09-27 LAB
ANION GAP SERPL CALCULATED.3IONS-SCNC: 14 MEQ/L (ref 9–15)
BASOPHILS ABSOLUTE: 0 K/UL (ref 0–0.2)
BASOPHILS RELATIVE PERCENT: 0.4 %
BUN BLDV-MCNC: 10 MG/DL (ref 6–20)
CALCIUM SERPL-MCNC: 9.3 MG/DL (ref 8.5–9.9)
CHLORIDE BLD-SCNC: 107 MEQ/L (ref 95–107)
CO2: 21 MEQ/L (ref 20–31)
CREAT SERPL-MCNC: 0.72 MG/DL (ref 0.5–0.9)
EOSINOPHILS ABSOLUTE: 0.1 K/UL (ref 0–0.7)
EOSINOPHILS RELATIVE PERCENT: 1.8 %
GFR AFRICAN AMERICAN: >60
GFR NON-AFRICAN AMERICAN: >60
GLUCOSE BLD-MCNC: 94 MG/DL (ref 70–99)
HCT VFR BLD CALC: 33.2 % (ref 37–47)
HEMOGLOBIN: 11.3 G/DL (ref 12–16)
LYMPHOCYTES ABSOLUTE: 1.8 K/UL (ref 1–4.8)
LYMPHOCYTES RELATIVE PERCENT: 25.3 %
MCH RBC QN AUTO: 29.5 PG (ref 27–31.3)
MCHC RBC AUTO-ENTMCNC: 33.9 % (ref 33–37)
MCV RBC AUTO: 87.1 FL (ref 82–100)
MONOCYTES ABSOLUTE: 0.5 K/UL (ref 0.2–0.8)
MONOCYTES RELATIVE PERCENT: 6.5 %
NEUTROPHILS ABSOLUTE: 4.7 K/UL (ref 1.4–6.5)
NEUTROPHILS RELATIVE PERCENT: 66 %
PDW BLD-RTO: 14.4 % (ref 11.5–14.5)
PLATELET # BLD: 320 K/UL (ref 130–400)
POTASSIUM REFLEX MAGNESIUM: 4.1 MEQ/L (ref 3.4–4.9)
RBC # BLD: 3.82 M/UL (ref 4.2–5.4)
SODIUM BLD-SCNC: 142 MEQ/L (ref 135–144)
WBC # BLD: 7.1 K/UL (ref 4.8–10.8)

## 2021-09-27 PROCEDURE — 2580000003 HC RX 258: Performed by: NURSE PRACTITIONER

## 2021-09-27 PROCEDURE — 2580000003 HC RX 258: Performed by: OBSTETRICS & GYNECOLOGY

## 2021-09-27 PROCEDURE — 85025 COMPLETE CBC W/AUTO DIFF WBC: CPT

## 2021-09-27 PROCEDURE — 6360000002 HC RX W HCPCS: Performed by: OBSTETRICS & GYNECOLOGY

## 2021-09-27 PROCEDURE — 6360000002 HC RX W HCPCS: Performed by: NURSE PRACTITIONER

## 2021-09-27 PROCEDURE — 93970 EXTREMITY STUDY: CPT

## 2021-09-27 PROCEDURE — 6370000000 HC RX 637 (ALT 250 FOR IP): Performed by: OBSTETRICS & GYNECOLOGY

## 2021-09-27 PROCEDURE — 36415 COLL VENOUS BLD VENIPUNCTURE: CPT

## 2021-09-27 PROCEDURE — 1210000000 HC MED SURG R&B

## 2021-09-27 PROCEDURE — 80048 BASIC METABOLIC PNL TOTAL CA: CPT

## 2021-09-27 PROCEDURE — 99254 IP/OBS CNSLTJ NEW/EST MOD 60: CPT | Performed by: INTERNAL MEDICINE

## 2021-09-27 RX ADMIN — ATENOLOL 25 MG: 25 TABLET ORAL at 10:03

## 2021-09-27 RX ADMIN — Medication 10 ML: at 23:18

## 2021-09-27 RX ADMIN — Medication 10 ML: at 10:46

## 2021-09-27 RX ADMIN — POTASSIUM CHLORIDE 10 MEQ: 750 TABLET, FILM COATED, EXTENDED RELEASE ORAL at 10:03

## 2021-09-27 RX ADMIN — PANTOPRAZOLE SODIUM 40 MG: 40 TABLET, DELAYED RELEASE ORAL at 16:20

## 2021-09-27 RX ADMIN — TRAMADOL HYDROCHLORIDE 50 MG: 50 TABLET, FILM COATED ORAL at 05:59

## 2021-09-27 RX ADMIN — VANCOMYCIN HYDROCHLORIDE 1500 MG: 5 INJECTION, POWDER, LYOPHILIZED, FOR SOLUTION INTRAVENOUS at 12:05

## 2021-09-27 RX ADMIN — PANTOPRAZOLE SODIUM 40 MG: 40 TABLET, DELAYED RELEASE ORAL at 05:59

## 2021-09-27 RX ADMIN — PIPERACILLIN AND TAZOBACTAM 3375 MG: 3; .375 INJECTION, POWDER, LYOPHILIZED, FOR SOLUTION INTRAVENOUS at 16:20

## 2021-09-27 ASSESSMENT — ENCOUNTER SYMPTOMS
COLOR CHANGE: 1
RESPIRATORY NEGATIVE: 1
RECTAL PAIN: 0
EYES NEGATIVE: 1
ABDOMINAL PAIN: 1
VOMITING: 0

## 2021-09-27 ASSESSMENT — PAIN SCALES - GENERAL
PAINLEVEL_OUTOF10: 7
PAINLEVEL_OUTOF10: 4

## 2021-09-27 NOTE — H&P
Department of Gynecology  Attending History and Physical        CHIEF COMPLAINT:   Abdominal pain from incision site    Reason for Admission:  Possible intraabdominal abscess    History obtained from patient    HISTORY OF PRESENT ILLNESS:    Patient is a 38 Y/O female who presented to the emergency department with c/o bleeding, drainage and pain  from her abdominal hysterectomy site. She is S/P TOTAL LAPAROSCOPIC HYSTERECTOMY WITH PRESERVATION OF OVARIES with Fibroid abdominal morcellation through minilaparotomy on 2021.   Patient denies any fever, chills or night sweats. Past Medical History:        Diagnosis Date    Abnormal Pap smear of cervix     Breast disorder     Fibroid     Hypertension      Past Surgical History:        Procedure Laterality Date    FOOT SURGERY      cyst on right foot as a child    HYSTERECTOMY, VAGINAL N/A 2021    LAPAROSCOPIC HYSTERECTOMY, BILATERAL SALPINGECTOMY, LAPAROTOMY ASSISTED MYOMECTOMY performed by My Garcia MD at [address] [place] [address]  2020    Dr Byron Tejeda [phone]       Past Gynecological History:    1. Last menstrual period: unknown  2. Menses: age of menarche:  unknown years old  1. Contraception: Status post hysterectomy  4. Sexually transmitted disease history: none        A. Number of sexual partners in the last 6 months: unknown    5. Pap History: Patient does not recall the results of last Pap test.           6. Date of last mammogram: Patient has never had a mammogram.    OB History    Para Term  AB Living   0 0 0 0 0 0   SAB TAB Ectopic Molar Multiple Live Births   0 0 0 0 0 0       Medications Prior to Admission:   Medications Prior to Admission: traMADol (ULTRAM) 50 MG tablet, Take 1 tablet by mouth every 6 hours as needed for Pain for up to 5 days.   ibuprofen (ADVIL;MOTRIN) 800 MG tablet, Take 1 tablet by mouth every 6 hours as needed for Pain  acetaminophen (APAP EXTRA STRENGTH) 500 MG tablet, Take 2 tablets by mouth every 6 hours as needed for Pain  cephALEXin (KEFLEX) 500 MG capsule, Take 1 capsule by mouth 4 times daily  Ascorbic Acid (VITAMIN C) 500 MG CAPS, Take 1 tablet by mouth daily  atenolol (TENORMIN) 25 MG tablet, TAKE 1 TABLET BY MOUTH DAILY  gabapentin (NEURONTIN) 600 MG tablet, Take 600 mg by mouth 2 times daily as needed. potassium chloride (KLOR-CON) 10 MEQ extended release tablet, 10 mEq daily   temazepam (RESTORIL) 30 MG capsule, TAKE ONE CAPSULE BY MOUTH EVERY NIGHT AT BEDTIME AS NEEDED FOR SLEEP  omeprazole (PRILOSEC) 20 MG delayed release capsule, Take 20 mg by mouth Daily   [DISCONTINUED] simethicone (MYLICON) 80 MG chewable tablet, Take 1 tablet by mouth 4 times daily as needed for Flatulence  [DISCONTINUED] polyethylene glycol (MIRALAX) 17 GM/SCOOP powder, Take 17 g by mouth 2 times daily as needed (constipation)  [DISCONTINUED] ondansetron (ZOFRAN) 4 MG tablet, Take 1 tablet by mouth every 6 hours as needed for Nausea or Vomiting 1 tablet every 6 hrs prn for nausea and vomiting. [DISCONTINUED] docusate sodium (COLACE) 100 MG capsule, Take 1 capsule by mouth 2 times daily as needed for Constipation  [DISCONTINUED] clindamycin (CLEOCIN) 2 % vaginal cream, Place vaginally nightly for 1 week prior to surgery  [DISCONTINUED] polyethylene glycol (MIRALAX) 17 g PACK packet, 17 gram in am /  17 gram at 12 p day before surgery. [DISCONTINUED] ibuprofen (ADVIL;MOTRIN) 800 MG tablet, Take 1 tablet by mouth 3 times daily (with meals)  [DISCONTINUED] traZODone (DESYREL) 50 MG tablet, Take 50 mg by mouth nightly  [DISCONTINUED] ibuprofen (IBU) 800 MG tablet, Take 1 tablet by mouth every 8 hours as needed for Pain    Allergies:  Metronidazole and Percocet [oxycodone-acetaminophen]     Social History:  TOBACCO:   reports that she quit smoking about 9 years ago. She has never used smokeless tobacco.  ETOH:   reports current alcohol use.   DRUGS:   reports no history of drug use.  SEXUAL HISTORY:    DOMESTIC VIOLENCE:  no  ACTIVITIES OF DAILY LIVING:    INSTRUMENTAL ACTIVITIES OF DAILY LIVING:  Patient is able to perform all instrumental activities of daily living. MARITAL STATUS:    OCCUPATION:    LEVEL OF EDUCATION:    Patient currently lives with family   Environmental/chemical exposure:  None  Travel History:  None    Family History:       Problem Relation Age of Onset    Diabetes Paternal Grandmother     Diabetes Father     Diabetes Paternal Aunt     Diabetes Paternal Uncle     Breast Cancer Neg Hx     Cancer Neg Hx     Colon Cancer Neg Hx     Eclampsia Neg Hx     Ovarian Cancer Neg Hx     Hypertension Neg Hx      Labor Neg Hx     Spont Abortions Neg Hx     Stroke Neg Hx        REVIEW OF SYSTEMS:      Constitutional:  negative  Eyes:  negative  Ears, nose, mouth, throat, and face:  negative  Respiratory:  negative  Cardiovascular:  negative  Gastrointestinal: Abdominal wall incision   Genitourinary:  negative  Integument/breast:  negative  Hematologic/lymphatic:  negative  Allergic/Immunologic:  negative  Endocrine:  negative  Musculoskeletal:  positive for  pain  Neurological:  negative  Behavior/Psych:  negative    PHYSICAL EXAM:    Vitals:  BP (!) 143/79   Pulse 80   Temp 98.3 °F (36.8 °C) (Oral)   Resp 20   Wt 275 lb (124.7 kg)   LMP  (LMP Unknown)   SpO2 99%   BMI 47.20 kg/m²     LUNGS:  No increased work of breathing, good air exchange, clear to auscultation bilaterally, no crackles or wheezing  CARDIOVASCULAR:  Normal apical impulse, regular rate and rhythm, normal S1 and S2, no S3 or S4, and no murmur noted  ABDOMEN:  No bleeding but purulent discharge with erythema and induration     DATA:    SOFT TISSUE ULTRASOUND:  Narrative   EXAMINATION: US SOFT TISSUE LIMITED AREA       HISTORY:   Recent .  Concern for abscess.       TECHNIQUE: Grayscale and power Doppler ultrasound imaging was performed in the area of concern and images were saved to the permanent image archive.       Comparison: CT 1/11/2021. Ultrasound from 2/8/2020.       RESULT:       Limited imaging performed of the midline pelvis at the area of incision. There is no distinct loculated soft tissue collection. Within the pelvis, deep to the bladder, there is a hypoechoic rounded area measuring 5.6 x 7.2 x 4.0 cm.               Impression       Nonspecific rounded hypoechoic lesion posterior to the bladder measuring up to 7.2 cm. Differential includes hematoma/seroma, versus abscess.       No soft tissue collection visualized     Labs:    CBC:   Lab Results   Component Value Date    WBC 7.4 09/26/2021    RBC 4.10 09/26/2021    HGB 11.7 09/26/2021    HCT 35.3 09/26/2021    MCV 86.1 09/26/2021    RDW 14.7 09/26/2021     09/26/2021     BMP:    Lab Results   Component Value Date     09/26/2021    K 3.8 09/26/2021    K 3.8 03/12/2020     09/26/2021    CO2 26 09/26/2021    BUN 10 09/26/2021       ASSESSMENT AND PLAN:      Active Problems:    Post-operative wound abscess vs hematoma/seroma  Plan:   1) Admit to Med Surg  2) IV antibiotics and pain management  3) Consider IR drainage    Gal Bella MBA, FACOOG  September 27, 2021 12:26 AM

## 2021-09-27 NOTE — CONSULTS
Infectious Disease     Patient Name: Jay Acevedo  Date: 2021  YOB: 1980  Medical Record Number: [id number]        Intra-abdominal abscess  Postop infection        History of Present Illness:  Hypertension      Recent hysterectomy  2021    UTERINE LEIOMYOMA, 20 wk sized uterus   Procedure(s):  TOTAL LAPAROSCOPIC HYSTERECTOMY WITH PRESERVATION OF OVARIES, POSSIBLE LAPAROTOMY  Fibroid abdominal morcellation through minilaparotomy      Presented to emergency department with bleeding drainage  Pain purulent in the emergency room  And swelling of the wound itself   pain from abdominal hysterectomy site incision no fevers chills sweats  Complaining of significant pain in abdomen  Patient was taking Keflex postoperative    Ultrasound showed evidence of fluid collection consistent with intra-abdominal abscess    EXAMINATION: US SOFT TISSUE LIMITED AREA       HISTORY:   Recent . Concern for abscess.       TECHNIQUE: Grayscale and power Doppler ultrasound imaging was performed in the area of concern and images were saved to the permanent image archive.       Comparison: CT 2021. Ultrasound from 2020.       RESULT:       Limited imaging performed of the midline pelvis at the area of incision. There is no distinct loculated soft tissue collection. Within the pelvis, deep to the bladder, there is a hypoechoic rounded area measuring 5.6 x 7.2 x 4.0 cm.               Impression       Nonspecific rounded hypoechoic lesion posterior to the bladder measuring up to 7.2 cm. Differential includes hematoma/seroma, versus abscess. Review of Systems   Constitutional: Negative for chills, diaphoresis and fatigue. HENT: Negative. Eyes: Negative. Respiratory: Negative. Cardiovascular: Negative. Gastrointestinal: Positive for abdominal pain. Negative for rectal pain and vomiting. Endocrine: Negative. Genitourinary: Negative. Musculoskeletal: Negative.     Skin: Positive for color change and wound. Neurological: Negative. Review of Systems: All 14 review of systems negative other than as stated above    Social History     Tobacco Use    Smoking status: Former Smoker     Quit date: 2012     Years since quittin.1    Smokeless tobacco: Never Used   Vaping Use    Vaping Use: Never used   Substance Use Topics    Alcohol use: Yes     Comment: On \"occasion liquor\"    Drug use: No         Past Medical History:   Diagnosis Date    Abnormal Pap smear of cervix     Breast disorder     Fibroid     Hypertension            Past Surgical History:   Procedure Laterality Date    FOOT SURGERY      cyst on right foot as a child    HYSTERECTOMY, VAGINAL N/A 2021    LAPAROSCOPIC HYSTERECTOMY, BILATERAL SALPINGECTOMY, LAPAROTOMY ASSISTED MYOMECTOMY performed by Kanwal Wang MD at [facility]  2020    Dr Abilio Coleman [phone]         No current facility-administered medications on file prior to encounter. Current Outpatient Medications on File Prior to Encounter   Medication Sig Dispense Refill    traMADol (ULTRAM) 50 MG tablet Take 1 tablet by mouth every 6 hours as needed for Pain for up to 5 days. 20 tablet 0    ibuprofen (ADVIL;MOTRIN) 800 MG tablet Take 1 tablet by mouth every 6 hours as needed for Pain 120 tablet 3    acetaminophen (APAP EXTRA STRENGTH) 500 MG tablet Take 2 tablets by mouth every 6 hours as needed for Pain 60 tablet 1    cephALEXin (KEFLEX) 500 MG capsule Take 1 capsule by mouth 4 times daily 28 capsule 0    Ascorbic Acid (VITAMIN C) 500 MG CAPS Take 1 tablet by mouth daily      atenolol (TENORMIN) 25 MG tablet TAKE 1 TABLET BY MOUTH DAILY      gabapentin (NEURONTIN) 600 MG tablet Take 600 mg by mouth 2 times daily as needed.        potassium chloride (KLOR-CON) 10 MEQ extended release tablet 10 mEq daily       temazepam (RESTORIL) 30 MG capsule TAKE ONE CAPSULE BY MOUTH EVERY NIGHT AT BEDTIME AS NEEDED FOR SLEEP      omeprazole (PRILOSEC) 20 MG delayed release capsule Take 20 mg by mouth Daily          Allergies   Allergen Reactions    Metronidazole Hives    Percocet [Oxycodone-Acetaminophen] Hives         Family History   Problem Relation Age of Onset    Diabetes Paternal Grandmother     Diabetes Father     Diabetes Paternal Aunt     Diabetes Paternal Uncle     Breast Cancer Neg Hx     Cancer Neg Hx     Colon Cancer Neg Hx     Eclampsia Neg Hx     Ovarian Cancer Neg Hx     Hypertension Neg Hx      Labor Neg Hx     Spont Abortions Neg Hx     Stroke Neg Hx          Physical Exam:      Physical Exam  Constitutional:       General: She is not in acute distress. Appearance: Normal appearance. She is obese. She is not ill-appearing. HENT:      Head: Atraumatic. Eyes:      Extraocular Movements: Extraocular movements intact. Pupils: Pupils are equal, round, and reactive to light. Cardiovascular:      Heart sounds: Normal heart sounds. No murmur heard. Pulmonary:      Effort: Pulmonary effort is normal. No respiratory distress. Breath sounds: Normal breath sounds. No wheezing, rhonchi or rales. Chest:      Chest wall: No tenderness. Abdominal:      General: Abdomen is flat. Bowel sounds are normal. There is no distension. Tenderness: There is abdominal tenderness. There is no right CVA tenderness, left CVA tenderness or guarding. Hernia: No hernia is present. Musculoskeletal:         General: No swelling, deformity or signs of injury. Cervical back: Normal range of motion and neck supple. No rigidity or tenderness. Left lower leg: No edema. Skin:     General: Skin is warm. Neurological:      Mental Status: She is alert. Blood pressure 130/69, pulse 75, temperature 98.3 °F (36.8 °C), temperature source Oral, resp. rate 20, height 5' 4\" (1.626 m), weight 275 lb (124.7 kg), SpO2 99 %.       .   Lab Results   Component Value Date    WBC 7.4 09/26/2021    HGB 11.7 (L) 09/26/2021    HCT 35.3 (L) 09/26/2021    MCV 86.1 09/26/2021     09/26/2021     Lab Results   Component Value Date     09/26/2021    K 3.8 09/26/2021    K 3.8 03/12/2020     09/26/2021    CO2 26 09/26/2021    BUN 10 09/26/2021    CREATININE 0.60 09/26/2021    GLUCOSE 92 09/26/2021    CALCIUM 8.8 09/26/2021      Culture, Anaerobic and Aerobic [[id number]] (Abnormal) Collected: 09/26/21 1311   Order Status: Completed Specimen: Abdomen Updated: 09/27/21 1131    Gram Stain Result Few WBC's   Few Gram negative rods     Anaerobic Culture Culture in progress    Organism Gram negative rodAbnormal     WOUND/ABSCESS --    Light growth   ID and sensitivity to follow     Organism Gram negative rodAbnormal                ASSESSMENT:  Patient Active Problem List   Diagnosis    Abdominal pain    Fibroid, uterine    Bulky or enlarged uterus    Fibroid uterus    Post-operative wound abscess    Postoperative intra-abdominal abscess         PLAN:    Intra-abdominal abscess  Postop infection    Interventional radiology has been consulted    Cultures from wound drainage show gram-negative uma    Agree with vancomycin and Zosyn

## 2021-09-27 NOTE — PROGRESS NOTES
Pt assessment and vitals complete and stable. Patient up independently, npo with sips of meds. Dr. Alexandra Farooq called, new orders received and entered. Hospitalist and Interventional Radiology, and pharmacy for [facility] to be restarted. Per Dr. Alexandra Farooq, the hospitalist can decide if Infectious Disease needs consulted. All orders entered. Dressing in place, small amount of old drainage noted.  Denies needs

## 2021-09-27 NOTE — CONSULTS
Consult Note    Reason for Consult: medical management     Requesting Physician:  Katie Bunch MD    HISTORY OF PRESENT ILLNESS:    The patient is a [address] y.[address]. female who admitted under the gynecology service for possible intra-abdominal abscess post total laparoscopic hysterectomy. Initially underwent hysterectomy on 9/15/2021 and was discharged on 9/17/2021. Presented to the ED on postop day 8 with increasing dull abdominal pain along her lower incision. Initially concern for possible cellulitis as patient quit taking her Keflex as ordered. She was medicated for pain and given another prescription for Keflex and instructed to follow-up with gynecology within 1 week. Presented to the ED yesterday with complaints of bleeding and drainage from her abdominal hysterectomy site. Area was found to be reddened and swollen upon exam with possible induration and purulent drainage. She was given broad-spectrum antibiotics in the ED. Ultrasound was completed and was concerning for hematoma/seroma versus abscess. Upon exam, denies CP, SOB, N/V/D. Reports mild abdominal tenderness to LLQ. Reports last bowel movement two days prior. Denies issue with eating and drinking. We are consulted to assist with medical management. Past Medical History:   Diagnosis Date    Abnormal Pap smear of cervix     Breast disorder     Fibroid     Hypertension        Past Surgical History:   Procedure Laterality Date    FOOT SURGERY      cyst on right foot as a child    HYSTERECTOMY, VAGINAL N/A 9/16/2021    LAPAROSCOPIC HYSTERECTOMY, BILATERAL SALPINGECTOMY, LAPAROTOMY ASSISTED MYOMECTOMY performed by Katie Bunch MD at [address] [place] [address]  03/11/2020    Dr Emilia Merritt [phone]       Prior to Admission medications    Medication Sig Start Date End Date Taking? Authorizing Provider   traMADol (ULTRAM) 50 MG tablet Take 1 tablet by mouth every 6 hours as needed for Pain for up to 5 days.  9/24/21 9/29/21 Yes Misael Celis,    ibuprofen (ADVIL;MOTRIN) 800 MG tablet Take 1 tablet by mouth every 6 hours as needed for Pain 9/16/21  Yes Loida Elaine MD   acetaminophen (APAP EXTRA STRENGTH) 500 MG tablet Take 2 tablets by mouth every 6 hours as needed for Pain 9/16/21  Yes Loida Elaine MD   cephALEXin (KEFLEX) 500 MG capsule Take 1 capsule by mouth 4 times daily 9/16/21  Yes She Casas MD   Ascorbic Acid (VITAMIN C) 500 MG CAPS Take 1 tablet by mouth daily   Yes Historical Provider, MD   atenolol (TENORMIN) 25 MG tablet TAKE 1 TABLET BY MOUTH DAILY 2/5/21  Yes Historical Provider, MD   gabapentin (NEURONTIN) 600 MG tablet Take 600 mg by mouth 2 times daily as needed.   3/1/21  Yes Historical Provider, MD   potassium chloride (KLOR-CON) 10 MEQ extended release tablet 10 mEq daily  2/26/21  Yes Historical Provider, MD   temazepam (RESTORIL) 30 MG capsule TAKE ONE CAPSULE BY MOUTH EVERY NIGHT AT BEDTIME AS NEEDED FOR SLEEP 12/10/20  Yes Historical Provider, MD   omeprazole (PRILOSEC) 20 MG delayed release capsule Take 20 mg by mouth Daily  9/17/19 9/26/21 Yes Historical Provider, MD       Scheduled Meds:   vancomycin  1,500 mg IntraVENous Q12H    vancomycin (VANCOCIN) intermittent dosing (placeholder)   Other RX Placeholder    sodium chloride flush  5-40 mL IntraVENous 2 times per day    atenolol  25 mg Oral Daily    pantoprazole  40 mg Oral BID AC    potassium chloride  10 mEq Oral Daily     Continuous Infusions:   sodium chloride       PRN Meds:sodium chloride flush, sodium chloride, acetaminophen, ondansetron **OR** ondansetron, gabapentin, LORazepam, traMADol    Allergies   Allergen Reactions    Metronidazole Hives    Percocet [Oxycodone-Acetaminophen] Hives       Social History     Socioeconomic History    Marital status: Single     Spouse name: Not on file    Number of children: Not on file    Years of education: Not on file    Highest education level: Not on file   Occupational History    Not on file Tobacco Use    Smoking status: Former Smoker     Quit date: 2012     Years since quittin.1    Smokeless tobacco: Never Used   Vaping Use    Vaping Use: Never used   Substance and Sexual Activity    Alcohol use: Yes     Comment: On \"occasion liquor\"    Drug use: No    Sexual activity: Yes     Partners: Male     Comment: Condoms   Other Topics Concern    Not on file   Social History Narrative    Not on file     Social Determinants of Health     Financial Resource Strain:     Difficulty of Paying Living Expenses:    Food Insecurity:     Worried About Running Out of Food in the Last Year:     [address] in the Last Year:    Transportation Needs:     Lack of Transportation (Medical):      Lack of Transportation (Non-Medical):    Physical Activity:     Days of Exercise per Week:     Minutes of Exercise per Session:    Stress:     Feeling of Stress :    Social Connections:     Frequency of Communication with Friends and Family:     Frequency of Social Gatherings with Friends and Family:     Attends [employer] Services:     Active Member of Clubs or Organizations:     Attends Club or Organization Meetings:     Marital Status:    Intimate Partner Violence:     Fear of Current or Ex-Partner:     Emotionally Abused:     Physically Abused:     Sexually Abused:        Family History   Problem Relation Age of Onset    Diabetes Paternal Grandmother     Diabetes Father     Diabetes Paternal Aunt     Diabetes Paternal Uncle     Breast Cancer Neg Hx     Cancer Neg Hx     Colon Cancer Neg Hx     Eclampsia Neg Hx     Ovarian Cancer Neg Hx     Hypertension Neg Hx      Labor Neg Hx     Spont Abortions Neg Hx     Stroke Neg Hx        Review Of Systems:   12 point ROS negative unless indicated below or in the HPI    Physical Exam:  Vitals:    21 1536 21 1931 21 0039 21 1003   BP: 132/80 (!) 143/79  130/69   Pulse: 79 80  75   Resp: 16 20     Temp:  98.3 °F (36.8 °C)     TempSrc:  Oral     SpO2: 98% 99%     Weight:   275 lb (124.7 kg)    Height:   5' 4\" (1.626 m)        CONSTITUTIONAL:  awake, alert, cooperative, no apparent distress, and appears stated age  NECK:  Supple, symmetrical, trachea midline  LUNGS:  Clear to auscultation bilaterally, no crackles or wheezing  CARDIOVASCULAR: Regular rate and rhythm, normal S1 and S2. 2+ BLE non-pitting  ABDOMEN:  Obese, Dressing saturated with purulent drainage. No bleeding. MUSCULOSKELETAL:  There is no redness, warmth, or swelling of the joints. NEUROLOGIC:  Awake, alert, oriented to name, place and time. SKIN: Abd warm with erythema pannus otherwise warm and dry    Labs:  No results found for this or any previous visit (from the past 24 hour(s)). Assessment/Plan:  1. Postop wound infection/intra abdominal abscess; s/p laparoscopic hysterectomy. Failed outpatient treatment versus non-compliance. Gynecology managing. Recommend add Zosyn to antibiotic regimen. ID consulted. Monitor labs daily. Pain management. Cultures are pending. IR consulted for possible drain tube. 2. Anemia: Mild. Likely post-op related. Follow H&H  3. Hypertension: Stable. Continue atenolol. 4. Localized edema: 2+ nonpitting edema to bilateral lower extremities. Giving recent surgery recommend ultrasound of bilateral lower extremities to rule out DVT (ordered). 5. GERD: PPI  6. DVT prophylaxis: Per primary team  7. Noncompliance with medications:  on importance adherence to medications and need for control of chronic illnesses to decrease morbidity and mortality    Thank you for allowing me to participate in the care of this patient.      Electronically signed by MARIANGEL Nash NP on 9/27/21 at 1:45 PM EDT

## 2021-09-27 NOTE — PROGRESS NOTES
Per Dr. Harry Carey ok for patient to eat dinner and be NPO at midnight except sips of water with meds, and will reassess plan in the morning.

## 2021-09-28 ENCOUNTER — APPOINTMENT (OUTPATIENT)
Dept: CT IMAGING | Age: 41
DRG: 721 | End: 2021-09-28
Payer: MEDICARE

## 2021-09-28 LAB
ANION GAP SERPL CALCULATED.3IONS-SCNC: 13 MEQ/L (ref 9–15)
BASOPHILS ABSOLUTE: 0.1 K/UL (ref 0–0.2)
BASOPHILS RELATIVE PERCENT: 0.8 %
BUN BLDV-MCNC: 9 MG/DL (ref 6–20)
CALCIUM SERPL-MCNC: 9.2 MG/DL (ref 8.5–9.9)
CHLORIDE BLD-SCNC: 103 MEQ/L (ref 95–107)
CO2: 22 MEQ/L (ref 20–31)
CREAT SERPL-MCNC: 0.74 MG/DL (ref 0.5–0.9)
EOSINOPHILS ABSOLUTE: 0.2 K/UL (ref 0–0.7)
EOSINOPHILS RELATIVE PERCENT: 2.7 %
GFR AFRICAN AMERICAN: >60
GFR NON-AFRICAN AMERICAN: >60
GLUCOSE BLD-MCNC: 107 MG/DL (ref 60–115)
GLUCOSE BLD-MCNC: 108 MG/DL (ref 70–99)
HCT VFR BLD CALC: 33.8 % (ref 37–47)
HEMOGLOBIN: 11.2 G/DL (ref 12–16)
LYMPHOCYTES ABSOLUTE: 1.5 K/UL (ref 1–4.8)
LYMPHOCYTES RELATIVE PERCENT: 20.9 %
MCH RBC QN AUTO: 28.5 PG (ref 27–31.3)
MCHC RBC AUTO-ENTMCNC: 33.2 % (ref 33–37)
MCV RBC AUTO: 85.8 FL (ref 82–100)
MONOCYTES ABSOLUTE: 0.5 K/UL (ref 0.2–0.8)
MONOCYTES RELATIVE PERCENT: 6.5 %
NEUTROPHILS ABSOLUTE: 4.8 K/UL (ref 1.4–6.5)
NEUTROPHILS RELATIVE PERCENT: 69.1 %
PDW BLD-RTO: 13.9 % (ref 11.5–14.5)
PERFORMED ON: NORMAL
PLATELET # BLD: 340 K/UL (ref 130–400)
POTASSIUM REFLEX MAGNESIUM: 4.2 MEQ/L (ref 3.4–4.9)
RBC # BLD: 3.93 M/UL (ref 4.2–5.4)
SODIUM BLD-SCNC: 138 MEQ/L (ref 135–144)
VANCOMYCIN RANDOM: 14.3 UG/ML (ref 10–40)
WBC # BLD: 7 K/UL (ref 4.8–10.8)

## 2021-09-28 PROCEDURE — 72192 CT PELVIS W/O DYE: CPT

## 2021-09-28 PROCEDURE — 6370000000 HC RX 637 (ALT 250 FOR IP): Performed by: INTERNAL MEDICINE

## 2021-09-28 PROCEDURE — 6370000000 HC RX 637 (ALT 250 FOR IP): Performed by: OBSTETRICS & GYNECOLOGY

## 2021-09-28 PROCEDURE — 99232 SBSQ HOSP IP/OBS MODERATE 35: CPT | Performed by: INTERNAL MEDICINE

## 2021-09-28 PROCEDURE — 72192 CT PELVIS W/O DYE: CPT | Performed by: RADIOLOGY

## 2021-09-28 PROCEDURE — 85025 COMPLETE CBC W/AUTO DIFF WBC: CPT

## 2021-09-28 PROCEDURE — 99253 IP/OBS CNSLTJ NEW/EST LOW 45: CPT | Performed by: RADIOLOGY

## 2021-09-28 PROCEDURE — 1210000000 HC MED SURG R&B

## 2021-09-28 PROCEDURE — 6360000002 HC RX W HCPCS: Performed by: NURSE PRACTITIONER

## 2021-09-28 PROCEDURE — 80048 BASIC METABOLIC PNL TOTAL CA: CPT

## 2021-09-28 PROCEDURE — 80202 ASSAY OF VANCOMYCIN: CPT

## 2021-09-28 PROCEDURE — 6360000002 HC RX W HCPCS: Performed by: OBSTETRICS & GYNECOLOGY

## 2021-09-28 PROCEDURE — 2580000003 HC RX 258: Performed by: OBSTETRICS & GYNECOLOGY

## 2021-09-28 PROCEDURE — 36415 COLL VENOUS BLD VENIPUNCTURE: CPT

## 2021-09-28 PROCEDURE — 2580000003 HC RX 258: Performed by: NURSE PRACTITIONER

## 2021-09-28 RX ORDER — L. ACIDOPHILUS/L.BULGARICUS 1MM CELL
4 TABLET ORAL 3 TIMES DAILY
Status: DISCONTINUED | OUTPATIENT
Start: 2021-09-28 | End: 2021-09-29 | Stop reason: HOSPADM

## 2021-09-28 RX ADMIN — PIPERACILLIN AND TAZOBACTAM 3375 MG: 3; .375 INJECTION, POWDER, LYOPHILIZED, FOR SOLUTION INTRAVENOUS at 03:06

## 2021-09-28 RX ADMIN — PIPERACILLIN AND TAZOBACTAM 3375 MG: 3; .375 INJECTION, POWDER, LYOPHILIZED, FOR SOLUTION INTRAVENOUS at 18:57

## 2021-09-28 RX ADMIN — VANCOMYCIN HYDROCHLORIDE 1500 MG: 5 INJECTION, POWDER, LYOPHILIZED, FOR SOLUTION INTRAVENOUS at 12:45

## 2021-09-28 RX ADMIN — Medication 4 TABLET: at 16:02

## 2021-09-28 RX ADMIN — Medication 10 ML: at 08:14

## 2021-09-28 RX ADMIN — POTASSIUM CHLORIDE 10 MEQ: 750 TABLET, FILM COATED, EXTENDED RELEASE ORAL at 08:15

## 2021-09-28 RX ADMIN — PIPERACILLIN AND TAZOBACTAM 3375 MG: 3; .375 INJECTION, POWDER, LYOPHILIZED, FOR SOLUTION INTRAVENOUS at 10:27

## 2021-09-28 RX ADMIN — PANTOPRAZOLE SODIUM 40 MG: 40 TABLET, DELAYED RELEASE ORAL at 16:02

## 2021-09-28 RX ADMIN — Medication 4 TABLET: at 21:24

## 2021-09-28 RX ADMIN — VANCOMYCIN HYDROCHLORIDE 1500 MG: 5 INJECTION, POWDER, LYOPHILIZED, FOR SOLUTION INTRAVENOUS at 00:17

## 2021-09-28 RX ADMIN — PANTOPRAZOLE SODIUM 40 MG: 40 TABLET, DELAYED RELEASE ORAL at 06:13

## 2021-09-28 RX ADMIN — ATENOLOL 25 MG: 25 TABLET ORAL at 08:15

## 2021-09-28 ASSESSMENT — ENCOUNTER SYMPTOMS
VOMITING: 0
ABDOMINAL PAIN: 1
RESPIRATORY NEGATIVE: 1
RECTAL PAIN: 0

## 2021-09-28 ASSESSMENT — PAIN SCALES - GENERAL: PAINLEVEL_OUTOF10: 6

## 2021-09-28 NOTE — PROGRESS NOTES
Hospitalist Progress Note      PCP: Donald Piedra    Date of Admission: 9/26/2021    Chief Complaint:    Chief Complaint   Patient presents with    Post-op Problem     Bleeding from abdominal hysterectomy site     Subjective:  Patient denies fevers, chills, sweats, CP, SOB. 12 point ROS negative other than mentioned above     Medications:  Reviewed    Infusion Medications    sodium chloride       Scheduled Medications    vancomycin  1,500 mg IntraVENous Q12H    vancomycin (VANCOCIN) intermittent dosing (placeholder)   Other RX Placeholder    piperacillin-tazobactam  3,375 mg IntraVENous Q8H    sodium chloride flush  5-40 mL IntraVENous 2 times per day    atenolol  25 mg Oral Daily    pantoprazole  40 mg Oral BID AC    potassium chloride  10 mEq Oral Daily     PRN Meds: sodium chloride flush, sodium chloride, acetaminophen, ondansetron **OR** ondansetron, gabapentin, LORazepam, traMADol      Intake/Output Summary (Last 24 hours) at 9/28/2021 1154  Last data filed at 9/28/2021 0615  Gross per 24 hour   Intake 1265 ml   Output --   Net 1265 ml     Exam:    /70   Pulse 80   Temp 99.1 °F (37.3 °C) (Oral)   Resp 16   Ht 5' 4\" (1.626 m)   Wt 275 lb (124.7 kg)   LMP  (LMP Unknown)   SpO2 98%   BMI 47.20 kg/m²     General appearance: No apparent distress, appears stated age and cooperative. HEENT: Conjunctivae/corneas clear. Neck: Trachea midline. Respiratory:  Normal respiratory effort. Clear to auscultation  Cardiovascular: Regular rate and rhythm   Abdomen: Soft, non-tender, non-distended with normal bowel sounds. Musculoskeletal: No clubbing, cyanosis or edema bilaterally.   Neuro: Non Focal.  Capillary Refill: Brisk,< 3 seconds   Peripheral Pulses: +2 palpable, equal bilaterally     Labs:   Recent Labs     09/26/21  1300 09/27/21  1450 09/28/21  0909   WBC 7.4 7.1 7.0   HGB 11.7* 11.3* 11.2*   HCT 35.3* 33.2* 33.8*    320 340     Recent Labs     09/26/21  1300 09/27/21  1450 managing a portion of pt care. Some medical issues are handled by other specialists. Additional work up and treatment should be done in out pt setting by pt PCP and other out pt providers. In addition to examining and evaluating pt, I spent additional time explaining care, normal and abnormal findings, and treatment plan. All of pt questions were answered. Counseling, diet and education were  provided. Case will be discussed with nursing staff when appropriate. Family will be updated if and when appropriate. Diet: ADULT DIET;  Regular    Code Status: Full Code    PT/OT Eval           Electronically signed by Dawit Salter MD on 9/28/2021 at 11:54 AM

## 2021-09-28 NOTE — PROGRESS NOTES
Spiritual Care Services     Summary of Visit:      Spiritual Assessment/Intervention/Outcomes:    Encounter Summary  Services provided to[de-identified] Patient  Referral/Consult From[de-identified] Rounding  Support System: Family members  Continue Visiting: No  Complexity of Encounter: Low  Length of Encounter: 15 minutes  Spiritual Assessment Completed: Yes  Routine  Type: Initial  Assessment: Calm, Approachable  Intervention: [place], Sustaining presence/ Ministry of presence  Outcome: Receptive                               Care Plan:        Spiritual Care Services   Electronically signed by Vanesa Jenkins on 9/28/21 at 1:27 PM EDT     To reach a  for emotional and spiritual support, place an [facility] consult request.   If a  is needed immediately, dial 0 and ask to page the on-call .

## 2021-09-28 NOTE — FLOWSHEET NOTE
Assesement completed. VS WNL. Abdominal sounds active; drsg. Clean, dry, intact. Denies pain; up independent in room. Denies further needs at the moment.  Will continue to monitor

## 2021-09-28 NOTE — PROGRESS NOTES
Infectious Disease     Patient Name: Cecile Stallings  Date: 2021  YOB: 1980  Medical Record Number: [id number]        Intra-abdominal abscess  Postop infection        History of Present Illness:  Hypertension      Recent hysterectomy  2021    UTERINE LEIOMYOMA, 20 wk sized uterus   Procedure(s):  TOTAL LAPAROSCOPIC HYSTERECTOMY WITH PRESERVATION OF OVARIES, POSSIBLE LAPAROTOMY  Fibroid abdominal morcellation through minilaparotomy      Presented to emergency department with bleeding drainage  Pain purulent in the emergency room  And swelling of the wound itself   pain from abdominal hysterectomy site incision no fevers chills sweats  Complaining of significant pain in abdomen  Patient was taking Keflex postoperative    Ultrasound showed evidence of fluid collection consistent with intra-abdominal abscess    EXAMINATION: US SOFT TISSUE LIMITED AREA       HISTORY:   Recent . Concern for abscess.       TECHNIQUE: Grayscale and power Doppler ultrasound imaging was performed in the area of concern and images were saved to the permanent image archive.       Comparison: CT 2021. Ultrasound from 2020.       RESULT:       Limited imaging performed of the midline pelvis at the area of incision. There is no distinct loculated soft tissue collection. Within the pelvis, deep to the bladder, there is a hypoechoic rounded area measuring 5.6 x 7.2 x 4.0 cm.               Impression       Nonspecific rounded hypoechoic lesion posterior to the bladder measuring up to 7.2 cm. Differential includes hematoma/seroma, versus abscess. Review of Systems   Constitutional: Negative for chills, diaphoresis, fatigue and fever. Respiratory: Negative. Cardiovascular: Negative. Gastrointestinal: Positive for abdominal pain. Negative for rectal pain and vomiting. Skin: Positive for wound. Physical Exam  Cardiovascular:      Heart sounds: Normal heart sounds.  No murmur heard.     Pulmonary:      Effort: Pulmonary effort is normal. No respiratory distress. Breath sounds: Normal breath sounds. No wheezing, rhonchi or rales. Abdominal:      General: Abdomen is flat. Bowel sounds are normal. There is no distension. Tenderness: There is abdominal tenderness. There is no guarding. Blood pressure 134/70, pulse 80, temperature 99.1 °F (37.3 °C), temperature source Oral, resp. rate 16, height 5' 4\" (1.626 m), weight 275 lb (124.7 kg), SpO2 98 %.       .   Lab Results   Component Value Date    WBC 7.0 09/28/2021    HGB 11.2 (L) 09/28/2021    HCT 33.8 (L) 09/28/2021    MCV 85.8 09/28/2021     09/28/2021     Lab Results   Component Value Date     09/28/2021    K 4.2 09/28/2021     09/28/2021    CO2 22 09/28/2021    BUN 9 09/28/2021    CREATININE 0.74 09/28/2021    GLUCOSE 108 09/28/2021    CALCIUM 9.2 09/28/2021      Culture, Anaerobic and Aerobic [[id number]] (Abnormal) Collected: 09/26/21 1311   Order Status: Completed Specimen: Abdomen Updated: 09/27/21 1131    Gram Stain Result Few WBC's   Few Gram negative rods     Anaerobic Culture Culture in progress    Organism Gram negative rodAbnormal     WOUND/ABSCESS --    Light growth   ID and sensitivity to follow     Organism Gram negative rodAbnormal              PLAN:    Intra-abdominal abscess  Postop infection    Interventional radiology has been consulted  To have drainage of abdominal fluid    Cultures from wound drainage show gram-negative uma    vancomycin and Zosyn

## 2021-09-28 NOTE — PROGRESS NOTES
Pharmacy Vancomycin Consult     Vancomycin Day: 3  Current Dosing: Vancomycin 1,500 mg IV every 12 hours     Temp max: 99.1    Recent Labs     09/27/21  1450 09/28/21  0909   BUN 10 9       Recent Labs     09/27/21  1450 09/28/21  0909   CREATININE 0.72 0.74       Recent Labs     09/27/21  1450 09/28/21  0909   WBC 7.1 7.0         Intake/Output Summary (Last 24 hours) at 9/28/2021 1013  Last data filed at 9/28/2021 0615  Gross per 24 hour   Intake 1265 ml   Output --   Net 1265 ml       Culture Date      Source                       Results  Procedure Component Value Units Date/Time   Culture, Blood 2 [[id number]] Collected: 09/26/21 1519   Order Status: Completed Specimen: Blood Updated: 09/27/21 2015    Culture, Blood 2 No Growth to date. Any change in status will be called. Narrative:     ORDER#: [id number]                          ORDERED BY: Darian Ross   SOURCE: Blood                              COLLECTED:  09/26/21 15:19   ANTIBIOTICS AT JAGDEEP.:                      RECEIVED :  09/26/21 15:58   Culture, Blood 1 [[id number]] Collected: 09/26/21 1453   Order Status: Completed Specimen: Blood Updated: 09/27/21 1641    Blood Culture, Routine No Growth to date. Any change in status will be called.    Narrative:     ORDER#: [id number]                          ORDERED BY: KAMILLA Roger   SOURCE: Blood                              COLLECTED:  09/26/21 14:53   ANTIBIOTICS AT JAGDEEP.:                      RECEIVED :  09/26/21 14:53   Culture, Anaerobic and Aerobic [[id number]] (Abnormal) Collected: 09/26/21 1311   Order Status: Completed Specimen: Abdomen Updated: 09/27/21 1131    Gram Stain Result Few WBC's   Few Gram negative rods     Anaerobic Culture Culture in progress    Organism Gram negative rodAbnormal     WOUND/ABSCESS --    Light growth   ID and sensitivity to follow     Organism Gram negative rodAbnormal     WOUND/ABSCESS --    Light growth   ID and sensitivity to follow    Narrative:     ORDER#: [id number]                          ORDERED BY: KAMILLA LICEA   SOURCE: Abdomen                            COLLECTED:  09/26/21 13:11   ANTIBIOTICS AT JAGDEEP.:                      RECEIVED :  09/26/21 13:11       Ht Readings from Last 1 Encounters:   09/27/21 5' 4\" (1.626 m)        Wt Readings from Last 1 Encounters:   09/27/21 275 lb (124.7 kg)         Body mass index is 47.2 kg/m². Estimated Creatinine Clearance: 132 mL/min (based on SCr of 0.74 mg/dL). Random: 14.3 mg/L    Assessment/Plan:  Random level is therapeutic at 14.3 mg/L for a goal range of 10-20 mg/L. Will continue with current dose of vancomycin 1,500 mg IV every 12 hours. Per pharmacokinetic monitoring via the [facility] program, this predicts an AUC of 493 mg/L.hr (goal 400-600 mg/L.hr) and a trough of 11.8 mg/L (goal 10-20 mg/L). Recommend next random level in 2-3 days, depending on renal function stability.      Cesar Llamas PharmD   9/28/2021 10:13 AM

## 2021-09-28 NOTE — PROGRESS NOTES
Patient assessed and charted on by this RN. VSS. A&Ox4. Up independently in the room. NPO except sips with meds. Patient BS active. Pain and tenderness in lower abdomin. Will continue to monitor. 1515 - patient picked up to go to CT.

## 2021-09-28 NOTE — CARE COORDINATION
INTERDISCIPLINARY ROUNDING    September 27, 2021 at 2:25 PM EDT    Anticipated Discharge Date: TBD     - Per GYN, consult Infectious disease and IR.   - Per RN notes this date: \"Dr. Kimberly Mahmood called, new orders received and entered. Hospitalist and Interventional Radiology, and pharmacy for Vanco to be restarted. Per Dr. Kimberly Mahmood, the hospitalist can decide if Infectious Disease needs consulted. \"    Anticipated Discharge Disposition: Home, declines needs. Patient Mobility or PT/OT ordered: Not needed, up independently in room. Readmission Risk              Risk of Unplanned Readmission:  8         Discussed patient goal for the day, patient clinical progression, and barriers to discharge. The following Goal(s) of the Day/Commitment(s) have been identified:  Outcomes Documented in Discipline-specific Documentation. Await ID and IR input/plan.     Zuleyka Correa RN  September 27, 2021
[facility]   Initial Discharge Assessment    Met with patient at bedside to discuss discharge plan. PCP: Malvin Lincoln ([facility])                                 Date of Last Visit: unknown  If no PCP, list provided? N/A    Discharge Planning    Living Arrangements: Patient lives independently at home in an apartment, [address]STE. Who do you live with? Patient lives alone. Who helps you with your care: Patient reports that she is independent with ADLs and IADLs. If lives at home: Do you have any barriers navigating in your home? No    Patient can perform ADL? Yes    Current Services (outpatient and in home): None    Dialysis: No    Is transportation available to get to your appointments? Yes - Patient drives. DME Equipment: None. Patient requests abd binder - this was provided by bedside nurse. Respiratory equipment: None    Respiratory provider: LEXI     Pharmacy: [facility] on [facility]. Consult with Medication Assistance Program? No - Patient reports that her insurance covers any medications needed. [place] agreeable to [place] [address]? N/A    Patient agreeable to SNF/Rehab? N/A    Other discharge needs identified? N/A - Patient denies needs. Does Patient Have a High-Risk for Readmission Diagnosis (CHF, PN, MI, COPD)? No     History: Fibroids, hyster 9/16/21, htn. Initial Discharge Plan? (Note: please see concurrent daily documentation for any updates after initial note). Home alone, denies needs at [place].     Readmission Risk              Risk of Unplanned Readmission:  8         Electronically signed by Elaina Smith RN on 9/27/2021 at 10:05 AM
hereby notified than any dissemination, distribution or copying of this communication is strictly prohibited. Please contact the sender for further instructions on handling the information.

## 2021-09-29 VITALS
HEIGHT: 64 IN | DIASTOLIC BLOOD PRESSURE: 84 MMHG | HEART RATE: 70 BPM | OXYGEN SATURATION: 97 % | BODY MASS INDEX: 46.95 KG/M2 | TEMPERATURE: 97.9 F | SYSTOLIC BLOOD PRESSURE: 135 MMHG | WEIGHT: 275 LBS | RESPIRATION RATE: 18 BRPM

## 2021-09-29 PROCEDURE — 6370000000 HC RX 637 (ALT 250 FOR IP): Performed by: OBSTETRICS & GYNECOLOGY

## 2021-09-29 PROCEDURE — 6360000002 HC RX W HCPCS: Performed by: NURSE PRACTITIONER

## 2021-09-29 PROCEDURE — 6370000000 HC RX 637 (ALT 250 FOR IP): Performed by: INTERNAL MEDICINE

## 2021-09-29 PROCEDURE — 2580000003 HC RX 258: Performed by: OBSTETRICS & GYNECOLOGY

## 2021-09-29 PROCEDURE — 99232 SBSQ HOSP IP/OBS MODERATE 35: CPT | Performed by: INTERNAL MEDICINE

## 2021-09-29 PROCEDURE — 2580000003 HC RX 258: Performed by: NURSE PRACTITIONER

## 2021-09-29 PROCEDURE — 6360000002 HC RX W HCPCS: Performed by: OBSTETRICS & GYNECOLOGY

## 2021-09-29 RX ORDER — AMOXICILLIN AND CLAVULANATE POTASSIUM 875; 125 MG/1; MG/1
1 TABLET, FILM COATED ORAL 2 TIMES DAILY
Qty: 20 TABLET | Refills: 0 | Status: SHIPPED | OUTPATIENT
Start: 2021-09-29 | End: 2021-09-29 | Stop reason: HOSPADM

## 2021-09-29 RX ORDER — CIPROFLOXACIN 500 MG/1
500 TABLET, FILM COATED ORAL 2 TIMES DAILY
Qty: 20 TABLET | Refills: 0 | Status: SHIPPED | OUTPATIENT
Start: 2021-09-29 | End: 2021-09-29 | Stop reason: HOSPADM

## 2021-09-29 RX ORDER — IBUPROFEN 800 MG/1
800 TABLET ORAL EVERY 6 HOURS PRN
Qty: 60 TABLET | Refills: 1 | Status: SHIPPED | OUTPATIENT
Start: 2021-09-29 | End: 2022-07-07

## 2021-09-29 RX ORDER — AMOXICILLIN AND CLAVULANATE POTASSIUM 875; 125 MG/1; MG/1
1 TABLET, FILM COATED ORAL 2 TIMES DAILY
Qty: 28 TABLET | Refills: 0 | Status: SHIPPED | OUTPATIENT
Start: 2021-09-29 | End: 2021-10-13

## 2021-09-29 RX ORDER — CIPROFLOXACIN 500 MG/1
500 TABLET, FILM COATED ORAL 3 TIMES DAILY
Qty: 42 TABLET | Refills: 0 | Status: SHIPPED | OUTPATIENT
Start: 2021-09-29 | End: 2021-10-13

## 2021-09-29 RX ADMIN — ATENOLOL 25 MG: 25 TABLET ORAL at 08:32

## 2021-09-29 RX ADMIN — Medication 10 ML: at 08:32

## 2021-09-29 RX ADMIN — PANTOPRAZOLE SODIUM 40 MG: 40 TABLET, DELAYED RELEASE ORAL at 06:08

## 2021-09-29 RX ADMIN — Medication 4 TABLET: at 08:32

## 2021-09-29 RX ADMIN — VANCOMYCIN HYDROCHLORIDE 1500 MG: 5 INJECTION, POWDER, LYOPHILIZED, FOR SOLUTION INTRAVENOUS at 01:35

## 2021-09-29 RX ADMIN — PIPERACILLIN AND TAZOBACTAM 3375 MG: 3; .375 INJECTION, POWDER, LYOPHILIZED, FOR SOLUTION INTRAVENOUS at 03:55

## 2021-09-29 RX ADMIN — POTASSIUM CHLORIDE 10 MEQ: 750 TABLET, FILM COATED, EXTENDED RELEASE ORAL at 08:32

## 2021-09-29 ASSESSMENT — ENCOUNTER SYMPTOMS
ABDOMINAL PAIN: 1
EYES NEGATIVE: 1
GASTROINTESTINAL NEGATIVE: 1
WHEEZING: 0
PHOTOPHOBIA: 0
RESPIRATORY NEGATIVE: 1
ABDOMINAL PAIN: 0
COUGH: 0
SHORTNESS OF BREATH: 0
VOMITING: 0
DIARRHEA: 0
CONSTIPATION: 0
RECTAL PAIN: 0
NAUSEA: 0
BACK PAIN: 0

## 2021-09-29 NOTE — CONSULTS
CC:   Chief Complaint   Patient presents with    Post-op Problem     Bleeding from abdominal hysterectomy site        HPI: Patient is a pleasant 50-year-old woman status post hysterectomy recently. Patient few days ago began to experience pelvic pain. She was admitted, and ultrasound was performed which demonstrated a fluid collection in the pelvis at the site of hysterectomy just posterior to the urinary bladder. Patient has no elevated white count, denies fevers or chills. Interventional radiology was consulted for assessment for possible aspiration of the fluid collection or drainage if able. Family History   Problem Relation Age of Onset    Diabetes Paternal Grandmother     Diabetes Father     Diabetes Paternal Aunt     Diabetes Paternal Uncle     Breast Cancer Neg Hx     Cancer Neg Hx     Colon Cancer Neg Hx     Eclampsia Neg Hx     Ovarian Cancer Neg Hx     Hypertension Neg Hx      Labor Neg Hx     Spont Abortions Neg Hx     Stroke Neg Hx        Past Surgical History:   Procedure Laterality Date    FOOT SURGERY      cyst on right foot as a child    HYSTERECTOMY, VAGINAL N/A 2021    LAPAROSCOPIC HYSTERECTOMY, BILATERAL SALPINGECTOMY, LAPAROTOMY ASSISTED MYOMECTOMY performed by Casimiro Eric MD at [address] [place] [address]  2020    Dr Vicente Irby [phone]        Past Medical History:   Diagnosis Date    Abnormal Pap smear of cervix     Breast disorder     Fibroid     Hypertension        Social History     Socioeconomic History    Marital status: Single     Spouse name: None    Number of children: None    Years of education: None    Highest education level: None   Occupational History    None   Tobacco Use    Smoking status: Former Smoker     Quit date: 2012     Years since quittin.1    Smokeless tobacco: Never Used   Vaping Use    Vaping Use: Never used   Substance and Sexual Activity    Alcohol use: Yes     Comment:  On \"occasion liquor\"    Drug use: No    Sexual activity: Yes     Partners: Male     Comment: Condoms   Other Topics Concern    None   Social History Narrative    None     Social Determinants of Health     Financial Resource Strain:     Difficulty of Paying Living Expenses:    Food Insecurity:     Worried About Running Out of Food in the Last Year:     [address] in the Last Year:    Transportation Needs:     Lack of Transportation (Medical):  Lack of Transportation (Non-Medical):    Physical Activity:     Days of Exercise per Week:     Minutes of Exercise per Session:    Stress:     Feeling of Stress :    Social Connections:     Frequency of Communication with Friends and Family:     Frequency of Social Gatherings with Friends and Family:     Attends Jewish Services:     Active Member of Clubs or Organizations:     Attends Club or Organization Meetings:     Marital Status:    Intimate Partner Violence:     Fear of Current or Ex-Partner:     Emotionally Abused:     Physically Abused:     Sexually Abused: Allergies   Allergen Reactions    Metronidazole Hives    Percocet [Oxycodone-Acetaminophen] Hives       No current facility-administered medications on file prior to encounter. Current Outpatient Medications on File Prior to Encounter   Medication Sig Dispense Refill    traMADol (ULTRAM) 50 MG tablet Take 1 tablet by mouth every 6 hours as needed for Pain for up to 5 days. 20 tablet 0    ibuprofen (ADVIL;MOTRIN) 800 MG tablet Take 1 tablet by mouth every 6 hours as needed for Pain 120 tablet 3    acetaminophen (APAP EXTRA STRENGTH) 500 MG tablet Take 2 tablets by mouth every 6 hours as needed for Pain 60 tablet 1    atenolol (TENORMIN) 25 MG tablet TAKE 1 TABLET BY MOUTH DAILY      gabapentin (NEURONTIN) 600 MG tablet Take 600 mg by mouth 2 times daily as needed. Review of Systems   Constitutional: Negative. Negative for chills, diaphoresis and fever.    HENT: Negative. Negative for congestion, ear pain, hearing loss and tinnitus. Eyes: Negative. Negative for photophobia. Respiratory: Negative. Negative for cough, shortness of breath and wheezing. Cardiovascular: Negative. Negative for chest pain, palpitations and leg swelling. Gastrointestinal: Negative. Negative for abdominal pain, constipation, diarrhea, nausea and vomiting. Genitourinary: Positive for pelvic pain. Negative for dysuria, flank pain, frequency, hematuria and urgency. Musculoskeletal: Negative. Negative for back pain and neck pain. Skin: Negative. Negative for rash. Allergic/Immunologic: Negative for environmental allergies. Neurological: Negative. Negative for dizziness, tremors, weakness and headaches. Hematological: Does not bruise/bleed easily. Psychiatric/Behavioral: Negative. Negative for hallucinations and suicidal ideas. The patient is not nervous/anxious. OBJECTIVE:  /84   Pulse 70   Temp 97.9 °F (36.6 °C)   Resp 18   Ht 5' 4\" (1.626 m)   Wt 275 lb (124.7 kg)   LMP  (LMP Unknown)   SpO2 97%   BMI 47.20 kg/m²     Physical Exam  Constitutional:       General: She is not in acute distress. Appearance: She is well-developed. She is not diaphoretic. HENT:      Head: Normocephalic and atraumatic. Nose: Nose normal.      Mouth/Throat:      Pharynx: No oropharyngeal exudate. Eyes:      General: No scleral icterus. Right eye: No discharge. Left eye: No discharge. Conjunctiva/sclera: Conjunctivae normal.   Neck:      Thyroid: No thyromegaly. Vascular: No JVD. Trachea: No tracheal deviation. Cardiovascular:      Rate and Rhythm: Normal rate and regular rhythm. Heart sounds: Normal heart sounds. No murmur heard. No friction rub. No gallop. Pulmonary:      Effort: No respiratory distress. Breath sounds: No stridor. No wheezing or rales. Chest:      Chest wall: No tenderness.    Abdominal: General: Bowel sounds are normal. There is no distension. Palpations: Abdomen is soft. There is no mass. Tenderness: There is no abdominal tenderness. There is no guarding or rebound. Hernia: No hernia is present. Comments: Older surgical incision noted. Musculoskeletal:         General: No tenderness or deformity. Cervical back: Neck supple. Skin:     General: Skin is dry. Coloration: Skin is not pale. Findings: No erythema or rash. Neurological:      Mental Status: She is alert and oriented to person, place, and time. Cranial Nerves: No cranial nerve deficit. Psychiatric:         Behavior: Behavior normal.         Thought Content: Thought content normal.         Judgment: Judgment normal.             US DUP LOWER EXTREMITIES BILATERAL VENOUS    Result Date: 9/27/2021  BILATERAL LOWER EXTREMITY DEEP VENOUS ULTRASOUND WITH DOPPLER IMAGING CLINICAL HISTORY:  Lower extremity swelling COMPARISON: None TECHNIQUE:  Guicho Loud scale with compression maneuvers, Color Doppler and Spectral Doppler at rest and with augmentation of the distal external iliac, common femoral, femoral and popliteal veins was performed. Grey scale with compression maneuvers of the peroneal and posterior tibial veins was performed. The great and small saphenous veins were imaged in grey scale with compression maneuvers at their insertion to the deep system. Imaging was performed in both lower extremities. Images were obtained and stored in a permanent archive.  RESULT: RIGHT LOWER EXTREMITY PROXIMAL DEEP VEINS Distal External Iliac and Common Femoral Veins:      Compression: Normal      Doppler: Normal, spontaneous respirophasic flow                     Normal response to augmentation  Femoral vein:      Compression: Normal      Doppler: Normal, spontaneous flow                     Normal response to augmentation Popliteal vein:      Compression: Normal      Doppler: Normal, spontaneous flow Normal response to augmentation CALF DEEP VEINS Peroneal veins: Normal compression Posterior tibial veins: Normal compression Gastrocnemius and Soleal veins: Not imaged SUPERFICIAL VEINS Great saphenous: Patent and compressible at insertion into common femoral vein; not otherwise assessed. Small Saphenous:Patent and compressible in the proximal calf, not otherwise assessed. LEFT LOWER EXTREMITY PROXIMAL DEEP VEINS Distal External Iliac and Common Femoral Veins:      Compression: Normal      Doppler: Normal, spontaneous respirophasic flow                      Normal response to augmentation Femoral vein:      Compression: Normal      Doppler: Normal, spontaneous flow                      Normal response to augmentation  Popliteal vein:      Compression: Normal      Doppler: Normal, spontaneous flow                      Normal response to augmentation CALF DEEP VEINS Peroneal veins: Normal compression Posterior tibial veins: Normal compression Gastrocnemius and Soleal veins: Not imaged SUPERFICIAL VEINS Great saphenous: Patent at insertion into common femoral vein; not otherwise assessed. Small Saphenous: Patent and compressible in the proximal calf, not otherwise assessed. NEGATIVE STUDY FOR ACUTE PROXIMAL DVT IN THE RIGHT AND LEFT LOWER EXTREMITIES. NEGATIVE STUDY FOR ACUTE CALF DVT IN THE RIGHT AND LEFT LOWER EXTREMITIES. NEGATIVE STUDY FOR SUPERFICIAL THROMBOPHLEBITIS IN THE RIGHT AND LEFT LOWER EXTREMITIES. ASSESSMENT ANDPLAN:    Assessment: Patient with post hysterectomy fluid infection at the prior uterine bed. White blood cell count is normal without any fevers or chills. Preprocedure CT scan was performed which demonstrated very far distance of the collection from the skin which is farther than drainage needles, additionally urinary bladder and bowel loops are in the way which precludes access safely. Plan: Patient will follow with OB/GYN and infectious disease.   If patient develops clinical signs that this collection might be infected, then surgical drainage might be necessary at that point. At the moment no interventional radiology procedure is able to be performed.     Noe Vega MD, Flaco Maldonado

## 2021-09-29 NOTE — FLOWSHEET NOTE
Assessment completed. VS WNL. Abdominal sounds active; denies pain. Drsg. change with dry dressing. Up independent in room. Denies further need at the moment. Standard safety precaution. Will continue cathleen monitor.

## 2021-09-29 NOTE — DISCHARGE SUMMARY
Discharge Summary         Jay Acevedo is a 37 y.o. female who presents here today for complaints of Post-op Problem (Bleeding from abdominal hysterectomy site)    HD # 3 . C/p IR drainage , cultures + klebsiella, psudomonas , entrocococcus. S/p ID consult. Patient feels better . Has received vancomycin and zosyn IV in house . Savita Fanning No fever or chills. Asking to go home.        Vitals:  /84   Pulse 70   Temp 97.9 °F (36.6 °C)   Resp 18   Ht 5' 4\" (1.626 m)   Wt 275 lb (124.7 kg)   LMP  (LMP Unknown)   SpO2 97%   BMI 47.20 kg/m²   Allergies:  Metronidazole and Percocet [oxycodone-acetaminophen]  Past Medical History:   Diagnosis Date    Abnormal Pap smear of cervix     Breast disorder     Fibroid     Hypertension      Past Surgical History:   Procedure Laterality Date    FOOT SURGERY      cyst on right foot as a child    HYSTERECTOMY, VAGINAL N/A 2021    LAPAROSCOPIC HYSTERECTOMY, BILATERAL SALPINGECTOMY, LAPAROTOMY ASSISTED MYOMECTOMY performed by Loida Elaine MD at [address] [place] [address]  2020    Dr Phil So [phone]     OB History        0    Para   0    Term   0       0    AB   0    Living   0       SAB   0    TAB   0    Ectopic   0    Molar   0    Multiple   0    Live Births   0              Family History   Problem Relation Age of Onset    Diabetes Paternal Grandmother     Diabetes Father     Diabetes Paternal Aunt     Diabetes Paternal Uncle     Breast Cancer Neg Hx     Cancer Neg Hx     Colon Cancer Neg Hx     Eclampsia Neg Hx     Ovarian Cancer Neg Hx     Hypertension Neg Hx      Labor Neg Hx     Spont Abortions Neg Hx     Stroke Neg Hx      Social History     Socioeconomic History    Marital status: Single     Spouse name: Not on file    Number of children: Not on file    Years of education: Not on file    Highest education level: Not on file   Occupational History    Not on file   Tobacco Use    Smoking status: Former Smoker     Quit date: 2012     Years since quittin.1    Smokeless tobacco: Never Used   Vaping Use    Vaping Use: Never used   Substance and Sexual Activity    Alcohol use: Yes     Comment: On \"occasion liquor\"    Drug use: No    Sexual activity: Yes     Partners: Male     Comment: Condoms   Other Topics Concern    Not on file   Social History Narrative    Not on file     Social Determinants of Health     Financial Resource Strain:     Difficulty of Paying Living Expenses:    Food Insecurity:     Worried About Running Out of Food in the Last Year:     [address] in the Last Year:    Transportation Needs:     Lack of Transportation (Medical):  Lack of Transportation (Non-Medical):    Physical Activity:     Days of Exercise per Week:     Minutes of Exercise per Session:    Stress:     Feeling of Stress :    Social Connections:     Frequency of Communication with Friends and Family:     Frequency of Social Gatherings with Friends and Family:     Attends Zoroastrian Services:     Active Member of Clubs or Organizations:     Attends Club or Organization Meetings:     Marital Status:    Intimate Partner Violence:     Fear of Current or Ex-Partner:     Emotionally Abused:     Physically Abused:     Sexually Abused:        Contraceptive method:  none    Patient's medications, allergies, past medical, surgical, social and family histories were reviewed and updated as appropriate. Review of Systems  As per chief complaint   All other systems reviewed and are negative.     Physical Exam:  Vitals:  /84   Pulse 70   Temp 97.9 °F (36.6 °C)   Resp 18   Ht 5' 4\" (1.626 m)   Wt 275 lb (124.7 kg)   LMP  (LMP Unknown)   SpO2 97%   BMI 47.20 kg/m²   Lungs: CTAB   Heart : Regular S1/S2, no M/R/G  Abdomen: Soft , NT, ND , + BS   Pelvic exam : deferred    Assessment:     Postop hematoma   Wound infection     Plan:     Based on C&S results, IR results and stable clinical picture. Will be discharged on augmentin and Ciprofloxacin based on C&S and ID recommendation. F/u in 1 week        Medication List      START taking these medications    amoxicillin-clavulanate 875-125 MG per tablet  Commonly known as: AUGMENTIN  Take 1 tablet by mouth 2 times daily for 14 days     ciprofloxacin 500 MG tablet  Commonly known as: CIPRO  Take 1 tablet by mouth 3 times daily for 14 days        CHANGE how you take these medications    * ibuprofen 800 MG tablet  Commonly known as: ADVIL;MOTRIN  Take 1 tablet by mouth every 6 hours as needed for Pain  What changed: Another medication with the same name was added. Make sure you understand how and when to take each. * ibuprofen 800 MG tablet  Commonly known as: ADVIL;MOTRIN  Take 1 tablet by mouth every 6 hours as needed for Pain  What changed: You were already taking a medication with the same name, and this prescription was added. Make sure you understand how and when to take each. * This list has 2 medication(s) that are the same as other medications prescribed for you. Read the directions carefully, and ask your doctor or other care provider to review them with you. CONTINUE taking these medications    acetaminophen 500 MG tablet  Commonly known as: APAP Extra Strength  Take 2 tablets by mouth every 6 hours as needed for Pain     atenolol 25 MG tablet  Commonly known as: TENORMIN     gabapentin 600 MG tablet  Commonly known as: NEURONTIN     traMADol 50 MG tablet  Commonly known as: ULTRAM  Take 1 tablet by mouth every 6 hours as needed for Pain for up to 5 days.         STOP taking these medications    cephALEXin 500 MG capsule  Commonly known as: KEFLEX     potassium chloride 10 MEQ extended release tablet  Commonly known as: KLOR-CON     PriLOSEC 20 MG delayed release capsule  Generic drug: omeprazole     temazepam 30 MG capsule  Commonly known as: RESTORIL     Vitamin C 500 MG Caps           Where to Get Your Medications      These medications were sent to [facility] [address], [address] - P [phone] Chai Camp [phone] [address] [facility] [address]    Phone: [phone]   · amoxicillin-clavulanate 875-125 MG per tablet  · ciprofloxacin 500 MG tablet     You can get these medications from any pharmacy    Bring a paper prescription for each of these medications  · ibuprofen 800 MG tablet         Radha Purcell M.D., F.A.C.O. G

## 2021-09-29 NOTE — PROGRESS NOTES
Infectious Disease     Patient Name: Etelvina Walsh  Date: 2021  YOB: 1980  Medical Record Number: [id number]        Intra-abdominal abscess  Postop infection        History of Present Illness:  Hypertension      Recent hysterectomy  2021    UTERINE LEIOMYOMA, 20 wk sized uterus   Procedure(s):  TOTAL LAPAROSCOPIC HYSTERECTOMY WITH PRESERVATION OF OVARIES, POSSIBLE LAPAROTOMY  Fibroid abdominal morcellation through minilaparotomy      Presented to emergency department with bleeding drainage  Pain purulent in the emergency room  And swelling of the wound itself   pain from abdominal hysterectomy site incision no fevers chills sweats  Complaining of significant pain in abdomen  Patient was taking Keflex postoperative    Ultrasound showed evidence of fluid collection consistent with intra-abdominal abscess    EXAMINATION: US SOFT TISSUE LIMITED AREA       HISTORY:   Recent . Concern for abscess.       TECHNIQUE: Grayscale and power Doppler ultrasound imaging was performed in the area of concern and images were saved to the permanent image archive.       Comparison: CT 2021. Ultrasound from 2020.       RESULT:       Limited imaging performed of the midline pelvis at the area of incision. There is no distinct loculated soft tissue collection. Within the pelvis, deep to the bladder, there is a hypoechoic rounded area measuring 5.6 x 7.2 x 4.0 cm.               Impression       Nonspecific rounded hypoechoic lesion posterior to the bladder measuring up to 7.2 cm. Differential includes hematoma/seroma, versus abscess. Review of Systems   Constitutional: Negative for chills, diaphoresis, fatigue and fever. Respiratory: Negative. Cardiovascular: Negative. Gastrointestinal: Positive for abdominal pain. Negative for rectal pain and vomiting. Skin: Positive for wound. Physical Exam  Cardiovascular:      Heart sounds: Normal heart sounds.  No murmur heard.     Pulmonary:      Effort: Pulmonary effort is normal. No respiratory distress. Breath sounds: Normal breath sounds. No wheezing, rhonchi or rales. Abdominal:      General: Abdomen is flat. Bowel sounds are normal. There is no distension. Tenderness: There is abdominal tenderness. There is no guarding. Blood pressure 135/84, pulse 70, temperature 97.9 °F (36.6 °C), resp. rate 18, height 5' 4\" (1.626 m), weight 275 lb (124.7 kg), SpO2 97 %. .   Lab Results   Component Value Date    WBC 7.0 09/28/2021    HGB 11.2 (L) 09/28/2021    HCT 33.8 (L) 09/28/2021    MCV 85.8 09/28/2021     09/28/2021     Lab Results   Component Value Date     09/28/2021    K 4.2 09/28/2021     09/28/2021    CO2 22 09/28/2021    BUN 9 09/28/2021    CREATININE 0.74 09/28/2021    GLUCOSE 108 09/28/2021    CALCIUM 9.2 09/28/2021        Gram Stain Result 09/26/2021  1:11 PM [address] [facility]   Few WBC's   Few Gram negative rods    Anaerobic Culture 09/26/2021  1:11 PM [address] [facility]   No Anaerobes Isolated    Organism Abnormal  09/26/2021  1:11 PM  - [facility]   Klebsiella pneumoniae ssp pneumoniae    WOUND/ABSCESS 09/26/2021  1:11 PM [address] [facility]   Light growth    Organism Abnormal  09/26/2021  1:11 PM [address] [facility]   Pseudomonas aeruginosa    WOUND/ABSCESS 09/26/2021  1:11 PM [address] [facility]   Light growth    Organism Abnormal  09/26/2021  1:11 PM [address] [facility]   Enterococcus Group D    WOUND/ABSCESS 09/26/2021  1:11 PM [address] [facility]   Light growth   ID and sensitivity to follow    Testing Performed By    Lab - Abbreviation Name Director Address Valid Date Range   [facility] [address] [facility] Jenaro Hollins M.D. [address] [place] [address] 09/14/21 1214-Present   Narrative  Performed by: [facility]  ORDER#: [id number]                          ORDERED BY: KAMILLA LICEA   SOURCE: Abdomen                            COLLECTED:  09/26/21 13:11   ANTIBIOTICS AT [facility]. :                      RECEIVED :  09/26/21 13:11   Susceptibility    Klebsiella pneumoniae ssp pneumoniae (1)    Antibiotic Interpretation DONY Status    amoxicillin-clavulanate Sensitive 4 mcg/mL     ceFAZolin Resistant >=64 mcg/mL     cefTRIAXone Sensitive <=1 mcg/mL     ciprofloxacin Sensitive <=0.25 mcg/mL     gentamicin Sensitive <=1 mcg/mL     trimethoprim-sulfamethoxazole Sensitive <=20 mcg/mL     Pseudomonas aeruginosa (2)    Antibiotic Interpretation DONY Status    cefepime Sensitive 2 mcg/mL     ciprofloxacin Sensitive <=0.25 mcg/mL     gentamicin Sensitive <=1 mcg/mL     imipenem Sensitive 1 mcg/mL     piperacillin-tazobactam Sensitive 8 mcg/mL     tobramycin Sensitive <=1 mcg/mL             PLAN:      Postop infection    Interventional radiology CT scan appears consistent with hematoma drainage not attempted because of the location    Cultures from wound drainage growing Klebsiella Pseudomonas and Enterococcus    Hematoma probably not infected superficial wound infection  Okay to discharge on Cipro and Augmentin

## 2021-09-29 NOTE — OP NOTE
Operative Report      Pre-operative Diagnosis: AUB, large fibroid > 12 cm     Post-operative Diagnosis: same     Operation: TLH with bilateral salpingectomies and removal of large fibroid > 2lbs in weight     Surgeon: Philippe Hicks M.D     Anesthesia: GETA    Findings: large fibroid     Estimated Blood Loss:  less than 50 ml           Specimens: Uterus . Bilateral tubes. lareg fibroid weighing > 2lbs            Complications:  None; patient tolerated the procedure well. Additional 5 cm incision was made in the suprapubic area to remove fibroid . Disposition: PACU - hemodynamically stable. Procedure Details      The patient was seen in the Holding Room. The risks, benefits, complications, treatment options, and expected outcomes were discussed with the patient. The patient concurred with the proposed plan, giving informed consent. The patient was taken to Operating Room,identified as name,  the procedure verified as TLH with massive fibroid removal. A Time Out was held and the above information confirmed. After induction of anesthesia, the patient was draped and prepped in the usual sterile manner. A Bates catheter was inserted into her bladder. Two retractors were placed into the vagina. A single tooth tenaculum was used to grasp the anterior lip of the cervix. The [address] [employer] uterine manipulator  was placed and the retractors were removed. Attention was then turned to the abdomen. . A 5 mm skin incision supraumbilical incision was made. A Veress needle was   then inserted. The initial pressure was 5mmhg . Pneumoperitoneum was created till 15mmhg of CO2 gas pressure was recorded on insuflator. The laparoscope was then inserted in the 5 mm trocar, and safe entry was confirmed under direct visualizaion through the trocar. Three other trocars were then inserted under direct visualization.  After injecting the local anaesthetic mix mentioned above at each port entry site,  On the left side slightly below the level of the umbilicus and 8 cm lateral to the midline, a 12mm incision was made through which a 12 mm trocar was inserted, then on the same side approximately 15 cm lateral to the midline and at the level of the anterior superior iliac spine, a 5 mm incision was made through which a 5 mm trocar was inserted under direct visualization as well. On the right side, 12 cm lateral to the midline and 5 cm below the level of the umbilicus another 5 mm incision was made through which a 5 mm trocar was inserted. Patient was then placed in T-glez and a survey of the abdomen and pelvis was performed which revealed a massive fibroid extending all the way above the umbilicus. Ureters were identified bilaterally. Due to hindering visualization incision was to remove the fibroid first and then proceed with a hysterectomy, the fibroid attachment was in the fundal area more towards the right side, I used the bipolar cautery together with harmonic Ace device to slowly excise the fibroid from the uterine corpus bleeding was minimal.  After detaching the fibroid from the uterus the fibroid was pushed higher up in the abdomen and the hysterectomy portion of the procedure commenced. The left utero-ovarian ligament was then grasped , coagulated and cut using the Harmonic ace device from [employer]. The bipolar cautery was also used on the field for any non hemostatic areas or bleeding pedicles and was used sequentially with the harmonic ace device. After the left utero-ovarian ligament , then the left round ligament was coagulated and cut, followed by the anterior and posterior leaf of the broad ligament which was coagulated and cut multiple times all the way down to the uterine vessels on the left side. The anterior leaf of the broad ligament was dissected all the way anteriorly over the lower uterine ligament on the left side, when the bladder flap was also dissected from that side as well.  The posterior leaf was also dissected posteriorly arround the edge of the V-care cup. By completing the dissection of the broad ligament, the uterine vessels were skeletonized, i used the bipolar coagulator to grasp the vessels and were coagulated multiple times, then the harmonic ace was used and the vessels were transected completely, hemostasis was intact. Same steps were carried out on the right side all the way down to the uterine vessels which were coagulated and cut in same fashion. The bladder flap dissection was then completed from the right side, and the bladder was pushed down in the pelvis completely freeing the anterior lower uterine segment. The v-care cup edge was then seen clearly arround the vaginal cuff. Uterus was noted to have blanched at that time. I then used the monopolar hook to cut through the vaginal cuff surrounding the cervix and uterus, arround the edge of the v-care cup used as guidance , pushing up on the v-care cup during the procedure to prevent any ureteral injury. After the uterus and cervix were completely freed from surrounding vaginal tissue, the uterus was exteriorized vaginally. Attention was then towards the right and left fallopian tubes were then removed using the harmonic ace device cutting through mesosalpinx. The right and left ovary were preserved. Attempts to remove the large fibroid vaginally were very difficult due to the size of the fibroid, decision was to create an additional incision 2 cm above the symphysis pubis, approximately 5 cm in length, I used a 11 blade and the skin was incised with subsequent layer of tissue all the way down to the fascia which was extended to the length of the incision, the fibroid was then pulled into the incision and gradually morcellated and removed in pieces, until all the fibroid was removed. The morcellation process took approximately an hour.   After removing the fibroid the use suprapubic incision was closed using 0 Vicryl to close the fascia in a continuous fashion followed by 2-0 Vicryl to close the subcutaneous layer and then 4-0 Monocryl to close the skin in subcuticular fashion and Dermabond was applied. The procedure was then continued laparoscopically, due to the body habitus of the patient closing the vaginal cuff vaginally was also not feasible. So laparoscopically the vaginal cuff was then closed using 0-Vicryl sutures , in an interrupted fashion which were tied utilizing extracorporeal knot tying techniques. 6 Vicryl sutures were used to closed cuff completely. Care was taken to incorporate distal portion of uterosacral ligaments with the cuff closure to prevent future prolapse. Irrigation was then carried out and pedicles rechecked and found to be hemostatic. The 12mm facial incision was approximated using the endo loop closure device with 0-vicryl suture utilized. At that point gas was emptied , All  the instruments were removed from the abdomen. The counts were correct . The skin incisions were closed with a subcuticular fashion. She was sent to the recovery room in good condition. Instrument, sponge, and needle counts were correct prior to  closure and at the conclusion of the case. Iona Trejo M.D., F.ANNMARIE.C.O. G

## 2021-09-29 NOTE — PROGRESS NOTES
Giuseppe Thurston is a 37 y.o. female who presents here today for complaints of Post-op Problem (Bleeding from abdominal hysterectomy site)    HD # 3 . C/p IR drainage , cultures + klebsiella, psudomonas , entrocococcus. S/p ID consult. Patient feels better . Has received vancomycin and zosyn IV in house . Trula Blew No fever or chills. Asking to go home.        Vitals:  /84   Pulse 70   Temp 97.9 °F (36.6 °C)   Resp 18   Ht 5' 4\" (1.626 m)   Wt 275 lb (124.7 kg)   LMP  (LMP Unknown)   SpO2 97%   BMI 47.20 kg/m²   Allergies:  Metronidazole and Percocet [oxycodone-acetaminophen]  Past Medical History:   Diagnosis Date    Abnormal Pap smear of cervix     Breast disorder     Fibroid     Hypertension      Past Surgical History:   Procedure Laterality Date    FOOT SURGERY      cyst on right foot as a child    HYSTERECTOMY, VAGINAL N/A 2021    LAPAROSCOPIC HYSTERECTOMY, BILATERAL SALPINGECTOMY, LAPAROTOMY ASSISTED MYOMECTOMY performed by Katie Bunch MD at [address] [place] [address]  2020    Dr Emilia Merritt [phone]     OB History        0    Para   0    Term   0       0    AB   0    Living   0       SAB   0    TAB   0    Ectopic   0    Molar   0    Multiple   0    Live Births   0              Family History   Problem Relation Age of Onset    Diabetes Paternal Grandmother     Diabetes Father     Diabetes Paternal Aunt     Diabetes Paternal Uncle     Breast Cancer Neg Hx     Cancer Neg Hx     Colon Cancer Neg Hx     Eclampsia Neg Hx     Ovarian Cancer Neg Hx     Hypertension Neg Hx      Labor Neg Hx     Spont Abortions Neg Hx     Stroke Neg Hx      Social History     Socioeconomic History    Marital status: Single     Spouse name: Not on file    Number of children: Not on file    Years of education: Not on file    Highest education level: Not on file   Occupational History    Not on file   Tobacco Use    Smoking status: Former Smoker Quit date: 2012     Years since quittin.1    Smokeless tobacco: Never Used   Vaping Use    Vaping Use: Never used   Substance and Sexual Activity    Alcohol use: Yes     Comment: On \"occasion liquor\"    Drug use: No    Sexual activity: Yes     Partners: Male     Comment: Condoms   Other Topics Concern    Not on file   Social History Narrative    Not on file     Social Determinants of Health     Financial Resource Strain:     Difficulty of Paying Living Expenses:    Food Insecurity:     Worried About Running Out of Food in the Last Year:     [address] in the Last Year:    Transportation Needs:     Lack of Transportation (Medical):  Lack of Transportation (Non-Medical):    Physical Activity:     Days of Exercise per Week:     Minutes of Exercise per Session:    Stress:     Feeling of Stress :    Social Connections:     Frequency of Communication with Friends and Family:     Frequency of Social Gatherings with Friends and Family:     Attends Islam Services:     Active Member of Clubs or Organizations:     Attends Club or Organization Meetings:     Marital Status:    Intimate Partner Violence:     Fear of Current or Ex-Partner:     Emotionally Abused:     Physically Abused:     Sexually Abused:        Contraceptive method:  none    Patient's medications, allergies, past medical, surgical, social and family histories were reviewed and updated as appropriate. Review of Systems  As per chief complaint   All other systems reviewed and are negative. Physical Exam:  Vitals:  /84   Pulse 70   Temp 97.9 °F (36.6 °C)   Resp 18   Ht 5' 4\" (1.626 m)   Wt 275 lb (124.7 kg)   LMP  (LMP Unknown)   SpO2 97%   BMI 47.20 kg/m²   Lungs: CTAB   Heart : Regular S1/S2, no M/R/G  Abdomen: Soft , NT, ND , + BS   Pelvic exam : deferred    Assessment:     Postop hematoma   Wound infection     Plan:     Based on C&S results, IR results and stable clinical picture.  Will be discharged on augmentin and ciprofloxacin based on C&S and ID recommendation. Advised to follow in office in 1 week. Alice Kennedy M.D., F.ANNMARIE.C.RADHA. G

## 2021-09-29 NOTE — PROGRESS NOTES
New dressing applied to lower abd incision that was draining. Other incisions are clean, dry, and intact. I gave pt an abd binder. She has not required any pain meds throughout the day. The script for ibuprofen was not signed by Dr. Germain Maki, I was able to call this in to her pharmarcy. She understands f/u appt and new meds. No questions or concerns. Waiting on her ride home.

## 2021-09-29 NOTE — PROGRESS NOTES
Hospitalist Progress Note      PCP: Bandar Sanchez    Date of Admission: 9/26/2021    Chief Complaint:    Chief Complaint   Patient presents with    Post-op Problem     Bleeding from abdominal hysterectomy site     Subjective:  Patient denies fevers, chills, sweats, CP, SOB. 12 point ROS negative other than mentioned above     Medications:  Reviewed    Infusion Medications    sodium chloride       Scheduled Medications    lactobacillus acidophilus  4 tablet Oral TID    vancomycin  1,500 mg IntraVENous Q12H    vancomycin (VANCOCIN) intermittent dosing (placeholder)   Other RX Placeholder    piperacillin-tazobactam  3,375 mg IntraVENous Q8H    sodium chloride flush  5-40 mL IntraVENous 2 times per day    atenolol  25 mg Oral Daily    pantoprazole  40 mg Oral BID AC    potassium chloride  10 mEq Oral Daily     PRN Meds: sodium chloride flush, sodium chloride, acetaminophen, ondansetron **OR** ondansetron, gabapentin, LORazepam, traMADol      Intake/Output Summary (Last 24 hours) at 9/29/2021 1133  Last data filed at 9/29/2021 0608  Gross per 24 hour   Intake 1365 ml   Output --   Net 1365 ml     Exam:    /84   Pulse 70   Temp 97.9 °F (36.6 °C)   Resp 18   Ht 5' 4\" (1.626 m)   Wt 275 lb (124.7 kg)   LMP  (LMP Unknown)   SpO2 97%   BMI 47.20 kg/m²     General appearance: No apparent distress, appears stated age and cooperative. HEENT: Conjunctivae/corneas clear. Neck: Trachea midline. Respiratory:  Normal respiratory effort. Clear to auscultation  Cardiovascular: Regular rate and rhythm   Abdomen: Soft, non-tender, non-distended with normal bowel sounds. Musculoskeletal: No clubbing, cyanosis or edema bilaterally.   Neuro: Non Focal.  Capillary Refill: Brisk,< 3 seconds   Peripheral Pulses: +2 palpable, equal bilaterally     Labs:   Recent Labs     09/26/21  1300 09/27/21  1450 09/28/21  0909   WBC 7.4 7.1 7.0   HGB 11.7* 11.3* 11.2*   HCT 35.3* 33.2* 33.8*    320 340     Recent Labs 09/26/21  1300 09/27/21  1450 09/28/21  0909    142 138   K 3.8 4.1 4.2    107 103   CO2 26 21 22   BUN 10 10 9   CREATININE 0.60 0.72 0.74   CALCIUM 8.8 9.3 9.2     Recent Labs     09/26/21  1300   AST 20   ALT 16   BILITOT 0.3   ALKPHOS 68     No results for input(s): INR in the last 72 hours. No results for input(s): Cindy Hope in the last 72 hours. Urinalysis:      Lab Results   Component Value Date    NITRU Negative 05/08/2021    WBCUA 20-50 09/03/2018    BACTERIA Moderate 09/03/2018    RBCUA 0-2 09/03/2018    BLOODU Negative 05/08/2021    SPECGRAV 1.018 05/08/2021    GLUCOSEU 100 05/08/2021     Radiology:  US DUP LOWER EXTREMITIES BILATERAL VENOUS   Final Result   NEGATIVE STUDY FOR ACUTE PROXIMAL DVT IN THE RIGHT AND LEFT LOWER EXTREMITIES. NEGATIVE STUDY FOR ACUTE CALF DVT IN THE RIGHT AND LEFT LOWER EXTREMITIES. NEGATIVE STUDY FOR SUPERFICIAL THROMBOPHLEBITIS IN THE RIGHT AND LEFT LOWER EXTREMITIES. US SOFT TISSUE LIMITED AREA   Final Result      Nonspecific rounded hypoechoic lesion posterior to the bladder measuring up to 7.2 cm. Differential includes hematoma/seroma, versus abscess. No soft tissue collection visualized. CT PELVIS WO CONTRAST Additional Contrast? None    (Results Pending)     Assessment/Plan:    1. Postop wound infection/intra abdominal abscess; s/p laparoscopic hysterectomy:   Failed outpatient treatment; not amendable to drainage at this time; Abx per ID and primary  2. Anemia: Mild. Likely post-op related. Stable  3. Hypertension: Stable. Continue atenolol. 4. Localized edema: 2+ nonpitting edema to bilateral lower extremities: DVT ruled out  5. GERD: PPI  6.  DVT prophylaxis: Per primary team    Active Hospital Problems    Diagnosis Date Noted    Postoperative wound infection [T81.49XA] 09/26/2021    Postoperative intra-abdominal abscess [T81.43XA] 09/26/2021      Additional work up or/and treatment plan may be added today or then after based on clinical progression. I am managing a portion of pt care. Some medical issues are handled by other specialists. Additional work up and treatment should be done in out pt setting by pt PCP and other out pt providers. In addition to examining and evaluating pt, I spent additional time explaining care, normal and abnormal findings, and treatment plan. All of pt questions were answered. Counseling, diet and education were  provided. Case will be discussed with nursing staff when appropriate. Family will be updated if and when appropriate. Diet: ADULT DIET;  Regular    Code Status: Full Code    PT/OT Eval     Electronically signed by Helen Rasheed MD on 9/29/2021 at 11:33 AM

## 2021-09-30 LAB
ANAEROBIC CULTURE: ABNORMAL
GRAM STAIN RESULT: ABNORMAL
ORGANISM: ABNORMAL
WOUND/ABSCESS: ABNORMAL

## 2021-10-01 ENCOUNTER — OFFICE VISIT (OUTPATIENT)
Dept: OBGYN CLINIC | Age: 41
End: 2021-10-01

## 2021-10-01 VITALS
WEIGHT: 263 LBS | BODY MASS INDEX: 49.65 KG/M2 | SYSTOLIC BLOOD PRESSURE: 116 MMHG | HEART RATE: 88 BPM | HEIGHT: 61 IN | DIASTOLIC BLOOD PRESSURE: 72 MMHG

## 2021-10-01 DIAGNOSIS — T14.8XXA WOUND INFECTION: Primary | ICD-10-CM

## 2021-10-01 DIAGNOSIS — L08.9 WOUND INFECTION: Primary | ICD-10-CM

## 2021-10-01 LAB
BLOOD CULTURE, ROUTINE: NORMAL
CULTURE, BLOOD 2: NORMAL

## 2021-10-01 PROCEDURE — 99024 POSTOP FOLLOW-UP VISIT: CPT | Performed by: OBSTETRICS & GYNECOLOGY

## 2021-10-01 NOTE — PROGRESS NOTES
Uncle     Breast Cancer Neg Hx     Cancer Neg Hx     Colon Cancer Neg Hx     Eclampsia Neg Hx     Ovarian Cancer Neg Hx     Hypertension Neg Hx      Labor Neg Hx     Spont Abortions Neg Hx     Stroke Neg Hx      Social History     Socioeconomic History    Marital status: Single     Spouse name: Not on file    Number of children: Not on file    Years of education: Not on file    Highest education level: Not on file   Occupational History    Not on file   Tobacco Use    Smoking status: Former Smoker     Quit date: 2012     Years since quittin.1    Smokeless tobacco: Never Used   Vaping Use    Vaping Use: Never used   Substance and Sexual Activity    Alcohol use: Yes     Comment: On \"occasion liquor\"    Drug use: No    Sexual activity: Yes     Partners: Male     Comment: Condoms   Other Topics Concern    Not on file   Social History Narrative    Not on file     Social Determinants of Health     Financial Resource Strain:     Difficulty of Paying Living Expenses:    Food Insecurity:     Worried About Running Out of Food in the Last Year:     [address] in the Last Year:    Transportation Needs:     Lack of Transportation (Medical):      Lack of Transportation (Non-Medical):    Physical Activity:     Days of Exercise per Week:     Minutes of Exercise per Session:    Stress:     Feeling of Stress :    Social Connections:     Frequency of Communication with Friends and Family:     Frequency of Social Gatherings with Friends and Family:     Attends Scientology Services:     Active Member of Clubs or Organizations:     Attends Club or Organization Meetings:     Marital Status:    Intimate Partner Violence:     Fear of Current or Ex-Partner:     Emotionally Abused:     Physically Abused:     Sexually Abused:        Contraceptive method:  none    Patient's medications, allergies, past medical, surgical,social and family histories were reviewed and updated as appropriate. Review of Systems  Review of Systems   All other systems reviewed and are negative. Review of Systems  Constitutional: Negative for chills and fever. Respiratory: Negative for coughand shortness of breath. Cardiovascular: Negative for chestpain and palpitations. Gastrointestinal: Negative for nauseaand vomiting. Genitourinary: Negative for dysuria, frequencyand urgency. Musculoskeletal: Negative for myalgias. Neurological: Negative for dizziness, seizures andheadaches. Psychiatric/Behavioral: Negativefor depression and suicidal ideas. Exam  Physical Exam  Exam   Constitutional: Sheis well-developed, well-nourished, and in no distress. Cardiovascular: Normal rate and regularrhythm. Pulmonary/Chest: Effort normal and breath sounds normal.  Abdominal: Soft. Bowel sounds are normal.   Incision/s intact clean and dry. Healingadequately.  :   Genitourinary Comments: deferred      Assessment:     Patient state:  Doingwell postoperatively. Operative findings again reviewed. Diagnosis Orders   1. Wound infection  CBC Auto Differential        Pathology reportdiscussed. Plan:     Patient to continue using antibiotics  Return in 2 weeks    Orders Placed This Encounter   Procedures    CBC Auto Differential     Standing Status:   Future     Standing Expiration Date:   10/1/2022     No orders of the defined types were placed in this encounter.       Artem Santacruz MD

## 2021-10-11 ENCOUNTER — TELEPHONE (OUTPATIENT)
Dept: OBGYN CLINIC | Age: 41
End: 2021-10-11

## 2021-10-11 NOTE — TELEPHONE ENCOUNTER
Pt is calling, 3 weeks post op. Having problems with feet swelling still . Inquiring if there is something she can get for the swelling?

## 2021-10-13 NOTE — TELEPHONE ENCOUNTER
I recommend following up in the office. No meds for swelling are typically prescribed without knowing the cause of the swelling.

## 2021-10-13 NOTE — TELEPHONE ENCOUNTER
Patient contacted and made aware of Dr. Jenny Olivera message. She has a post op scheduled for 10/15/21.

## 2021-10-15 ENCOUNTER — OFFICE VISIT (OUTPATIENT)
Dept: OBGYN CLINIC | Age: 41
End: 2021-10-15
Payer: MEDICARE

## 2021-10-15 VITALS
BODY MASS INDEX: 50.03 KG/M2 | WEIGHT: 265 LBS | SYSTOLIC BLOOD PRESSURE: 122 MMHG | DIASTOLIC BLOOD PRESSURE: 76 MMHG | HEIGHT: 61 IN | HEART RATE: 76 BPM

## 2021-10-15 DIAGNOSIS — N76.0 ACUTE VAGINITIS: ICD-10-CM

## 2021-10-15 DIAGNOSIS — R60.0 BILATERAL LOWER EXTREMITY EDEMA: Primary | ICD-10-CM

## 2021-10-15 PROCEDURE — 99213 OFFICE O/P EST LOW 20 MIN: CPT | Performed by: OBSTETRICS & GYNECOLOGY

## 2021-10-15 PROCEDURE — G8417 CALC BMI ABV UP PARAM F/U: HCPCS | Performed by: OBSTETRICS & GYNECOLOGY

## 2021-10-15 PROCEDURE — G8484 FLU IMMUNIZE NO ADMIN: HCPCS | Performed by: OBSTETRICS & GYNECOLOGY

## 2021-10-15 PROCEDURE — G8427 DOCREV CUR MEDS BY ELIG CLIN: HCPCS | Performed by: OBSTETRICS & GYNECOLOGY

## 2021-10-15 PROCEDURE — 1036F TOBACCO NON-USER: CPT | Performed by: OBSTETRICS & GYNECOLOGY

## 2021-10-15 PROCEDURE — 1111F DSCHRG MED/CURRENT MED MERGE: CPT | Performed by: OBSTETRICS & GYNECOLOGY

## 2021-10-15 RX ORDER — FUROSEMIDE 20 MG/1
20 TABLET ORAL 2 TIMES DAILY
Qty: 60 TABLET | Refills: 3 | Status: SHIPPED | OUTPATIENT
Start: 2021-10-15

## 2021-10-15 RX ORDER — FLUCONAZOLE 150 MG/1
TABLET ORAL
Qty: 3 TABLET | Refills: 0 | Status: SHIPPED | OUTPATIENT
Start: 2021-10-15 | End: 2022-01-13

## 2021-10-15 NOTE — PROGRESS NOTES
Carlos Whitt is a 37 y.o. female who presents here today for complaints of Post-Op Check ([facility], bilateral salpingectomy, laparotomy assisted myomectomy 21. c/o swelling in her feet. She states she can only wear house shoes and they're tender to touch.)    C/o of bilateral lower limb swelling . Vaginal itching . No other complaints.    .      Vitals:  /76 (Site: Left Upper Arm, Position: Sitting, Cuff Size: Large Adult)   Pulse 76   Ht 5' 1\" (1.549 m)   Wt 265 lb (120.2 kg)   LMP  (LMP Unknown)   BMI 50.07 kg/m²   Allergies:  Metronidazole and Percocet [oxycodone-acetaminophen]  Past Medical History:   Diagnosis Date    Abnormal Pap smear of cervix     Breast disorder     Fibroid     Hypertension      Past Surgical History:   Procedure Laterality Date    FOOT SURGERY      cyst on right foot as a child    HYSTERECTOMY, VAGINAL N/A 2021    LAPAROSCOPIC HYSTERECTOMY, BILATERAL SALPINGECTOMY, LAPAROTOMY ASSISTED MYOMECTOMY performed by Harleen Madden MD at [address] [place] [address]  2020    Dr Lillian Burns [phone]     OB History        0    Para   0    Term   0       0    AB   0    Living   0       SAB   0    TAB   0    Ectopic   0    Molar   0    Multiple   0    Live Births   0              Family History   Problem Relation Age of Onset    Diabetes Paternal Grandmother     Diabetes Father     Diabetes Paternal Aunt     Diabetes Paternal Uncle     Breast Cancer Neg Hx     Cancer Neg Hx     Colon Cancer Neg Hx     Eclampsia Neg Hx     Ovarian Cancer Neg Hx     Hypertension Neg Hx      Labor Neg Hx     Spont Abortions Neg Hx     Stroke Neg Hx      Social History     Socioeconomic History    Marital status: Single     Spouse name: Not on file    Number of children: Not on file    Years of education: Not on file    Highest education level: Not on file   Occupational History    Not on file   Tobacco Use    Smoking status: Former Smoker     Quit date: 2012     Years since quittin.1    Smokeless tobacco: Never Used   Vaping Use    Vaping Use: Never used   Substance and Sexual Activity    Alcohol use: Yes     Comment: On \"occasion liquor\"    Drug use: No    Sexual activity: Yes     Partners: Male     Comment: Condoms   Other Topics Concern    Not on file   Social History Narrative    Not on file     Social Determinants of Health     Financial Resource Strain:     Difficulty of Paying Living Expenses:    Food Insecurity:     Worried About Running Out of Food in the Last Year:     [address] in the Last Year:    Transportation Needs:     Lack of Transportation (Medical):  Lack of Transportation (Non-Medical):    Physical Activity:     Days of Exercise per Week:     Minutes of Exercise per Session:    Stress:     Feeling of Stress :    Social Connections:     Frequency of Communication with Friends and Family:     Frequency of Social Gatherings with Friends and Family:     Attends Orthodoxy Services:     Active Member of Clubs or Organizations:     Attends Club or Organization Meetings:     Marital Status:    Intimate Partner Violence:     Fear of Current or Ex-Partner:     Emotionally Abused:     Physically Abused:     Sexually Abused:        Contraceptive method:  none    Patient's medications, allergies, past medical, surgical, social and family histories were reviewed and updated as appropriate. Review of Systems  As per chief complaint   All other systems reviewed and are negative. Physical Exam:  Vitals:  /76 (Site: Left Upper Arm, Position: Sitting, Cuff Size: Large Adult)   Pulse 76   Ht 5' 1\" (1.549 m)   Wt 265 lb (120.2 kg)   LMP  (LMP Unknown)   BMI 50.07 kg/m²   Lungs: CTAB   Heart : Regular S1/S2, no M/R/G  Abdomen: Soft , NT, ND , + BS   Incisions : intact , clean and dry . Pelvic exam : deferred    Assessment:      Diagnosis Orders   1.  Bilateral lower extremity edema     2. Acute vaginitis         Plan:     Recommended referal to PCP   Lasix prescribed to be used temporarily. No orders of the defined types were placed in this encounter. Orders Placed This Encounter   Medications    furosemide (LASIX) 20 MG tablet     Sig: Take 1 tablet by mouth 2 times daily     Dispense:  60 tablet     Refill:  3    fluconazole (DIFLUCAN) 150 MG tablet     Sig: Take 1 tablet every 2 days for 3 doses. Dispense:  3 tablet     Refill:  0       Follow Up:  Return in about 2 weeks (around 10/29/2021) for F/U for results.         Sis Natarajan MD

## 2021-11-05 ENCOUNTER — OFFICE VISIT (OUTPATIENT)
Dept: OBGYN CLINIC | Age: 41
End: 2021-11-05

## 2021-11-05 VITALS
BODY MASS INDEX: 49.09 KG/M2 | WEIGHT: 260 LBS | HEIGHT: 61 IN | SYSTOLIC BLOOD PRESSURE: 128 MMHG | DIASTOLIC BLOOD PRESSURE: 80 MMHG | HEART RATE: 80 BPM

## 2021-11-05 PROCEDURE — G8417 CALC BMI ABV UP PARAM F/U: HCPCS | Performed by: OBSTETRICS & GYNECOLOGY

## 2021-11-05 PROCEDURE — 99024 POSTOP FOLLOW-UP VISIT: CPT | Performed by: OBSTETRICS & GYNECOLOGY

## 2021-11-05 PROCEDURE — G8427 DOCREV CUR MEDS BY ELIG CLIN: HCPCS | Performed by: OBSTETRICS & GYNECOLOGY

## 2021-11-05 PROCEDURE — 1036F TOBACCO NON-USER: CPT | Performed by: OBSTETRICS & GYNECOLOGY

## 2021-11-05 PROCEDURE — G8484 FLU IMMUNIZE NO ADMIN: HCPCS | Performed by: OBSTETRICS & GYNECOLOGY

## 2021-11-05 RX ORDER — FLUCONAZOLE 150 MG/1
150 TABLET ORAL ONCE
Qty: 1 TABLET | Refills: 0 | Status: SHIPPED | OUTPATIENT
Start: 2021-11-05 | End: 2021-11-05

## 2021-11-05 RX ORDER — NITROFURANTOIN 25; 75 MG/1; MG/1
100 CAPSULE ORAL 2 TIMES DAILY
Qty: 20 CAPSULE | Refills: 0 | Status: SHIPPED | OUTPATIENT
Start: 2021-11-05 | End: 2021-11-15

## 2021-11-05 NOTE — PROGRESS NOTES
History    Not on file   Tobacco Use    Smoking status: Former Smoker     Quit date: 2012     Years since quittin.2    Smokeless tobacco: Never Used   Vaping Use    Vaping Use: Never used   Substance and Sexual Activity    Alcohol use: Yes     Comment: On \"occasion liquor\"    Drug use: No    Sexual activity: Yes     Partners: Male     Comment: Condoms   Other Topics Concern    Not on file   Social History Narrative    Not on file     Social Determinants of Health     Financial Resource Strain:     Difficulty of Paying Living Expenses:    Food Insecurity:     Worried About Running Out of Food in the Last Year:     [address] in the Last Year:    Transportation Needs:     Lack of Transportation (Medical):  Lack of Transportation (Non-Medical):    Physical Activity:     Days of Exercise per Week:     Minutes of Exercise per Session:    Stress:     Feeling of Stress :    Social Connections:     Frequency of Communication with Friends and Family:     Frequency of Social Gatherings with Friends and Family:     Attends Holiness Services:     Active Member of Clubs or Organizations:     Attends Club or Organization Meetings:     Marital Status:    Intimate Partner Violence:     Fear of Current or Ex-Partner:     Emotionally Abused:     Physically Abused:     Sexually Abused:        Contraceptive method:  none    Patient's medications, allergies, past medical, surgical,social and family histories were reviewed and updated as appropriate. Review of Systems  Review of Systems   All other systems reviewed and are negative. Review of Systems  Constitutional: Negative for chills and fever. Respiratory: Negative for coughand shortness of breath. Cardiovascular: Negative for chestpain and palpitations. Gastrointestinal: Negative for nauseaand vomiting. Genitourinary: Negative for dysuria, frequencyand urgency. Musculoskeletal: Negative for myalgias.    Neurological: Negative for dizziness, seizures andheadaches. Psychiatric/Behavioral: Negativefor depression and suicidal ideas. Exam  Physical Exam  Exam   Constitutional: Sheis well-developed, well-nourished, and in no distress. Cardiovascular: Normal rate and regularrhythm. Pulmonary/Chest: Effort normal and breath sounds normal.  Abdominal: Soft. Bowel sounds are normal.   Incision/s intact clean and dry. Healingadequately.  :   Genitourinary Comments: vaginal cuff healed completely. No abnormal vaginal discharge. Normal vaginal mucosa . Normal vulva , normal urethera. Assessment:     Patient state:  Doingwell postoperatively. Operative findings again reviewed. Diagnosis Orders   1. BMI 45.0-49.9, adult ([facility] [address]  CELESTINA Houston MD, Bariatrics, *Must also have referral to MD CELESTINA Mejia MD, Bariatrics, *Must also have referral to Bambi Manjarrez MD        Pathology reportdiscussed.         Plan:     [facility]   UTi rx   Return on next annual   2 wks off work   Return to normal activity     Orders Placed This Encounter   Procedures   Khurram Aparicio MD, Lupe Ayala, *Must also have referral to Margret Del Rosario MD     Referral Priority:   Routine     Referral Type:   Eval and Treat     Referral Reason:   Specialty Services Required     Referral Location:   [address] [place] [address]     Referred to Provider:   Santosh Brooks     Requested Specialty:   General Surgery     Number of Visits Requested:   1   Bishop Garcia MD, Lupe Ayala, *Must also have referral to Bambi Manjarrez MD     Referral Priority:   Routine     Referral Type:   Eval and Treat     Referral Reason:   Specialty Services Required     Referral Location:   [facility]     Referred to Provider:   Brittany West MD     Requested Specialty:   Internal Medicine     Number of Visits Requested:   1     Orders Placed This Encounter Medications    nitrofurantoin, macrocrystal-monohydrate, (MACROBID) 100 MG capsule     Sig: Take 1 capsule by mouth 2 times daily for 10 days     Dispense:  20 capsule     Refill:  0    fluconazole (DIFLUCAN) 150 MG tablet     Sig: Take 1 tablet by mouth once for 1 dose     Dispense:  1 tablet     Refill:  0       Marbin Moody MD

## 2022-01-05 ENCOUNTER — NURSE ONLY (OUTPATIENT)
Dept: PRIMARY CARE CLINIC | Age: 42
End: 2022-01-05

## 2022-01-05 DIAGNOSIS — Z20.822 EXPOSURE TO COVID-19 VIRUS: Primary | ICD-10-CM

## 2022-01-05 LAB
Lab: NORMAL
PERFORMING INSTRUMENT: NORMAL
QC PASS/FAIL: NORMAL
SARS-COV-2, POC: NORMAL

## 2022-01-05 PROCEDURE — 87426 SARSCOV CORONAVIRUS AG IA: CPT | Performed by: NURSE PRACTITIONER

## 2022-01-13 ENCOUNTER — APPOINTMENT (OUTPATIENT)
Dept: CT IMAGING | Age: 42
End: 2022-01-13
Payer: MEDICARE

## 2022-01-13 ENCOUNTER — HOSPITAL ENCOUNTER (EMERGENCY)
Age: 42
Discharge: HOME | End: 2022-01-13
Attending: STUDENT IN AN ORGANIZED HEALTH CARE EDUCATION/TRAINING PROGRAM
Payer: MEDICARE

## 2022-01-13 VITALS
DIASTOLIC BLOOD PRESSURE: 91 MMHG | HEART RATE: 76 BPM | RESPIRATION RATE: 16 BRPM | HEIGHT: 61 IN | OXYGEN SATURATION: 97 % | TEMPERATURE: 99.3 F | WEIGHT: 260 LBS | SYSTOLIC BLOOD PRESSURE: 159 MMHG | BODY MASS INDEX: 49.09 KG/M2

## 2022-01-13 DIAGNOSIS — K42.9 UMBILICAL HERNIA WITHOUT OBSTRUCTION AND WITHOUT GANGRENE: ICD-10-CM

## 2022-01-13 DIAGNOSIS — N83.201 BILATERAL OVARIAN CYSTS: Primary | ICD-10-CM

## 2022-01-13 DIAGNOSIS — R10.32 ABDOMINAL PAIN, LEFT LOWER QUADRANT: ICD-10-CM

## 2022-01-13 DIAGNOSIS — N83.202 BILATERAL OVARIAN CYSTS: Primary | ICD-10-CM

## 2022-01-13 DIAGNOSIS — I10 CHRONIC HYPERTENSION: ICD-10-CM

## 2022-01-13 LAB
ALBUMIN SERPL-MCNC: 3.5 G/DL (ref 3.5–4.6)
ALP BLD-CCNC: 66 U/L (ref 40–130)
ALT SERPL-CCNC: 25 U/L (ref 0–33)
ANION GAP SERPL CALCULATED.3IONS-SCNC: 8 MEQ/L (ref 9–15)
AST SERPL-CCNC: 32 U/L (ref 0–35)
BASOPHILS ABSOLUTE: 0 K/UL (ref 0–0.2)
BASOPHILS RELATIVE PERCENT: 0.6 %
BILIRUB SERPL-MCNC: 0.4 MG/DL (ref 0.2–0.7)
BILIRUBIN URINE: NEGATIVE
BLOOD, URINE: NEGATIVE
BUN BLDV-MCNC: 9 MG/DL (ref 6–20)
CALCIUM SERPL-MCNC: 9 MG/DL (ref 8.5–9.9)
CHLORIDE BLD-SCNC: 97 MEQ/L (ref 95–107)
CLARITY: CLEAR
CO2: 24 MEQ/L (ref 20–31)
COLOR: YELLOW
CREAT SERPL-MCNC: 0.6 MG/DL (ref 0.5–0.9)
EOSINOPHILS ABSOLUTE: 0.1 K/UL (ref 0–0.7)
EOSINOPHILS RELATIVE PERCENT: 1.5 %
GFR AFRICAN AMERICAN: >60
GFR NON-AFRICAN AMERICAN: >60
GLOBULIN: 3.9 G/DL (ref 2.3–3.5)
GLUCOSE BLD-MCNC: 117 MG/DL (ref 70–99)
GLUCOSE URINE: NEGATIVE MG/DL
HCT VFR BLD CALC: 38.6 % (ref 37–47)
HEMOGLOBIN: 12.5 G/DL (ref 12–16)
KETONES, URINE: NEGATIVE MG/DL
LACTIC ACID: 1.6 MMOL/L (ref 0.5–2.2)
LEUKOCYTE ESTERASE, URINE: NEGATIVE
LIPASE: 18 U/L (ref 12–95)
LYMPHOCYTES ABSOLUTE: 1.9 K/UL (ref 1–4.8)
LYMPHOCYTES RELATIVE PERCENT: 36.2 %
MCH RBC QN AUTO: 27.7 PG (ref 27–31.3)
MCHC RBC AUTO-ENTMCNC: 32.4 % (ref 33–37)
MCV RBC AUTO: 85.3 FL (ref 82–100)
MONOCYTES ABSOLUTE: 0.5 K/UL (ref 0.2–0.8)
MONOCYTES RELATIVE PERCENT: 8.9 %
NEUTROPHILS ABSOLUTE: 2.8 K/UL (ref 1.4–6.5)
NEUTROPHILS RELATIVE PERCENT: 52.8 %
NITRITE, URINE: NEGATIVE
PDW BLD-RTO: 15.3 % (ref 11.5–14.5)
PH UA: 7 (ref 5–9)
PLATELET # BLD: 213 K/UL (ref 130–400)
POTASSIUM SERPL-SCNC: 3.7 MEQ/L (ref 3.4–4.9)
PROTEIN UA: NEGATIVE MG/DL
RBC # BLD: 4.52 M/UL (ref 4.2–5.4)
SODIUM BLD-SCNC: 129 MEQ/L (ref 135–144)
SPECIFIC GRAVITY UA: 1.01 (ref 1–1.03)
TOTAL PROTEIN: 7.4 G/DL (ref 6.3–8)
URINE REFLEX TO CULTURE: NORMAL
UROBILINOGEN, URINE: 0.2 E.U./DL
WBC # BLD: 5.2 K/UL (ref 4.8–10.8)

## 2022-01-13 PROCEDURE — 99284 EMERGENCY DEPT VISIT MOD MDM: CPT

## 2022-01-13 PROCEDURE — 6360000002 HC RX W HCPCS: Performed by: STUDENT IN AN ORGANIZED HEALTH CARE EDUCATION/TRAINING PROGRAM

## 2022-01-13 PROCEDURE — 83605 ASSAY OF LACTIC ACID: CPT

## 2022-01-13 PROCEDURE — 96374 THER/PROPH/DIAG INJ IV PUSH: CPT

## 2022-01-13 PROCEDURE — 83690 ASSAY OF LIPASE: CPT

## 2022-01-13 PROCEDURE — 85025 COMPLETE CBC W/AUTO DIFF WBC: CPT

## 2022-01-13 PROCEDURE — 80053 COMPREHEN METABOLIC PANEL: CPT

## 2022-01-13 PROCEDURE — 81003 URINALYSIS AUTO W/O SCOPE: CPT

## 2022-01-13 PROCEDURE — 74150 CT ABDOMEN W/O CONTRAST: CPT

## 2022-01-13 RX ORDER — KETOROLAC TROMETHAMINE 15 MG/ML
30 INJECTION, SOLUTION INTRAMUSCULAR; INTRAVENOUS ONCE
Status: COMPLETED | OUTPATIENT
Start: 2022-01-13 | End: 2022-01-13

## 2022-01-13 RX ORDER — LISINOPRIL 20 MG/1
20 TABLET ORAL DAILY
Qty: 30 TABLET | Refills: 0 | Status: SHIPPED | OUTPATIENT
Start: 2022-01-13 | End: 2022-02-12

## 2022-01-13 RX ORDER — NAPROXEN 500 MG/1
500 TABLET ORAL 2 TIMES DAILY
Qty: 20 TABLET | Refills: 0 | Status: SHIPPED | OUTPATIENT
Start: 2022-01-13 | End: 2022-01-23

## 2022-01-13 RX ORDER — FAMOTIDINE 20 MG/1
20 TABLET, FILM COATED ORAL 2 TIMES DAILY
Qty: 60 TABLET | Refills: 0 | Status: SHIPPED | OUTPATIENT
Start: 2022-01-13 | End: 2022-01-25

## 2022-01-13 RX ORDER — DICYCLOMINE HYDROCHLORIDE 10 MG/1
10 CAPSULE ORAL EVERY 6 HOURS PRN
Qty: 20 CAPSULE | Refills: 0 | Status: SHIPPED | OUTPATIENT
Start: 2022-01-13 | End: 2022-01-25

## 2022-01-13 RX ADMIN — KETOROLAC TROMETHAMINE 30 MG: 15 INJECTION, SOLUTION INTRAMUSCULAR; INTRAVENOUS at 11:02

## 2022-01-13 ASSESSMENT — ENCOUNTER SYMPTOMS
BACK PAIN: 0
SINUS PRESSURE: 0
COUGH: 0
CHEST TIGHTNESS: 0
TROUBLE SWALLOWING: 0
BLOOD IN STOOL: 0
VOMITING: 0
ABDOMINAL PAIN: 1
ABDOMINAL DISTENTION: 0
DIARRHEA: 0
SHORTNESS OF BREATH: 0

## 2022-01-13 ASSESSMENT — PAIN DESCRIPTION - FREQUENCY
FREQUENCY: CONTINUOUS
FREQUENCY: INTERMITTENT

## 2022-01-13 ASSESSMENT — PAIN SCALES - GENERAL
PAINLEVEL_OUTOF10: 3
PAINLEVEL_OUTOF10: 9

## 2022-01-13 ASSESSMENT — PAIN DESCRIPTION - DESCRIPTORS
DESCRIPTORS: SHARP
DESCRIPTORS: SHARP

## 2022-01-13 ASSESSMENT — PAIN DESCRIPTION - LOCATION
LOCATION: BACK;ABDOMEN
LOCATION: ABDOMEN

## 2022-01-13 ASSESSMENT — PAIN DESCRIPTION - PAIN TYPE
TYPE: ACUTE PAIN
TYPE: ACUTE PAIN;CHRONIC PAIN

## 2022-01-13 ASSESSMENT — PAIN DESCRIPTION - ORIENTATION
ORIENTATION: LEFT;LOWER
ORIENTATION: LEFT;LOWER

## 2022-01-13 ASSESSMENT — PAIN DESCRIPTION - ONSET: ONSET: SUDDEN

## 2022-01-13 NOTE — ED PROVIDER NOTES
Neurological: Negative for syncope, weakness and headaches. Hematological: Does not bruise/bleed easily. All other systems reviewed and are negative. Except as noted above the remainder of the review of systems was reviewed and negative. PAST MEDICAL HISTORY     Past Medical History:   Diagnosis Date    Abnormal Pap smear of cervix     Breast disorder     Fibroid     Hypertension          SURGICALHISTORY       Past Surgical History:   Procedure Laterality Date    FOOT SURGERY      cyst on right foot as a child    HYSTERECTOMY, VAGINAL N/A 2021    LAPAROSCOPIC HYSTERECTOMY, BILATERAL SALPINGECTOMY, LAPAROTOMY ASSISTED MYOMECTOMY performed by Eliu Pryor MD at [address] [place] [address]  2020    Dr Farooq Owen [phone]         CURRENT MEDICATIONS       Previous Medications    ACETAMINOPHEN (APAP EXTRA STRENGTH) 500 MG TABLET    Take 2 tablets by mouth every 6 hours as needed for Pain    ATENOLOL (TENORMIN) 25 MG TABLET    TAKE 1 TABLET BY MOUTH DAILY    FUROSEMIDE (LASIX) 20 MG TABLET    Take 1 tablet by mouth 2 times daily    GABAPENTIN (NEURONTIN) 600 MG TABLET    Take 600 mg by mouth 2 times daily as needed.      IBUPROFEN (ADVIL;MOTRIN) 800 MG TABLET    Take 1 tablet by mouth every 6 hours as needed for Pain    IBUPROFEN (ADVIL;MOTRIN) 800 MG TABLET    Take 1 tablet by mouth every 6 hours as needed for Pain       ALLERGIES     Metronidazole and Percocet [oxycodone-acetaminophen]    FAMILY HISTORY       Family History   Problem Relation Age of Onset    Diabetes Paternal Grandmother     Diabetes Father     Diabetes Paternal Aunt     Diabetes Paternal Uncle     Breast Cancer Neg Hx     Cancer Neg Hx     Colon Cancer Neg Hx     Eclampsia Neg Hx     Ovarian Cancer Neg Hx     Hypertension Neg Hx      Labor Neg Hx     Spont Abortions Neg Hx     Stroke Neg Hx           SOCIAL HISTORY       Social History     Socioeconomic History    Marital status: Single     Spouse name: None    Number of children: None    Years of education: None    Highest education level: None   Occupational History    None   Tobacco Use    Smoking status: Former Smoker     Quit date: 2012     Years since quittin.4    Smokeless tobacco: Never Used   Vaping Use    Vaping Use: Never used   Substance and Sexual Activity    Alcohol use: Yes     Comment: On \"occasion liquor\"    Drug use: No    Sexual activity: Yes     Partners: Male     Comment: Condoms   Other Topics Concern    None   Social History Narrative    None     Social Determinants of Health     Financial Resource Strain:     Difficulty of Paying Living Expenses: Not on file   Food Insecurity:     Worried About Running Out of Food in the Last Year: Not on file    Adali of Food in the Last Year: Not on file   Transportation Needs:     Lack of Transportation (Medical): Not on file    Lack of Transportation (Non-Medical):  Not on file   Physical Activity:     Days of Exercise per Week: Not on file    Minutes of Exercise per Session: Not on file   Stress:     Feeling of Stress : Not on file   Social Connections:     Frequency of Communication with Friends and Family: Not on file    Frequency of Social Gatherings with Friends and Family: Not on file    Attends Protestant Services: Not on file    Active Member of [address] [place] [address] or Organizations: Not on file    Attends Club or Organization Meetings: Not on file    Marital Status: Not on file   Intimate Partner Violence:     Fear of Current or Ex-Partner: Not on file    Emotionally Abused: Not on file    Physically Abused: Not on file    Sexually Abused: Not on file   Housing Stability:     Unable to Pay for Housing in the Last Year: Not on file    Number of [place] in the Last Year: Not on file    Unstable Housing in the Last Year: Not on file       SCREENINGS      @FLOW([id number])@      PHYSICAL EXAM    (up to 7 for level 4, 8 or more for level splenomegaly, mass or pulsatile mass. Tenderness: There is abdominal tenderness in the left lower quadrant. There is left CVA tenderness and guarding. There is no right CVA tenderness or rebound. Negative signs include Lopez's sign and McBurney's sign. Hernia: No hernia is present. Musculoskeletal:         General: No swelling, tenderness, deformity or signs of injury. Normal range of motion. Cervical back: Normal range of motion and neck supple. No rigidity. Lymphadenopathy:      Head:      Right side of head: No submental adenopathy. Left side of head: No submental adenopathy. Skin:     General: Skin is warm and dry. Capillary Refill: Capillary refill takes less than 2 seconds. Coloration: Skin is not jaundiced or pale. Findings: No bruising, erythema, lesion or rash. Neurological:      General: No focal deficit present. Mental Status: She is alert and oriented to person, place, and time. Mental status is at baseline. Cranial Nerves: No cranial nerve deficit. Sensory: No sensory deficit. Motor: No weakness. Coordination: Coordination normal.      Deep Tendon Reflexes: Reflexes are normal and symmetric. Psychiatric:         Mood and Affect: Mood normal.         Behavior: Behavior normal. Behavior is cooperative. Thought Content:  Thought content normal.         Judgment: Judgment normal.         DIAGNOSTIC RESULTS     EKG: All EKG's are interpreted by the Emergency Department Physician who either signs or Co-signsthis chart in the absence of a cardiologist.        RADIOLOGY:   Emmy August such as CT, Ultrasound and MRI are read by the radiologist. Plain radiographic images are visualized and preliminarily interpreted by the emergency physician with the below findings:        Interpretation per the Radiologist below, if available at the time ofthis note:    CT KIDNEY WO CONTRAST   Final Result   Impression: UNREMARKABLE CT SCAN OF THE ABDOMEN AND PELVIS AS DESCRIBED ABOVE         All CT scans at this facility use dose modulation, iterative reconstruction, and/or weight based dosing when appropriate to reduce radiation dose to as low as reasonably achievable. ED BEDSIDE ULTRASOUND:   Performed by ED Physician - none    LABS:  Labs Reviewed   CBC WITH AUTO DIFFERENTIAL - Abnormal; Notable for the following components:       Result Value    MCHC 32.4 (*)     RDW 15.3 (*)     All other components within normal limits   COMPREHENSIVE METABOLIC PANEL - Abnormal; Notable for the following components:    Sodium 129 (*)     Anion Gap 8 (*)     Glucose 117 (*)     Globulin 3.9 (*)     All other components within normal limits   URINE RT REFLEX TO CULTURE   LIPASE   LACTIC ACID, PLASMA       All other labs were within normal range or not returned as of this dictation. EMERGENCY DEPARTMENT COURSE and DIFFERENTIAL DIAGNOSIS/MDM:   Vitals:    Vitals:    01/13/22 1007 01/13/22 1042   BP: (!) 177/110 (!) 170/98   Pulse: 79 77   Resp: 18 20   Temp: 98.1 °F (36.7 °C) 99.3 °F (37.4 °C)   TempSrc: Oral Oral   SpO2:  97%   Weight: 260 lb (117.9 kg)    Height: 5' 1\" (1.549 m)            MDM  Patient did not take her blood pressure medicines. She needs a refill of lisinopril. The ED attending will write that. That accounts for elevated blood pressure. Patient had no chest pain, shortness of breath or headache or visual changes. Patient did have via ovarian cyst seen on CT which may be the etiology of the left-sided pain. Patient also has a fair amount of feces seen in the lower colon with the ER physician reviewed the CAT scan findings. No bowel obstruction. No diverticulitis. No kidney stone. Urinalysis indicates there is no urinary tract infection. Patient has no medications at home for pain and the ibuprofen that was listed as a home med will be removed.   ED attending spoke with OB/GYN on-call and patient can follow-up with  [facility] in an outpatient basis. Patient incidentally has an umbilical hernia unrelated to her current condition and she will be given the name of a general surgeon to follow-up in outpatient basis she wants that taken care of. Patient encouraged to increase her water consumption in order to prevent constipation. Prescription for pain medication and instructed to return to the ER if worsening symptoms. The findings were discussed with the patient. The patient was invited to return  to the ER if worse symptoms. The patient verbalized understanding of the care and they have no further questions. CONSULTS:  None    PROCEDURES:  Unless otherwise noted below, none     Procedures    FINAL IMPRESSION      1. Bilateral ovarian cysts    2. Abdominal pain, left lower quadrant          DISPOSITION/PLAN   DISPOSITION Decision To Discharge 01/13/2022 12:40:13 PM      PATIENT REFERRED TO:  No follow-up provider specified.     DISCHARGE MEDICATIONS:  New Prescriptions    No medications on file          (Please note that portions of this note were completed with a voice recognition program.  Efforts were made to edit the dictations but occasionally words are mis-transcribed.)    Donovan Hopkins DO (electronically signed)  Attending Emergency Physician        Donovan Hopkins DO  01/13/22 7455

## 2022-01-13 NOTE — Clinical Note
Magalys Gurrola was seen and treated in our emergency department on 1/13/2022. She may return to work on 01/17/2022. If you have any questions or concerns, please don't hesitate to call.       Esteban Tao, DO

## 2022-01-13 NOTE — ED TRIAGE NOTES
Pt states that she has Abd LLQ pain and back pain  Pt denies any mechanism of injury   Pt denies that these are related to one another  Pt states that mid back pain is chronic due to breast size  Pt states that LLQ pain started in the last 24 hrs  Pt is alert and oriented at this time  Pt denies any CP

## 2022-01-13 NOTE — ED TRIAGE NOTES
Pt stating that her pain started suddenly this morning in her LLQ. Pt denies any pain with urination or BM. Pt having tenderness with LLQ palpation and states that the pain radiates to her back. She rates that pain 9/10. MD at bedside for assessment and plan of care.

## 2022-01-25 ENCOUNTER — OFFICE VISIT (OUTPATIENT)
Dept: OBGYN CLINIC | Age: 42
End: 2022-01-25
Payer: MEDICARE

## 2022-01-25 VITALS
HEIGHT: 61 IN | BODY MASS INDEX: 49.84 KG/M2 | SYSTOLIC BLOOD PRESSURE: 138 MMHG | HEART RATE: 80 BPM | DIASTOLIC BLOOD PRESSURE: 84 MMHG | WEIGHT: 264 LBS

## 2022-01-25 DIAGNOSIS — N83.201 BILATERAL OVARIAN CYSTS: ICD-10-CM

## 2022-01-25 DIAGNOSIS — N83.202 BILATERAL OVARIAN CYSTS: ICD-10-CM

## 2022-01-25 DIAGNOSIS — R10.32 LLQ PAIN: ICD-10-CM

## 2022-01-25 DIAGNOSIS — Z76.89 ENCOUNTER TO ESTABLISH CARE WITH NEW DOCTOR: Primary | ICD-10-CM

## 2022-01-25 PROCEDURE — G8427 DOCREV CUR MEDS BY ELIG CLIN: HCPCS | Performed by: OBSTETRICS & GYNECOLOGY

## 2022-01-25 PROCEDURE — G8484 FLU IMMUNIZE NO ADMIN: HCPCS | Performed by: OBSTETRICS & GYNECOLOGY

## 2022-01-25 PROCEDURE — 1111F DSCHRG MED/CURRENT MED MERGE: CPT | Performed by: OBSTETRICS & GYNECOLOGY

## 2022-01-25 PROCEDURE — G8417 CALC BMI ABV UP PARAM F/U: HCPCS | Performed by: OBSTETRICS & GYNECOLOGY

## 2022-01-25 PROCEDURE — 1036F TOBACCO NON-USER: CPT | Performed by: OBSTETRICS & GYNECOLOGY

## 2022-01-25 PROCEDURE — 99213 OFFICE O/P EST LOW 20 MIN: CPT | Performed by: OBSTETRICS & GYNECOLOGY

## 2022-01-25 RX ORDER — NICOTINE POLACRILEX 4 MG/1
20 GUM, CHEWING ORAL DAILY
COMMUNITY

## 2022-01-25 NOTE — PROGRESS NOTES
Sol Brumfield is a 37 y.o. female who presents here today for complaints of Follow-Up from [facility], [facility] 22 for LLQ pain. CT done. )    Pain resolved. CT unremarkable.        Vitals:  /84 (Site: Right Upper Arm, Position: Sitting, Cuff Size: Large Adult)   Pulse 80   Ht 5' 1\" (1.549 m)   Wt 264 lb (119.7 kg)   LMP  (LMP Unknown)   BMI 49.88 kg/m²   Allergies:  Metronidazole and Percocet [oxycodone-acetaminophen]  Past Medical History:   Diagnosis Date    Abnormal Pap smear of cervix     Breast disorder     Fibroid     Hypertension      Past Surgical History:   Procedure Laterality Date    FOOT SURGERY      cyst on right foot as a child    HYSTERECTOMY, VAGINAL N/A 2021    LAPAROSCOPIC HYSTERECTOMY, BILATERAL SALPINGECTOMY, LAPAROTOMY ASSISTED MYOMECTOMY performed by Katerin Canseco MD at [address] [place] [address]  2020    Dr Radha Ohara [phone]     OB History        0    Para   0    Term   0       0    AB   0    Living   0       SAB   0    IAB   0    Ectopic   0    Molar   0    Multiple   0    Live Births   0              Family History   Problem Relation Age of Onset    Diabetes Paternal Grandmother     Diabetes Father     Diabetes Paternal Aunt     Diabetes Paternal Uncle     Breast Cancer Neg Hx     Cancer Neg Hx     Colon Cancer Neg Hx     Eclampsia Neg Hx     Ovarian Cancer Neg Hx     Hypertension Neg Hx      Labor Neg Hx     Spont Abortions Neg Hx     Stroke Neg Hx      Social History     Socioeconomic History    Marital status: Single     Spouse name: Not on file    Number of children: Not on file    Years of education: Not on file    Highest education level: Not on file   Occupational History    Not on file   Tobacco Use    Smoking status: Former Smoker     Quit date: 2012     Years since quittin.4    Smokeless tobacco: Never Used   Vaping Use    Vaping Use: Never used   Substance and Sexual Activity    Alcohol use: Yes     Comment: On \"occasion liquor\"    Drug use: No    Sexual activity: Yes     Partners: Male     Comment: Condoms   Other Topics Concern    Not on file   Social History Narrative    Not on file     Social Determinants of Health     Financial Resource Strain:     Difficulty of Paying Living Expenses: Not on file   Food Insecurity:     Worried About Running Out of Food in the Last Year: Not on file    Adali of Food in the Last Year: Not on file   Transportation Needs:     Lack of Transportation (Medical): Not on file    Lack of Transportation (Non-Medical): Not on file   Physical Activity:     Days of Exercise per Week: Not on file    Minutes of Exercise per Session: Not on file   Stress:     Feeling of Stress : Not on file   Social Connections:     Frequency of Communication with Friends and Family: Not on file    Frequency of Social Gatherings with Friends and Family: Not on file    Attends Restorationist Services: Not on file    Active Member of [address] [place] [address] or Organizations: Not on file    Attends Club or Organization Meetings: Not on file    Marital Status: Not on file   Intimate Partner Violence:     Fear of Current or Ex-Partner: Not on file    Emotionally Abused: Not on file    Physically Abused: Not on file    Sexually Abused: Not on file   Housing Stability:     Unable to Pay for Housing in the Last Year: Not on file    Number of [place] in the Last Year: Not on file    Unstable Housing in the Last Year: Not on file       Contraceptive method:  status post hysterectomy    Patient's medications, allergies, past medical, surgical, social and family histories were reviewed and updated as appropriate. Review of Systems  As per chief complaint   All other systems reviewed and are negative.     Physical Exam:  Vitals:  /84 (Site: Right Upper Arm, Position: Sitting, Cuff Size: Large Adult)   Pulse 80   Ht 5' 1\" (1.549 m)   Wt 264 lb (119.7 kg) LMP  (LMP Unknown)   BMI 49.88 kg/m²   Lungs: CTAB   Heart : Regular S1/S2, no M/R/G  Abdomen: Soft , NT, ND , + BS   Pelvic exam : deferred    Assessment:      Diagnosis Orders   1. Encounter to establish care with new doctor  Ambulatory referral to [facility]   2. LLQ pain  US PELVIS COMPLETE    US NON OB TRANSVAGINAL   3. Bilateral ovarian cysts         Plan:     US   Return for follow up      Orders Placed This Encounter   Procedures    US PELVIS COMPLETE     Standing Status:   Future     Standing Expiration Date:   1/25/2023     Order Specific Question:   Reason for exam:     Answer:   LLQ pain, bilateral ovarian cysts    US NON OB TRANSVAGINAL     Standing Status:   Future     Standing Expiration Date:   1/25/2023     Order Specific Question:   Reason for exam:     Answer:   LLQ pain, bilateral ovarian cysts    Ambulatory referral to Family Practice     Referral Priority:   Routine     Referral Type:   Consult for Advice and Opinion     Number of Visits Requested:   1     No orders of the defined types were placed in this encounter. Follow Up:  No follow-ups on file.         Marlene Alanis MD

## 2022-02-15 NOTE — TELEPHONE ENCOUNTER
Pt called to reschedule surgery to 12/10/20. She is unable to do the surgery at this time due to a conflict with work.      Rescheduled to 12/10/20 wit PAT 12/3/20 @ 10:00 am.   Will contact pt in November with information
Statement Selected

## 2022-07-07 ENCOUNTER — HOSPITAL ENCOUNTER (EMERGENCY)
Age: 42
Discharge: HOME | End: 2022-07-07
Payer: MEDICARE

## 2022-07-07 ENCOUNTER — APPOINTMENT (OUTPATIENT)
Dept: CT IMAGING | Age: 42
End: 2022-07-07
Payer: MEDICARE

## 2022-07-07 VITALS
HEART RATE: 70 BPM | WEIGHT: 275 LBS | HEIGHT: 61 IN | SYSTOLIC BLOOD PRESSURE: 140 MMHG | DIASTOLIC BLOOD PRESSURE: 82 MMHG | RESPIRATION RATE: 18 BRPM | TEMPERATURE: 98.2 F | BODY MASS INDEX: 51.92 KG/M2 | OXYGEN SATURATION: 97 %

## 2022-07-07 DIAGNOSIS — R10.32 ABDOMINAL PAIN, LEFT LOWER QUADRANT: Primary | ICD-10-CM

## 2022-07-07 LAB
ALBUMIN SERPL-MCNC: 3.8 G/DL (ref 3.5–4.6)
ALP BLD-CCNC: 74 U/L (ref 40–130)
ALT SERPL-CCNC: 31 U/L (ref 0–33)
ANION GAP SERPL CALCULATED.3IONS-SCNC: 11 MEQ/L (ref 9–15)
AST SERPL-CCNC: 32 U/L (ref 0–35)
BASOPHILS ABSOLUTE: 0.1 K/UL (ref 0–0.2)
BASOPHILS RELATIVE PERCENT: 1.2 %
BILIRUB SERPL-MCNC: <0.2 MG/DL (ref 0.2–0.7)
BILIRUBIN URINE: NEGATIVE
BLOOD, URINE: NEGATIVE
BUN BLDV-MCNC: 12 MG/DL (ref 6–20)
CALCIUM SERPL-MCNC: 9.1 MG/DL (ref 8.5–9.9)
CHLORIDE BLD-SCNC: 101 MEQ/L (ref 95–107)
CLARITY: CLEAR
CO2: 24 MEQ/L (ref 20–31)
COLOR: YELLOW
CREAT SERPL-MCNC: 0.66 MG/DL (ref 0.5–0.9)
EOSINOPHILS ABSOLUTE: 0.1 K/UL (ref 0–0.7)
EOSINOPHILS RELATIVE PERCENT: 1.3 %
GFR AFRICAN AMERICAN: >60
GFR AFRICAN AMERICAN: >60
GFR NON-AFRICAN AMERICAN: >60
GFR NON-AFRICAN AMERICAN: >60
GLOBULIN: 3.4 G/DL (ref 2.3–3.5)
GLUCOSE BLD-MCNC: 166 MG/DL (ref 70–99)
GLUCOSE URINE: >=1000 MG/DL
HCT VFR BLD CALC: 39.2 % (ref 37–47)
HEMOGLOBIN: 13.1 G/DL (ref 12–16)
KETONES, URINE: NEGATIVE MG/DL
LEUKOCYTE ESTERASE, URINE: NEGATIVE
LIPASE: 32 U/L (ref 12–95)
LYMPHOCYTES ABSOLUTE: 1.9 K/UL (ref 1–4.8)
LYMPHOCYTES RELATIVE PERCENT: 32.8 %
MCH RBC QN AUTO: 28.3 PG (ref 27–31.3)
MCHC RBC AUTO-ENTMCNC: 33.4 % (ref 33–37)
MCV RBC AUTO: 84.6 FL (ref 82–100)
MONOCYTES ABSOLUTE: 0.4 K/UL (ref 0.2–0.8)
MONOCYTES RELATIVE PERCENT: 6.5 %
NEUTROPHILS ABSOLUTE: 3.3 K/UL (ref 1.4–6.5)
NEUTROPHILS RELATIVE PERCENT: 58.2 %
NITRITE, URINE: NEGATIVE
PDW BLD-RTO: 14.5 % (ref 11.5–14.5)
PERFORMED ON: NORMAL
PH UA: 6.5 (ref 5–9)
PLATELET # BLD: 200 K/UL (ref 130–400)
POC CREATININE WHOLE BLOOD: 0.7
POC CREATININE: 0.7 MG/DL (ref 0.6–1.2)
POC SAMPLE TYPE: NORMAL
POTASSIUM SERPL-SCNC: 4 MEQ/L (ref 3.4–4.9)
PROTEIN UA: NEGATIVE MG/DL
RBC # BLD: 4.63 M/UL (ref 4.2–5.4)
SODIUM BLD-SCNC: 136 MEQ/L (ref 135–144)
SPECIFIC GRAVITY UA: 1.02 (ref 1–1.03)
TOTAL PROTEIN: 7.2 G/DL (ref 6.3–8)
URINE REFLEX TO CULTURE: ABNORMAL
UROBILINOGEN, URINE: 0.2 E.U./DL
WBC # BLD: 5.7 K/UL (ref 4.8–10.8)

## 2022-07-07 PROCEDURE — 99284 EMERGENCY DEPT VISIT MOD MDM: CPT

## 2022-07-07 PROCEDURE — 83690 ASSAY OF LIPASE: CPT

## 2022-07-07 PROCEDURE — 85025 COMPLETE CBC W/AUTO DIFF WBC: CPT

## 2022-07-07 PROCEDURE — 6360000002 HC RX W HCPCS: Performed by: PHYSICIAN ASSISTANT

## 2022-07-07 PROCEDURE — 81003 URINALYSIS AUTO W/O SCOPE: CPT

## 2022-07-07 PROCEDURE — 96372 THER/PROPH/DIAG INJ SC/IM: CPT

## 2022-07-07 PROCEDURE — 80053 COMPREHEN METABOLIC PANEL: CPT

## 2022-07-07 PROCEDURE — 36415 COLL VENOUS BLD VENIPUNCTURE: CPT

## 2022-07-07 PROCEDURE — 74176 CT ABD & PELVIS W/O CONTRAST: CPT

## 2022-07-07 RX ORDER — IBUPROFEN 800 MG/1
800 TABLET ORAL EVERY 8 HOURS PRN
Qty: 20 TABLET | Refills: 0 | Status: SHIPPED | OUTPATIENT
Start: 2022-07-07

## 2022-07-07 RX ORDER — KETOROLAC TROMETHAMINE 30 MG/ML
60 INJECTION, SOLUTION INTRAMUSCULAR; INTRAVENOUS ONCE
Status: COMPLETED | OUTPATIENT
Start: 2022-07-07 | End: 2022-07-07

## 2022-07-07 RX ORDER — KETOROLAC TROMETHAMINE 30 MG/ML
30 INJECTION, SOLUTION INTRAMUSCULAR; INTRAVENOUS ONCE
Status: DISCONTINUED | OUTPATIENT
Start: 2022-07-07 | End: 2022-07-07

## 2022-07-07 RX ADMIN — KETOROLAC TROMETHAMINE 60 MG: 30 INJECTION, SOLUTION INTRAMUSCULAR at 07:51

## 2022-07-07 ASSESSMENT — PAIN DESCRIPTION - LOCATION
LOCATION: ABDOMEN

## 2022-07-07 ASSESSMENT — ENCOUNTER SYMPTOMS
SHORTNESS OF BREATH: 0
ABDOMINAL PAIN: 1
EYE PAIN: 0
PHOTOPHOBIA: 0
SORE THROAT: 0
DIARRHEA: 1
COUGH: 0
BACK PAIN: 0
NAUSEA: 0
RHINORRHEA: 0
VOMITING: 0

## 2022-07-07 ASSESSMENT — PAIN DESCRIPTION - ONSET: ONSET: ON-GOING

## 2022-07-07 ASSESSMENT — PAIN DESCRIPTION - PAIN TYPE: TYPE: ACUTE PAIN;CHRONIC PAIN

## 2022-07-07 ASSESSMENT — PAIN DESCRIPTION - DESCRIPTORS: DESCRIPTORS: CRAMPING

## 2022-07-07 ASSESSMENT — PAIN SCALES - GENERAL
PAINLEVEL_OUTOF10: 6
PAINLEVEL_OUTOF10: 8

## 2022-07-07 ASSESSMENT — PAIN DESCRIPTION - FREQUENCY: FREQUENCY: INTERMITTENT

## 2022-07-07 ASSESSMENT — PAIN - FUNCTIONAL ASSESSMENT: PAIN_FUNCTIONAL_ASSESSMENT: 0-10

## 2022-07-07 ASSESSMENT — PAIN DESCRIPTION - ORIENTATION: ORIENTATION: LEFT;LOWER

## 2022-07-07 NOTE — ED TRIAGE NOTES
Pt presents to ED for c/o LLQ abd pain described as intermittent cramping with 3 episodes of diarrhea and vomiting since yesterday evening.

## 2022-07-07 NOTE — Clinical Note
Kaitlin Alireza was seen and treated in our emergency department on 7/7/2022. She may return to work on 07/08/2022. If you have any questions or concerns, please don't hesitate to call.       Rahat Weinstein

## 2022-07-07 NOTE — ED PROVIDER NOTES
[id number] ED  eMERGENCY dEPARTMENTeNCOUnter      Pt Name: Cecile Stallings  MRN: [id number]  Armsjerrygfadin 1980  Date ofevaluation: 7/7/2022  Provider: Sagar Arita PA-C    CHIEF COMPLAINT       Chief Complaint   Patient presents with    Abdominal Pain         HISTORY OF PRESENT ILLNESS   (Location/Symptom, Timing/Onset,Context/Setting, Quality, Duration, Modifying Factors, Severity)  Note limiting factors. Cecile Stallings is a 37 y.o. female who presents to the emergency department llq pain since yesterday. Described as crampy with associated brown diarrhea. Denies nausea or vomiting. No otc meds tried yet. Denies urinary symptoms or fevers. HPI    NursingNotes were reviewed. REVIEW OF SYSTEMS    (2-9 systems for level 4, 10 or more for level 5)     Review of Systems   Constitutional: Negative for chills, diaphoresis, fatigue and fever. HENT: Negative for congestion, rhinorrhea and sore throat. Eyes: Negative for photophobia and pain. Respiratory: Negative for cough and shortness of breath. Cardiovascular: Negative for chest pain and palpitations. Gastrointestinal: Positive for abdominal pain and diarrhea. Negative for nausea and vomiting. Genitourinary: Negative for dysuria and flank pain. Musculoskeletal: Negative for back pain. Skin: Negative for rash. Neurological: Negative for dizziness, light-headedness and headaches. Psychiatric/Behavioral: Negative. All other systems reviewed and are negative. Except as noted above the remainder of the review of systems was reviewed and negative.        PAST MEDICAL HISTORY     Past Medical History:   Diagnosis Date    Abnormal Pap smear of cervix     Breast disorder     Fibroid     Hypertension          SURGICALHISTORY       Past Surgical History:   Procedure Laterality Date    FOOT SURGERY      cyst on right foot as a child    HYSTERECTOMY, VAGINAL N/A 9/16/2021    LAPAROSCOPIC HYSTERECTOMY, BILATERAL SALPINGECTOMY, LAPAROTOMY ASSISTED MYOMECTOMY [place] [facility] [place]    Dr Brook Martinez [phone]         CURRENT MEDICATIONS       Discharge Medication List as of 2022  9:27 AM      CONTINUE these medications which have NOT CHANGED    Details   omeprazole 20 MG EC tablet Take 20 mg by mouth dailyHistorical Med      lisinopril (PRINIVIL;ZESTRIL) 20 MG tablet Take 1 tablet by mouth daily, Disp-30 tablet, R-0Print      naproxen (NAPROSYN) 500 MG tablet Take 1 tablet by mouth 2 times daily for 20 doses, Disp-20 tablet, R-0Print      furosemide (LASIX) 20 MG tablet Take 1 tablet by mouth 2 times daily, Disp-60 tablet, R-3Normal      gabapentin (NEURONTIN) 600 MG tablet Take 600 mg by mouth 2 times daily as needed. Historical Med             ALLERGIES     Metronidazole and Percocet [oxycodone-acetaminophen]    FAMILY HISTORY       Family History   Problem Relation Age of Onset    Diabetes Paternal Grandmother     Diabetes Father     Diabetes Paternal Aunt     Diabetes Paternal Uncle     Breast Cancer Neg Hx     Cancer Neg Hx     Colon Cancer Neg Hx     Eclampsia Neg Hx     Ovarian Cancer Neg Hx     Hypertension Neg Hx      Labor Neg Hx     Spont Abortions Neg Hx     Stroke Neg Hx           SOCIAL HISTORY       Social History     Socioeconomic History    Marital status: Single     Spouse name: None    Number of children: None    Years of education: None    Highest education level: None   Occupational History    None   Tobacco Use    Smoking status: Former Smoker     Quit date: 2012     Years since quittin.8    Smokeless tobacco: Never Used   Vaping Use    Vaping Use: Never used   Substance and Sexual Activity    Alcohol use: Yes     Comment:  On \"occasion liquor\"    Drug use: No    Sexual activity: Yes     Partners: Male     Comment: Condoms   Other Topics Concern    None   Social History Narrative    None Social Determinants of Health     Financial Resource Strain:     Difficulty of Paying Living Expenses: Not on file   Food Insecurity:     Worried About Running Out of Food in the Last Year: Not on file    Adali of Food in the Last Year: Not on file   Transportation Needs:     Lack of Transportation (Medical): Not on file    Lack of Transportation (Non-Medical): Not on file   Physical Activity: Unknown    Days of Exercise per Week: 2 days    Minutes of Exercise per Session: Not on file   Stress:     Feeling of Stress : Not on file   Social Connections:     Frequency of Communication with Friends and Family: Not on file    Frequency of Social Gatherings with Friends and Family: Not on file    Attends Judaism Services: Not on file    Active Member of [address] [place] [address] [employer] or Organizations: Not on file    Attends Club or Organization Meetings: Not on file    Marital Status: Not on file   Intimate Partner Violence: Not At Risk    Fear of Current or Ex-Partner: No    Emotionally Abused: No    Physically Abused: No    Sexually Abused: No   Housing Stability:     Unable to Pay for Housing in the Last Year: Not on file    Number of [place] in the Last Year: Not on file    Unstable Housing in the Last Year: Not on file       SCREENINGS    Harshal Coma Scale  Eye Opening: Spontaneous  Best Verbal Response: Oriented  Best Motor Response: Obeys commands  [place] Coma Scale Score: 15 @FLOW([id number])@      PHYSICAL EXAM    (up to 7 for level 4, 8 or more for level 5)     ED Triage Vitals [07/07/22 0623]   BP Temp Temp Source Heart Rate Resp SpO2 Height Weight   (!) 161/113 98.2 °F (36.8 °C) Oral 90 18 98 % 5' 1\" (1.549 m) 275 lb (124.7 kg)       Physical Exam  Vitals and nursing note reviewed. Constitutional:       General: She is not in acute distress. Appearance: Normal appearance. She is well-developed. She is not diaphoretic. HENT:      Head: Normocephalic and atraumatic.    Eyes:      General: Lids are normal.      Conjunctiva/sclera: Conjunctivae normal.   Cardiovascular:      Rate and Rhythm: Normal rate and regular rhythm. Pulses: Normal pulses. Heart sounds: Normal heart sounds. Pulmonary:      Effort: Pulmonary effort is normal.      Breath sounds: Normal breath sounds. Abdominal:      General: Bowel sounds are normal.      Palpations: Abdomen is soft. Tenderness: There is abdominal tenderness in the left lower quadrant. Musculoskeletal:      Cervical back: Normal range of motion and neck supple. Lymphadenopathy:      Cervical: No cervical adenopathy. Skin:     General: Skin is warm and dry. Capillary Refill: Capillary refill takes less than 2 seconds. Findings: No rash. Neurological:      Mental Status: She is alert and oriented to person, place, and time. Psychiatric:         Thought Content: Thought content normal.         Judgment: Judgment normal.         DIAGNOSTIC RESULTS     EKG: All EKG's are interpreted by the Emergency Department Physician who either signs or Co-signsthis chart in the absence of a cardiologist.        RADIOLOGY:   [place] such as CT, Ultrasound and MRI are read by the radiologist. Plain radiographic images are visualized and preliminarily interpreted by the emergency physician with the below findings:        Interpretation per the Radiologist below, if available at the time ofthis note:    CT ABDOMEN PELVIS WO CONTRAST Additional Contrast? None   Final Result   NO ACUTE PATHOLOGY IN THE ABDOMEN OR PELVIS.                ED BEDSIDE ULTRASOUND:   Performed by ED Physician - none    LABS:  Labs Reviewed   COMPREHENSIVE METABOLIC PANEL - Abnormal; Notable for the following components:       Result Value    Glucose 166 (*)     All other components within normal limits   URINALYSIS WITH REFLEX TO CULTURE - Abnormal; Notable for the following components:    Glucose, Ur >=1000 (*)     All other components within normal limits   POCT CREATININE - URINE - Normal   CBC WITH AUTO DIFFERENTIAL   LIPASE       All other labs were within normal range or not returned as of this dictation. EMERGENCY DEPARTMENT COURSE and DIFFERENTIAL DIAGNOSIS/MDM:   Vitals:    Vitals:    07/07/22 0623 07/07/22 0845 07/07/22 0900   BP: (!) 161/113 (!) 143/97 (!) 140/82   Pulse: 90 72 70   Resp: 18 18 18   Temp: 98.2 °F (36.8 °C)     TempSrc: Oral     SpO2: 98% 96% 97%   Weight: 275 lb (124.7 kg)     Height: 5' 1\" (1.549 m)             MDM    Resents with left lower quadrant pain. Mild tenderness to palpation nonacute abdomen on multiple examinations. Provided with Toradol. CT abdomen pelvis shows no acute process her labs are all within normal limits. She is supposed to have a ultrasound done ordered by her OB/GYN but has not gotten around to it. She does have a history of a hysterectomy but she still has her ovaries. There was concern at one point that she had ovarian cyst which is why the ultrasounds were ordered but she never had it done. She also has been having 3 episodes of diarrhea this could just be a diarrheal a real illness with abdominal cramping. At this time will treat with supportive measures given a work note instructed to follow-up with her family doctor in a few days and to return to the ED if she starts experiencing fevers nausea vomiting worsening abdominal pain. Patient stable for discharge. REASSESSMENT          CRITICAL CARE TIME   Total Critical Care time was  minutes, excluding separatelyreportable procedures. There was a high probability ofclinically significant/life threatening deterioration in the patient's condition which required my urgent intervention. CONSULTS:  None    PROCEDURES:  Unless otherwise noted below, none     Procedures    FINAL IMPRESSION      1.  Abdominal pain, left lower quadrant          DISPOSITION/PLAN   DISPOSITION Decision To Discharge 07/07/2022 09:25:14 AM      PATIENT REFERREDTO:  Kandice Shaw Vashti Pulido  [phone]    Schedule an appointment as soon as possible for a visit in 3 days      Joanna Wagner MD  [address] [place] [address]  [phone]            DISCHARGEMEDICATIONS:  Discharge Medication List as of 7/7/2022  9:27 AM             (Please note that portions of this note were completed with a voice recognition program.  Efforts were made to edit the dictations but occasionally words are mis-transcribed.)    Reginaldo Christensen (electronically signed)  Attending Emergency Physician         Fabio Mckeon PA-C  07/07/22 1668

## 2022-07-11 ENCOUNTER — TELEPHONE (OUTPATIENT)
Dept: FAMILY MEDICINE CLINIC | Age: 42
End: 2022-07-11

## 2022-07-11 NOTE — TELEPHONE ENCOUNTER
*1st attempt 7/11/22 to reschedule the appointment on 7/27/22 due to the provider being out of the office - no answer - sierram

## 2022-07-24 NOTE — PROGRESS NOTES
Patient seen in ED, has no OBGYN provider on file. Call and check if needs follow up for Abdominal Pain, Left Lower Quadrant (Primary)   and to schedule with our office.

## 2022-08-08 ENCOUNTER — TELEPHONE (OUTPATIENT)
Dept: OBGYN CLINIC | Age: 42
End: 2022-08-08

## 2022-10-05 ENCOUNTER — TELEPHONE (OUTPATIENT)
Dept: OBGYN CLINIC | Age: 42
End: 2022-10-05

## 2022-10-05 NOTE — TELEPHONE ENCOUNTER
Pt states at last appt weight loss surgery was discussed and she wants to know if she can have a referral to the bariatric doctor through [facility].

## 2022-11-28 ENCOUNTER — TELEPHONE (OUTPATIENT)
Dept: FAMILY MEDICINE CLINIC | Age: 42
End: 2022-11-28

## 2022-11-28 NOTE — TELEPHONE ENCOUNTER
----- Message from Juan Vinson sent at 11/17/2022  1:46 PM EST -----  Subject: Appointment Request    Reason for Call: New Patient/New to Provider Appointment needed: New   Patient Request Appointment    QUESTIONS    Reason for appointment request? No appointments available during search     Additional Information for Provider? patient cancelled appt for 11/17 but   would like another new patient appt with Nathalie Monday but nothing   showed-screened green  ---------------------------------------------------------------------------  --------------  Janette Chand INFO  [phone]; OK to leave message on voicemail  ---------------------------------------------------------------------------  --------------  SCRIPT ANSWERS  COVID Screen: Angella Chowdhury

## 2022-12-07 ENCOUNTER — OFFICE VISIT (OUTPATIENT)
Dept: OBGYN CLINIC | Age: 42
End: 2022-12-07

## 2022-12-07 VITALS
BODY MASS INDEX: 49.84 KG/M2 | HEIGHT: 61 IN | HEART RATE: 68 BPM | WEIGHT: 264 LBS | SYSTOLIC BLOOD PRESSURE: 124 MMHG | DIASTOLIC BLOOD PRESSURE: 86 MMHG

## 2022-12-07 DIAGNOSIS — R23.2 HOT FLASHES: ICD-10-CM

## 2022-12-07 DIAGNOSIS — Z11.51 SCREENING FOR HPV (HUMAN PAPILLOMAVIRUS): ICD-10-CM

## 2022-12-07 DIAGNOSIS — E66.01 SEVERE OBESITY (BMI >= 40) (HCC): ICD-10-CM

## 2022-12-07 DIAGNOSIS — Z01.419 VISIT FOR GYNECOLOGIC EXAMINATION: ICD-10-CM

## 2022-12-07 DIAGNOSIS — R23.2 HOT FLASHES: Primary | ICD-10-CM

## 2022-12-07 LAB
T4 FREE: 1.24 NG/DL (ref 0.84–1.68)
TSH REFLEX: 2.12 UIU/ML (ref 0.44–3.86)

## 2022-12-07 RX ORDER — ESTRADIOL 0.1 MG/G
1 CREAM VAGINAL DAILY
Qty: 1 EACH | Refills: 6 | Status: SHIPPED | OUTPATIENT
Start: 2022-12-07

## 2022-12-07 NOTE — PROGRESS NOTES
Subjective:      Rhiannon Conrad is a 40 y. o.female  here for routine exam. Current Complaints: she complains of none - posthysterectomy. C/o hot flashes. Menstrual history:   absent, posthysterectomy. Sexual activity:  yes, denies knowledge of risky exposure  Abnormal vaginaldischarge:  No  Contraceptive method:  status post hysterectomy    Vitals:  /86 (Site: Right Upper Arm, Position: Sitting, Cuff Size: Large Adult)   Pulse 68   Ht 5' 1\" (1.549 m)   Wt 264 lb (119.7 kg)   LMP  (LMP Unknown)   BMI 49.88 kg/m²   Allergies:  Metronidazole and Percocet [oxycodone-acetaminophen]     Past Medical History:   Diagnosis Date    Abnormal Pap smear of cervix     Breast disorder     Fibroid     Hypertension      Past Surgical History:   Procedure Laterality Date    FOOT SURGERY      cyst on right foot as a child    HYSTERECTOMY, VAGINAL N/A 2021    LAPAROSCOPIC HYSTERECTOMY, BILATERAL SALPINGECTOMY, LAPAROTOMY ASSISTED MYOMECTOMY performed by Dave Angelucci, MD at [address]  2020    Dr Tu Fuentes [phone]     Family History   Problem Relation Age of Onset    Diabetes Paternal Grandmother     Diabetes Father     Diabetes Paternal Aunt     Diabetes Paternal Uncle     Breast Cancer Neg Hx     Cancer Neg Hx     Colon Cancer Neg Hx     Eclampsia Neg Hx     Ovarian Cancer Neg Hx     Hypertension Neg Hx      Labor Neg Hx     Spont Abortions Neg Hx     Stroke Neg Hx      Social History     Socioeconomic History    Marital status: Single     Spouse name: Not on file    Number of children: Not on file    Years of education: Not on file    Highest education level: Not on file   Occupational History    Not on file   Tobacco Use    Smoking status: Former     Types: Cigarettes     Quit date: 2012     Years since quitting: 10.3    Smokeless tobacco: Never   Vaping Use    Vaping Use: Never used   Substance and Sexual Activity    Alcohol use:  Yes Comment: On \"occasion liquor\"    Drug use: No    Sexual activity: Yes     Partners: Male     Comment: Condoms   Other Topics Concern    Not on file   Social History Narrative    Not on file     Social Determinants of Health     Financial Resource Strain: Not on file   Food Insecurity: Not on file   Transportation Needs: Not on file   Physical Activity: Unknown    Days of Exercise per Week: 2 days    Minutes of Exercise per Session: Not on file   Stress: Not on file   Social Connections: Not on file   Intimate Partner Violence: Not At Risk    Fear of Current or Ex-Partner: No    Emotionally Abused: No    Physically Abused: No    Sexually Abused: No   Housing Stability: Not on file       Gynecologic History  No LMP recorded (lmp unknown). Patient has had a hysterectomy. Last Pap: 1 yr  Results: normal  Last Mammogram: Yes Results: normal  FH breast cancer : neg   OB History          0    Para   0    Term   0       0    AB   0    Living   0         SAB   0    IAB   0    Ectopic   0    Molar   0    Multiple   0    Live Births   0              Patient's medications, allergies, past medical, surgical, social and family histories were reviewed and updated as appropriate. Review of Systems  Review of Systems  Review of Systems   Constitutional: Negative for chills and fever. Respiratory: Negative for cough and shortnessof breath. Cardiovascular: Negative for chest pain and palpitations. Gastrointestinal: Negative for nausea and vomiting. Genitourinary: Negative for dysuria,frequency and urgency. Musculoskeletal: Negative for myalgias. Neurological: Negative for dizziness, seizures and headaches. Psychiatric/Behavioral: Negative for depression and suicidal ideas. All other systemsreviewed and are negative.     Objective:     Vitals:  /86 (Site: Right Upper Arm, Position: Sitting, Cuff Size: Large Adult)   Pulse 68   Ht 5' 1\" (1.549 m)   Wt 264 lb (119.7 kg)   LMP  (LMP Unknown) BMI 49.88 kg/m²     Physical Exam  Physical Exam  Physical Exam   Constitutional: She is oriented to person,place, and time and well-developed, well-nourished, and in no distress. HENT:   Head: Normocephalic and atraumatic. Eyes: Conjunctivae are normal. Pupils are equal, round, andreactive to light. Neck: Normal range of motion. Neck supple. Cardiovascular: Normal rate and regular rhythm. Pulmonary/Chest: Effort normal and breath soundsnormal. No respiratory distress. Right breast exhibits no inverted nipple, no mass, no nipple discharge, no skin change and no tenderness. Left breast exhibits no inverted nipple, no mass, no nipple discharge, no skin changeand no tenderness. Breasts are symmetrical.   Abdominal: Soft. Bowel sounds are normal.   Genitourinary: Vagina normal, uterus normal and cervix normal. No vaginal discharge found. Musculoskeletal: Normal range of motion. Neurological: She is alert and oriented to person, place, and time. She has normal reflexes. Psychiatric: Memory, affect and judgment normal.       Assessment:      Diagnosis Orders   1. Hot flashes  Follicle Stimulating Hormone    TSH with Reflex    T4, Free      2. Severe obesity (BMI >= 40) ([facility] [address]  Eloise Silva MD, Bariatric Surgery, [place]      3. Visit for gynecologic examination  PAP SMEAR      4. Screening for HPV (human papillomavirus)  PAP SMEAR          Body mass index is 49.88 kg/m². Obesity:  Class III  Smoking:  No    Plan:   Pap smear : indicated:  performed. Breast exam : Normal  STD work up : Not indicated  Referral to bariatric surgery at [facility] ( see below ) .      Obesity Counseling:  Given  Smoking Counseling:  N/A  STD counseling: N/A     Orders Placed This Encounter   Procedures    Follicle Stimulating Hormone     Standing Status:   Future     Number of Occurrences:   1     Standing Expiration Date:   12/7/2023    TSH with Reflex     Standing Status:   Future     Number of Occurrences:   1 Standing Expiration Date:   12/7/2023    T4, Free     Standing Status:   Future     Number of Occurrences:   1     Standing Expiration Date:   12/7/2023    PAP SMEAR     Standing Status:   Future     Standing Expiration Date:   12/7/2023     Order Specific Question:   Collection Type     Answer: Thin Prep     Order Specific Question:   Prior Abnormal Pap Test     Answer:   No     Order Specific Question:   Screening or Diagnostic     Answer:   Screening     Order Specific Question:   HPV Requested? Answer:   Yes     Order Specific Question:   High Risk Patient     Answer:   Ellis Hylton MD, Bariatric Surgery, [place]     Referral Priority:   Routine     Referral Type:   Eval and Treat     Referral Reason:   Specialty Services Required     Referred to Provider:   Jessy De La Torre MD     Requested Specialty:   Bariatric Surgery     Number of Visits Requested:   1       Orders Placed This Encounter   Medications    estradiol (ESTRACE VAGINAL) 0.1 MG/GM vaginal cream     Sig: Place 1 g vaginally daily     Dispense:  1 each     Refill:  6       Follow up:  No follow-ups on file.       Maricruz Linares MD

## 2022-12-08 LAB — FOLLICLE STIMULATING HORMONE: 2 MIU/ML (ref 1.7–21.5)

## 2023-04-05 ENCOUNTER — OFFICE VISIT (OUTPATIENT)
Dept: OBGYN CLINIC | Age: 43
End: 2023-04-05
Payer: COMMERCIAL

## 2023-04-05 VITALS
BODY MASS INDEX: 49.47 KG/M2 | SYSTOLIC BLOOD PRESSURE: 118 MMHG | HEIGHT: 61 IN | DIASTOLIC BLOOD PRESSURE: 84 MMHG | HEART RATE: 72 BPM | WEIGHT: 262 LBS

## 2023-04-05 DIAGNOSIS — R35.0 URINARY FREQUENCY: Primary | ICD-10-CM

## 2023-04-05 DIAGNOSIS — R35.0 URINARY FREQUENCY: ICD-10-CM

## 2023-04-05 DIAGNOSIS — E66.01 SEVERE OBESITY (BMI >= 40) (HCC): ICD-10-CM

## 2023-04-05 LAB
BILIRUB UR QL STRIP: NEGATIVE
CLARITY UR: ABNORMAL
COLOR UR: YELLOW
GLUCOSE UR STRIP-MCNC: >=1000 MG/DL
HGB UR QL STRIP: NEGATIVE
KETONES UR STRIP-MCNC: ABNORMAL MG/DL
LEUKOCYTE ESTERASE UR QL STRIP: NEGATIVE
NITRITE UR QL STRIP: NEGATIVE
PH UR STRIP: 6 [PH] (ref 5–9)
PROT UR STRIP-MCNC: NEGATIVE MG/DL
SP GR UR STRIP: 1.03 (ref 1–1.03)
UROBILINOGEN UR STRIP-ACNC: 0.2 E.U./DL

## 2023-04-05 PROCEDURE — 99213 OFFICE O/P EST LOW 20 MIN: CPT | Performed by: OBSTETRICS & GYNECOLOGY

## 2023-04-05 SDOH — ECONOMIC STABILITY: HOUSING INSECURITY
IN THE LAST 12 MONTHS, WAS THERE A TIME WHEN YOU DID NOT HAVE A STEADY PLACE TO SLEEP OR SLEPT IN A SHELTER (INCLUDING NOW)?: NO

## 2023-04-05 SDOH — ECONOMIC STABILITY: FOOD INSECURITY: WITHIN THE PAST 12 MONTHS, YOU WORRIED THAT YOUR FOOD WOULD RUN OUT BEFORE YOU GOT MONEY TO BUY MORE.: SOMETIMES TRUE

## 2023-04-05 SDOH — ECONOMIC STABILITY: TRANSPORTATION INSECURITY
IN THE PAST 12 MONTHS, HAS LACK OF TRANSPORTATION KEPT YOU FROM MEETINGS, WORK, OR FROM GETTING THINGS NEEDED FOR DAILY LIVING?: NO

## 2023-04-05 SDOH — ECONOMIC STABILITY: INCOME INSECURITY: HOW HARD IS IT FOR YOU TO PAY FOR THE VERY BASICS LIKE FOOD, HOUSING, MEDICAL CARE, AND HEATING?: HARD

## 2023-04-05 SDOH — ECONOMIC STABILITY: FOOD INSECURITY: WITHIN THE PAST 12 MONTHS, THE FOOD YOU BOUGHT JUST DIDN'T LAST AND YOU DIDN'T HAVE MONEY TO GET MORE.: SOMETIMES TRUE

## 2023-04-05 ASSESSMENT — PATIENT HEALTH QUESTIONNAIRE - PHQ9
SUM OF ALL RESPONSES TO PHQ QUESTIONS 1-9: 0
SUM OF ALL RESPONSES TO PHQ9 QUESTIONS 1 & 2: 0
1. LITTLE INTEREST OR PLEASURE IN DOING THINGS: 0
SUM OF ALL RESPONSES TO PHQ QUESTIONS 1-9: 0
2. FEELING DOWN, DEPRESSED OR HOPELESS: 0

## 2023-04-06 LAB — BACTERIA UR CULT: NORMAL

## 2023-04-07 ENCOUNTER — TELEPHONE (OUTPATIENT)
Dept: OBGYN CLINIC | Age: 43
End: 2023-04-07

## 2023-09-22 ENCOUNTER — HOSPITAL ENCOUNTER (EMERGENCY)
Age: 43
Discharge: HOME | End: 2023-09-23
Payer: MEDICAID

## 2023-09-22 DIAGNOSIS — R07.89 CHEST WALL PAIN: Primary | ICD-10-CM

## 2023-09-22 LAB
EKG ATRIAL RATE: 75 BPM
EKG P AXIS: -19 DEGREES
EKG P-R INTERVAL: 140 MS
EKG Q-T INTERVAL: 400 MS
EKG QRS DURATION: 68 MS
EKG QTC CALCULATION (BAZETT): 446 MS
EKG R AXIS: 4 DEGREES
EKG T AXIS: 4 DEGREES
EKG VENTRICULAR RATE: 75 BPM

## 2023-09-22 PROCEDURE — 93005 ELECTROCARDIOGRAM TRACING: CPT | Performed by: PHYSICIAN ASSISTANT

## 2023-09-22 PROCEDURE — 99285 EMERGENCY DEPT VISIT HI MDM: CPT

## 2023-09-22 PROCEDURE — 96374 THER/PROPH/DIAG INJ IV PUSH: CPT

## 2023-09-22 ASSESSMENT — PAIN DESCRIPTION - ORIENTATION: ORIENTATION: MID

## 2023-09-22 ASSESSMENT — PAIN DESCRIPTION - LOCATION: LOCATION: CHEST

## 2023-09-22 ASSESSMENT — PAIN DESCRIPTION - FREQUENCY: FREQUENCY: CONTINUOUS

## 2023-09-22 ASSESSMENT — PAIN SCALES - GENERAL: PAINLEVEL_OUTOF10: 10

## 2023-09-22 ASSESSMENT — PAIN DESCRIPTION - ONSET: ONSET: ON-GOING

## 2023-09-22 ASSESSMENT — PAIN DESCRIPTION - DESCRIPTORS: DESCRIPTORS: CRAMPING;BURNING

## 2023-09-23 ENCOUNTER — APPOINTMENT (OUTPATIENT)
Dept: GENERAL RADIOLOGY | Age: 43
End: 2023-09-23
Payer: MEDICAID

## 2023-09-23 VITALS
TEMPERATURE: 98.7 F | OXYGEN SATURATION: 95 % | BODY MASS INDEX: 52.87 KG/M2 | HEART RATE: 67 BPM | RESPIRATION RATE: 18 BRPM | HEIGHT: 61 IN | SYSTOLIC BLOOD PRESSURE: 148 MMHG | WEIGHT: 280 LBS | DIASTOLIC BLOOD PRESSURE: 78 MMHG

## 2023-09-23 LAB
ALBUMIN SERPL-MCNC: 4 G/DL (ref 3.5–4.6)
ALP SERPL-CCNC: 64 U/L (ref 40–130)
ALT SERPL-CCNC: 52 U/L (ref 0–33)
ANION GAP SERPL CALCULATED.3IONS-SCNC: 10 MEQ/L (ref 9–15)
AST SERPL-CCNC: 61 U/L (ref 0–35)
BASOPHILS # BLD: 0 K/UL (ref 0–0.2)
BASOPHILS NFR BLD: 0.4 %
BILIRUB SERPL-MCNC: <0.2 MG/DL (ref 0.2–0.7)
BILIRUB UR QL STRIP: NEGATIVE
BUN SERPL-MCNC: 11 MG/DL (ref 6–20)
CALCIUM SERPL-MCNC: 9 MG/DL (ref 8.5–9.9)
CHLORIDE SERPL-SCNC: 104 MEQ/L (ref 95–107)
CLARITY UR: CLEAR
CO2 SERPL-SCNC: 25 MEQ/L (ref 20–31)
COLOR UR: YELLOW
CREAT SERPL-MCNC: 0.74 MG/DL (ref 0.5–0.9)
EOSINOPHIL # BLD: 0.1 K/UL (ref 0–0.7)
EOSINOPHIL NFR BLD: 1.6 %
ERYTHROCYTE [DISTWIDTH] IN BLOOD BY AUTOMATED COUNT: 13.4 % (ref 11.5–14.5)
GLOBULIN SER CALC-MCNC: 3.2 G/DL (ref 2.3–3.5)
GLUCOSE SERPL-MCNC: 139 MG/DL (ref 70–99)
GLUCOSE UR STRIP-MCNC: 500 MG/DL
HCT VFR BLD AUTO: 43.3 % (ref 37–47)
HGB BLD-MCNC: 14 G/DL (ref 12–16)
HGB UR QL STRIP: NEGATIVE
KETONES UR STRIP-MCNC: NEGATIVE MG/DL
LEUKOCYTE ESTERASE UR QL STRIP: NEGATIVE
LIPASE SERPL-CCNC: 32 U/L (ref 12–95)
LYMPHOCYTES # BLD: 2.2 K/UL (ref 1–4.8)
LYMPHOCYTES NFR BLD: 45.1 %
MAGNESIUM SERPL-MCNC: 2.1 MG/DL (ref 1.7–2.4)
MCH RBC QN AUTO: 28.3 PG (ref 27–31.3)
MCHC RBC AUTO-ENTMCNC: 32.3 % (ref 33–37)
MCV RBC AUTO: 87.5 FL (ref 79.4–94.8)
MONOCYTES # BLD: 0.3 K/UL (ref 0.2–0.8)
MONOCYTES NFR BLD: 7 %
NEUTROPHILS # BLD: 2.2 K/UL (ref 1.4–6.5)
NEUTS SEG NFR BLD: 45.7 %
NITRITE UR QL STRIP: NEGATIVE
PH UR STRIP: 5.5 [PH] (ref 5–9)
PLATELET # BLD AUTO: 249 K/UL (ref 130–400)
POTASSIUM SERPL-SCNC: 4.2 MEQ/L (ref 3.4–4.9)
PROT SERPL-MCNC: 7.2 G/DL (ref 6.3–8)
PROT UR STRIP-MCNC: NEGATIVE MG/DL
RBC # BLD AUTO: 4.95 M/UL (ref 4.2–5.4)
SODIUM SERPL-SCNC: 139 MEQ/L (ref 135–144)
SP GR UR STRIP: 1.02 (ref 1–1.03)
TROPONIN T SERPL-MCNC: <0.01 NG/ML (ref 0–0.01)
URINE REFLEX TO CULTURE: ABNORMAL
UROBILINOGEN UR STRIP-ACNC: 1 E.U./DL
WBC # BLD AUTO: 4.9 K/UL (ref 4.8–10.8)

## 2023-09-23 PROCEDURE — 6360000002 HC RX W HCPCS: Performed by: PHYSICIAN ASSISTANT

## 2023-09-23 PROCEDURE — 83735 ASSAY OF MAGNESIUM: CPT

## 2023-09-23 PROCEDURE — 71046 X-RAY EXAM CHEST 2 VIEWS: CPT

## 2023-09-23 PROCEDURE — 96374 THER/PROPH/DIAG INJ IV PUSH: CPT

## 2023-09-23 PROCEDURE — 81003 URINALYSIS AUTO W/O SCOPE: CPT

## 2023-09-23 PROCEDURE — 80053 COMPREHEN METABOLIC PANEL: CPT

## 2023-09-23 PROCEDURE — 36415 COLL VENOUS BLD VENIPUNCTURE: CPT

## 2023-09-23 PROCEDURE — 84484 ASSAY OF TROPONIN QUANT: CPT

## 2023-09-23 PROCEDURE — 85025 COMPLETE CBC W/AUTO DIFF WBC: CPT

## 2023-09-23 PROCEDURE — 83690 ASSAY OF LIPASE: CPT

## 2023-09-23 RX ORDER — KETOROLAC TROMETHAMINE 30 MG/ML
30 INJECTION, SOLUTION INTRAMUSCULAR; INTRAVENOUS ONCE
Status: COMPLETED | OUTPATIENT
Start: 2023-09-23 | End: 2023-09-23

## 2023-09-23 RX ADMIN — KETOROLAC TROMETHAMINE 30 MG: 30 INJECTION, SOLUTION INTRAMUSCULAR; INTRAVENOUS at 00:33

## 2023-09-23 ASSESSMENT — PAIN SCALES - GENERAL
PAINLEVEL_OUTOF10: 10
PAINLEVEL_OUTOF10: 10

## 2023-09-23 ASSESSMENT — ENCOUNTER SYMPTOMS
SHORTNESS OF BREATH: 0
ANAL BLEEDING: 0
ABDOMINAL PAIN: 1
NAUSEA: 0
EYE DISCHARGE: 0
VOICE CHANGE: 0
APNEA: 0
ABDOMINAL DISTENTION: 0
COUGH: 0
VOMITING: 0

## 2023-09-23 ASSESSMENT — HEART SCORE: ECG: 0

## 2023-09-23 ASSESSMENT — PAIN DESCRIPTION - LOCATION: LOCATION: CHEST

## 2023-09-23 NOTE — ED NOTES
Pt again reminded that urine spec is needed for dispo. Pt states she doesn't have to urinate. Pt quickly falls back to sleep.       Lluvia aCruso RN  09/23/23 [id number]

## 2023-09-23 NOTE — ED NOTES
Pt has multiple complaints. States she was having chest pain and some dizziness while at work. Pt also states she recently found out she has a hernia and it is bothering her. Also, pt states when she wipes after having a bowel movement she has been having sandor horse type cramping in her right hip. Pt denies any blood in her stool. EKG completed. 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       Sloan Jackson RN  09/23/23 [id number]

## 2023-09-23 NOTE — DISCHARGE INSTRUCTIONS
Follow-up with primary care physician and cardiology return to if any symptoms worsen or new symptoms develop.

## 2023-09-23 NOTE — ED NOTES
PT IS KIND OF UPSET BECAUSE OF HOW HER APPOINTMENT TODAY.   SHE DOES NOT WISH TO SEE DR ARAUJO, BUT WOULD LIKE TO STAY WITH DR AVENDANO.  SHE WOULD LIKE FOR DR AVENDANO TO CALL AND DISCUSS HER BX RESULTS.  [phone].  ALSO I PUT IN AN ORDER FOR A UA AND C&S FOR DYSURIA.   Pt c/o chest pain and right flank pain starting today at work.      Salena Mahmood  09/22/23 5131

## 2023-09-25 PROCEDURE — 93010 ELECTROCARDIOGRAM REPORT: CPT | Performed by: INTERNAL MEDICINE

## 2023-10-27 ENCOUNTER — OFFICE VISIT (OUTPATIENT)
Dept: OBGYN CLINIC | Age: 43
End: 2023-10-27
Payer: MEDICAID

## 2023-10-27 VITALS
BODY MASS INDEX: 44.75 KG/M2 | HEIGHT: 61 IN | HEART RATE: 72 BPM | DIASTOLIC BLOOD PRESSURE: 82 MMHG | SYSTOLIC BLOOD PRESSURE: 124 MMHG | WEIGHT: 237 LBS

## 2023-10-27 DIAGNOSIS — N89.8 VAGINAL DRYNESS: ICD-10-CM

## 2023-10-27 DIAGNOSIS — R35.0 URINARY FREQUENCY: ICD-10-CM

## 2023-10-27 DIAGNOSIS — Z12.31 VISIT FOR SCREENING MAMMOGRAM: ICD-10-CM

## 2023-10-27 DIAGNOSIS — R35.0 URINARY FREQUENCY: Primary | ICD-10-CM

## 2023-10-27 DIAGNOSIS — Z75.8 DOES NOT HAVE PRIMARY CARE PROVIDER: ICD-10-CM

## 2023-10-27 LAB
BILIRUB UR QL STRIP: NEGATIVE
CLARITY UR: CLEAR
COLOR UR: ABNORMAL
GLUCOSE UR STRIP-MCNC: 100 MG/DL
HGB UR QL STRIP: NEGATIVE
KETONES UR STRIP-MCNC: 15 MG/DL
LEUKOCYTE ESTERASE UR QL STRIP: NEGATIVE
NITRITE UR QL STRIP: NEGATIVE
PH UR STRIP: 5.5 [PH] (ref 5–9)
PROT UR STRIP-MCNC: ABNORMAL MG/DL
SP GR UR STRIP: 1.03 (ref 1–1.03)
UROBILINOGEN UR STRIP-ACNC: 0.2 E.U./DL

## 2023-10-27 PROCEDURE — 99214 OFFICE O/P EST MOD 30 MIN: CPT | Performed by: OBSTETRICS & GYNECOLOGY

## 2023-10-27 RX ORDER — OXYBUTYNIN CHLORIDE 10 MG/1
10 TABLET, EXTENDED RELEASE ORAL DAILY
Qty: 15 TABLET | Refills: 0 | Status: SHIPPED | OUTPATIENT
Start: 2023-10-27

## 2023-10-28 LAB — BACTERIA UR CULT: NORMAL

## 2024-01-07 ENCOUNTER — HOSPITAL ENCOUNTER (EMERGENCY)
Age: 44
Discharge: HOME | End: 2024-01-07
Attending: STUDENT IN AN ORGANIZED HEALTH CARE EDUCATION/TRAINING PROGRAM

## 2024-01-07 VITALS
SYSTOLIC BLOOD PRESSURE: 175 MMHG | OXYGEN SATURATION: 95 % | HEIGHT: 59 IN | HEART RATE: 73 BPM | RESPIRATION RATE: 16 BRPM | TEMPERATURE: 98.5 F | WEIGHT: 240 LBS | DIASTOLIC BLOOD PRESSURE: 95 MMHG | BODY MASS INDEX: 48.38 KG/M2

## 2024-01-07 DIAGNOSIS — J06.9 VIRAL URI WITH COUGH: Primary | ICD-10-CM

## 2024-01-07 LAB
INFLUENZA A BY PCR: NEGATIVE
INFLUENZA B BY PCR: NEGATIVE
SARS-COV-2 RDRP RESP QL NAA+PROBE: NOT DETECTED

## 2024-01-07 PROCEDURE — 87635 SARS-COV-2 COVID-19 AMP PRB: CPT

## 2024-01-07 PROCEDURE — 99283 EMERGENCY DEPT VISIT LOW MDM: CPT

## 2024-01-07 PROCEDURE — 87502 INFLUENZA DNA AMP PROBE: CPT

## 2024-01-07 ASSESSMENT — PAIN SCALES - GENERAL
PAINLEVEL_OUTOF10: 8
PAINLEVEL_OUTOF10: 8
PAINLEVEL_OUTOF10: 9

## 2024-01-07 ASSESSMENT — PAIN DESCRIPTION - LOCATION: LOCATION: THROAT

## 2024-01-07 ASSESSMENT — PAIN - FUNCTIONAL ASSESSMENT
PAIN_FUNCTIONAL_ASSESSMENT: 0-10
PAIN_FUNCTIONAL_ASSESSMENT: 0-10

## 2024-01-07 NOTE — ED PROVIDER NOTES
[facility] ED  Emergency Department Encounter  Emergency Medicine      Pt Name: Ramila Orosco  MRN:[id number]  Birthdate 1980  Date of evaluation: 1/7/24  PCP:  Preet Witt PA-C    CHIEF COMPLAINT       Chief Complaint   Patient presents with    Fatigue    Cough    Diarrhea    Illness       HISTORY OF PRESENT ILLNESS  (Location/Symptom, Timing/Onset, Context/Setting, Quality, Duration, ModifyingFactors, Severity.)      Ramila Orosco is a 43 y.o. female history of hypertension presents with a nonproductive cough tiredness congestion and nonbloody diarrhea that started a couple days ago.  She said there have been a lot of people that are sick at work.  She works at a nursing home.  She says she wanted to come here and get checked out to see which she might have given her line of work.  She has not tried any medications for it.  She said she has a little bit of sore throat along with this.  No headache no fevers or chills no neck pain or stiffness no nausea or vomiting, no abdominal pain no chest pain or shortness of breath.  No dysuria hematuria    PAST MEDICAL / SURGICAL / SOCIAL /FAMILY HISTORY      has a past medical history of Abnormal Pap smear of cervix, Breast disorder, Fibroid, and Hypertension.  No other pertinent PMH on review with patient/guardian.     has a past surgical history that includes Foot surgery; Uterine fibroid embolization (03/11/2020); Hysterectomy, vaginal (N/A, 09/16/2021); Hysterectomy (Sept 2021); and myomectomy (2021).  No other pertinent PSH on review with patient/guardian.  Social History     Socioeconomic History    Marital status: Single     Spouse name: Not on file    Number of children: Not on file    Years of education: Not on file    Highest education level: Not on file   Occupational History    Not on file   Tobacco Use    Smoking status: Former     Current packs/day: 0.00     Types: Cigarettes     Quit date: 8/19/2012     Years since quitting:

## 2024-01-07 NOTE — ED TRIAGE NOTES
Pt here for body aches, cough, sore throat and diarrhea beginning Friday. Patient taking OTC cold medicine with minimal relief. Patient BP elevate 190/99, pt has hx of hypertension, but not currently on medication. Remaining VSS.

## 2024-01-07 NOTE — DISCHARGE INSTR - COC
Continuity of Care Form    Patient Name: Ramila Orosco   :  1980  MRN:  [id number]    Admit date:  2024  Discharge date:  ***    Code Status Order: Prior   Advance Directives:     Admitting Physician:  No admitting provider for patient encounter.  PCP: Preet Witt PA-C    Discharging Nurse: ***  Discharging Hospital Unit/Room#:   Discharging Unit Phone Number: ***    Emergency Contact:   Extended Emergency Contact Information  Primary Emergency Contact: naomie Rome  Home Phone: [phone]  Mobile Phone: [phone]  Relation: Aunt/Uncle    Past Surgical History:  Past Surgical History:   Procedure Laterality Date    FOOT SURGERY      cyst on right foot as a child    HYSTERECTOMY (CERVIX STATUS UNKNOWN)  2021    HYSTERECTOMY, VAGINAL N/A 2021    LAPAROSCOPIC HYSTERECTOMY, BILATERAL SALPINGECTOMY, LAPAROTOMY ASSISTED MYOMECTOMY performed by She Casas MD at [facility] OR    MYOMECTOMY      UTERINE FIBROID EMBOLIZATION  2020    Dr Darrion Mcrae [phone]       Immunization History:   Immunization History   Administered Date(s) Administered    COVID-19, MODERNA BLUE border, Primary or Immunocompromised, (age 12y+), IM, 100 mcg/0.5mL 2021, 2021    Influenza Virus Vaccine 2020    Influenza, AFLURIA (age 3 yrs+), FLUZONE, (age 6 mo+), MDV, 0.5mL 10/09/2014, 2021    Influenza, FLUARIX, FLULAVAL, FLUZONE (age 6 mo+) AND AFLURIA, (age 3 y+), PF, 0.5mL 2019    Influenza, FLUCELVAX, (age 6 mo+), MDCK, PF, 0.5mL 2021    TDaP, ADACEL (age 10y-64y), BOOSTRIX (age 10y+), IM, 0.5mL 2015       Active Problems:  Patient Active Problem List   Diagnosis Code    Abdominal pain R10.9    Fibroid, uterine D25.9    Bulky or enlarged uterus N85.2    Fibroid uterus D25.9    Postoperative wound infection T81.49XA    Postoperative intra-abdominal abscess T81.43XA       Isolation/Infection:   Isolation            No Isolation          Patient Infection

## 2024-01-07 NOTE — DISCHARGE INSTRUCTIONS
COVID was negative today.   Take your medication as indicated and prescribed.  For pain use acetaminophen (Tylenol) or ibuprofen (Motrin / Advil), unless prescribed medications that have acetaminophen or ibuprofen (or similar medications) in it.  You can take over the counter acetaminophen tablets (1 - 2 tablets of the 500-mg strength every 6 hours) or ibuprofen tablets (2 tablets every 4 hours).    You can use over the counter allergy medication (Allegra, Claritan, Zyrtec, etc) to help with the symptoms.  Drink plenty of water.  Avoid drinking alcohol or drinks that have caffeine.       Placing a humidifier in your room at night may be beneficial for helping with nasal congestion.    PLEASE RETURN TO THE EMERGENCY DEPARTMENT IMMEDIATELY for worsening symptoms, persistent fever, nausea and/or vomiting, or if you develop any concerning symptoms such as: high fever not relieved by acetaminophen (Tylenol) and/or ibuprofen (Motrin / Advil), chills, shortness of breath, chest pain, feeling of your heart fluttering or racing, loss of consciousness, numbness, weakness or tingling in the arms or legs or change in color of the extremities, changes in mental status, persistent headache, blurry vision, loss of bladder / bowel control, unable to follow up with your physician, or other any other care or concern.

## 2024-01-11 ENCOUNTER — HOSPITAL ENCOUNTER (EMERGENCY)
Age: 44
Discharge: HOME | End: 2024-01-11

## 2024-01-11 ENCOUNTER — APPOINTMENT (OUTPATIENT)
Dept: GENERAL RADIOLOGY | Age: 44
End: 2024-01-11

## 2024-01-11 VITALS
TEMPERATURE: 98.1 F | DIASTOLIC BLOOD PRESSURE: 98 MMHG | HEART RATE: 86 BPM | OXYGEN SATURATION: 98 % | BODY MASS INDEX: 45.36 KG/M2 | WEIGHT: 225 LBS | SYSTOLIC BLOOD PRESSURE: 164 MMHG | HEIGHT: 59 IN | RESPIRATION RATE: 20 BRPM

## 2024-01-11 DIAGNOSIS — J02.9 ACUTE PHARYNGITIS, UNSPECIFIED ETIOLOGY: ICD-10-CM

## 2024-01-11 DIAGNOSIS — J32.9 SINOBRONCHITIS: Primary | ICD-10-CM

## 2024-01-11 DIAGNOSIS — J40 SINOBRONCHITIS: Primary | ICD-10-CM

## 2024-01-11 LAB
ALBUMIN SERPL-MCNC: 3.8 G/DL (ref 3.5–4.6)
ALP SERPL-CCNC: 67 U/L (ref 40–130)
ALT SERPL-CCNC: 31 U/L (ref 0–33)
ANION GAP SERPL CALCULATED.3IONS-SCNC: 12 MEQ/L (ref 9–15)
AST SERPL-CCNC: 35 U/L (ref 0–35)
BASOPHILS # BLD: 0 K/UL (ref 0–0.2)
BASOPHILS NFR BLD: 0.3 %
BILIRUB SERPL-MCNC: <0.2 MG/DL (ref 0.2–0.7)
BUN SERPL-MCNC: 13 MG/DL (ref 6–20)
CALCIUM SERPL-MCNC: 9.8 MG/DL (ref 8.5–9.9)
CHLORIDE SERPL-SCNC: 102 MEQ/L (ref 95–107)
CO2 SERPL-SCNC: 26 MEQ/L (ref 20–31)
CREAT SERPL-MCNC: 0.8 MG/DL (ref 0.5–0.9)
EOSINOPHIL # BLD: 0.1 K/UL (ref 0–0.7)
EOSINOPHIL NFR BLD: 1.5 %
ERYTHROCYTE [DISTWIDTH] IN BLOOD BY AUTOMATED COUNT: 13.6 % (ref 11.5–14.5)
GLOBULIN SER CALC-MCNC: 3.8 G/DL (ref 2.3–3.5)
GLUCOSE SERPL-MCNC: 109 MG/DL (ref 70–99)
HCT VFR BLD AUTO: 42.1 % (ref 37–47)
HGB BLD-MCNC: 13.7 G/DL (ref 12–16)
LYMPHOCYTES # BLD: 2 K/UL (ref 1–4.8)
LYMPHOCYTES NFR BLD: 29.5 %
MCH RBC QN AUTO: 28.4 PG (ref 27–31.3)
MCHC RBC AUTO-ENTMCNC: 32.5 % (ref 33–37)
MCV RBC AUTO: 87.3 FL (ref 79.4–94.8)
MONOCYTES # BLD: 0.6 K/UL (ref 0.2–0.8)
MONOCYTES NFR BLD: 8.6 %
NEUTROPHILS # BLD: 4.1 K/UL (ref 1.4–6.5)
NEUTS SEG NFR BLD: 59.7 %
PLATELET # BLD AUTO: 223 K/UL (ref 130–400)
POTASSIUM SERPL-SCNC: 4.9 MEQ/L (ref 3.4–4.9)
PROCALCITONIN SERPL IA-MCNC: 0.05 NG/ML (ref 0–0.15)
PROT SERPL-MCNC: 7.6 G/DL (ref 6.3–8)
RBC # BLD AUTO: 4.82 M/UL (ref 4.2–5.4)
SODIUM SERPL-SCNC: 140 MEQ/L (ref 135–144)
STREP GRP A PCR: NEGATIVE
WBC # BLD AUTO: 6.8 K/UL (ref 4.8–10.8)

## 2024-01-11 PROCEDURE — 96375 TX/PRO/DX INJ NEW DRUG ADDON: CPT

## 2024-01-11 PROCEDURE — 80053 COMPREHEN METABOLIC PANEL: CPT

## 2024-01-11 PROCEDURE — 2580000003 HC RX 258: Performed by: PHYSICIAN ASSISTANT

## 2024-01-11 PROCEDURE — 87651 STREP A DNA AMP PROBE: CPT

## 2024-01-11 PROCEDURE — 96374 THER/PROPH/DIAG INJ IV PUSH: CPT

## 2024-01-11 PROCEDURE — 99284 EMERGENCY DEPT VISIT MOD MDM: CPT

## 2024-01-11 PROCEDURE — 84145 PROCALCITONIN (PCT): CPT

## 2024-01-11 PROCEDURE — 6360000002 HC RX W HCPCS: Performed by: PHYSICIAN ASSISTANT

## 2024-01-11 PROCEDURE — 36415 COLL VENOUS BLD VENIPUNCTURE: CPT

## 2024-01-11 PROCEDURE — 71045 X-RAY EXAM CHEST 1 VIEW: CPT

## 2024-01-11 PROCEDURE — 85025 COMPLETE CBC W/AUTO DIFF WBC: CPT

## 2024-01-11 RX ORDER — KETOROLAC TROMETHAMINE 15 MG/ML
30 INJECTION, SOLUTION INTRAMUSCULAR; INTRAVENOUS ONCE
Status: COMPLETED | OUTPATIENT
Start: 2024-01-11 | End: 2024-01-11

## 2024-01-11 RX ORDER — GUAIFENESIN, PSEUDOEPHEDRINE HYDROCHLORIDE 600; 60 MG/1; MG/1
1 TABLET, EXTENDED RELEASE ORAL EVERY 12 HOURS
Qty: 14 TABLET | Refills: 0 | Status: SHIPPED | OUTPATIENT
Start: 2024-01-11 | End: 2024-01-18

## 2024-01-11 RX ORDER — 0.9 % SODIUM CHLORIDE 0.9 %
1000 INTRAVENOUS SOLUTION INTRAVENOUS ONCE
Status: COMPLETED | OUTPATIENT
Start: 2024-01-11 | End: 2024-01-11

## 2024-01-11 RX ORDER — METHYLPREDNISOLONE SODIUM SUCCINATE 125 MG/2ML
125 INJECTION, POWDER, LYOPHILIZED, FOR SOLUTION INTRAMUSCULAR; INTRAVENOUS ONCE
Status: COMPLETED | OUTPATIENT
Start: 2024-01-11 | End: 2024-01-11

## 2024-01-11 RX ORDER — DEXTROMETHORPHAN HYDROBROMIDE AND PROMETHAZINE HYDROCHLORIDE 15; 6.25 MG/5ML; MG/5ML
5 SYRUP ORAL 4 TIMES DAILY PRN
Qty: 140 ML | Refills: 0 | Status: SHIPPED | OUTPATIENT
Start: 2024-01-11 | End: 2024-01-18

## 2024-01-11 RX ORDER — ETODOLAC 400 MG/1
400 TABLET, FILM COATED ORAL 2 TIMES DAILY
Qty: 14 TABLET | Refills: 0 | Status: SHIPPED | OUTPATIENT
Start: 2024-01-11

## 2024-01-11 RX ORDER — AMOXICILLIN 500 MG/1
500 CAPSULE ORAL 3 TIMES DAILY
Qty: 21 CAPSULE | Refills: 0 | Status: SHIPPED | OUTPATIENT
Start: 2024-01-11 | End: 2024-01-18

## 2024-01-11 RX ADMIN — SODIUM CHLORIDE 1000 ML: 9 INJECTION, SOLUTION INTRAVENOUS at 15:02

## 2024-01-11 RX ADMIN — METHYLPREDNISOLONE SODIUM SUCCINATE 125 MG: 125 INJECTION INTRAMUSCULAR; INTRAVENOUS at 16:15

## 2024-01-11 RX ADMIN — KETOROLAC TROMETHAMINE 30 MG: 15 INJECTION, SOLUTION INTRAMUSCULAR; INTRAVENOUS at 16:16

## 2024-01-11 ASSESSMENT — ENCOUNTER SYMPTOMS
SHORTNESS OF BREATH: 0
VOMITING: 0
CHEST TIGHTNESS: 0
DIARRHEA: 0
COUGH: 1
WHEEZING: 0
SORE THROAT: 1
TROUBLE SWALLOWING: 0

## 2024-01-11 ASSESSMENT — PAIN DESCRIPTION - FREQUENCY: FREQUENCY: CONTINUOUS

## 2024-01-11 ASSESSMENT — PAIN - FUNCTIONAL ASSESSMENT
PAIN_FUNCTIONAL_ASSESSMENT: NONE - DENIES PAIN
PAIN_FUNCTIONAL_ASSESSMENT: 0-10

## 2024-01-11 ASSESSMENT — PAIN DESCRIPTION - PAIN TYPE: TYPE: ACUTE PAIN

## 2024-01-11 ASSESSMENT — PAIN DESCRIPTION - ONSET: ONSET: ON-GOING

## 2024-01-11 ASSESSMENT — LIFESTYLE VARIABLES: HOW OFTEN DO YOU HAVE A DRINK CONTAINING ALCOHOL: NEVER

## 2024-01-11 ASSESSMENT — PAIN DESCRIPTION - DESCRIPTORS: DESCRIPTORS: ACHING

## 2024-01-11 ASSESSMENT — PAIN SCALES - GENERAL: PAINLEVEL_OUTOF10: 9

## 2024-01-11 ASSESSMENT — PAIN DESCRIPTION - LOCATION: LOCATION: THROAT;GENERALIZED

## 2024-01-11 NOTE — ED NOTES
The following labs were labeled with appropriate pt sticker and tubed to lab:     [] Blue     [x] Lavender   [] on ice  [x] Green/yellow  [] Green/black [] on ice  [] Grey  [] on ice  [] Yellow  [] Red  [] Pink  [] Type/ Screen  [] ABG  [] VBG    [] COVID-19 swab    [] Rapid  [] PCR  [] Flu swab  [] Peds Viral Panel     [] Urine Sample  [] Fecal Sample  [] Pelvic Cultures  [] Blood Cultures  [] X 2  [x] STREP Cultures  [] Wound Cultures

## 2024-01-11 NOTE — ED TRIAGE NOTES
Pt to ed from home via triage with complaints of sore throat pain, worsening since last Friday  Pt report that she was seen in ED Sunday for same   Pt reports cough since yesterday with blood tinged sputum today  Pt ambulates with steady gait, respirations even and unlabored. Skin WDI, no signs of acute distress noted

## 2024-01-11 NOTE — ED PROVIDER NOTES
REASSESSMENT        Patient presents emergency department with ongoing URI symptoms after diagnosis of a viral URI 4 days prior.  Given that patient states that she has been having green expectoration that is blood-tinged in nature, I will treat her for secondary bacterial etiology with oral antibiotics, decongestants and antitussives.  Follow-up with PCP.  PERC score essentially negative though could be classified as a long given patient's vague description of blood-tinged hemoptysis, though this sounds more like postnasal drip that was being cleared    Medical Decision Making  Amount and/or Complexity of Data Reviewed  Labs: ordered.  Radiology: ordered.    Risk  Prescription drug management.       Coding     PROCEDURES:    Procedures      FINAL IMPRESSION      1. Sinobronchitis    2. Acute pharyngitis, unspecified etiology          DISPOSITION/PLAN   DISPOSITION Discharge - Pending Orders Complete 01/11/2024 04:05:47 PM      PATIENT REFERRED TO:  Preet Witt PA-C  [address] [facility] [address] [phone]    Call in 2 days        DISCHARGE MEDICATIONS:  New Prescriptions    AMOXICILLIN (AMOXIL) 500 MG CAPSULE    Take 1 capsule by mouth 3 times daily for 7 days    ETODOLAC (LODINE) 400 MG TABLET    Take 1 tablet by mouth 2 times daily    PROMETHAZINE-DEXTROMETHORPHAN (PROMETHAZINE-DM) 6.25-15 MG/5ML SYRUP    Take 5 mLs by mouth 4 times daily as needed for Cough    PSEUDOEPHEDRINE-GUAIFENESIN (MUCINEX D)  MG PER EXTENDED RELEASE TABLET    Take 1 tablet by mouth in the morning and 1 tablet in the evening. Do all this for 7 days.       (Please note that portions of this note were completed with a voice recognition program.  Efforts were made to edit the dictations but occasionally words are mis-transcribed.)    Jerald Johns PA-C    Supervising Physician Jerald Aaron PA-C  01/11/24 7453

## 2024-01-11 NOTE — ED NOTES
Pt provided with discharge instructions, f/u care instructions, and e Rxs. Medication education provided. Verbalizes understanding; denies questions. Pt ambulatory off unit in stable condition.

## 2024-02-07 ENCOUNTER — HOSPITAL ENCOUNTER (EMERGENCY)
Age: 44
Discharge: HOME | End: 2024-02-07
Payer: MEDICAID

## 2024-02-07 VITALS
DIASTOLIC BLOOD PRESSURE: 116 MMHG | BODY MASS INDEX: 43.43 KG/M2 | WEIGHT: 230 LBS | OXYGEN SATURATION: 100 % | SYSTOLIC BLOOD PRESSURE: 145 MMHG | HEIGHT: 61 IN | TEMPERATURE: 98.6 F | RESPIRATION RATE: 19 BRPM | HEART RATE: 77 BPM

## 2024-02-07 DIAGNOSIS — E86.0 DEHYDRATION: ICD-10-CM

## 2024-02-07 DIAGNOSIS — R42 DIZZINESS: Primary | ICD-10-CM

## 2024-02-07 LAB
ALBUMIN SERPL-MCNC: 4.1 G/DL (ref 3.5–4.6)
ALP SERPL-CCNC: 64 U/L (ref 40–130)
ALT SERPL-CCNC: 25 U/L (ref 0–33)
ANION GAP SERPL CALCULATED.3IONS-SCNC: 11 MEQ/L (ref 9–15)
AST SERPL-CCNC: 26 U/L (ref 0–35)
BASOPHILS # BLD: 0 K/UL (ref 0–0.2)
BASOPHILS NFR BLD: 0.4 %
BILIRUB SERPL-MCNC: 0.3 MG/DL (ref 0.2–0.7)
BILIRUB UR QL STRIP: NEGATIVE
BUN SERPL-MCNC: 10 MG/DL (ref 6–20)
CALCIUM SERPL-MCNC: 9.2 MG/DL (ref 8.5–9.9)
CHLORIDE SERPL-SCNC: 103 MEQ/L (ref 95–107)
CLARITY UR: CLEAR
CO2 SERPL-SCNC: 24 MEQ/L (ref 20–31)
COLOR UR: YELLOW
CREAT SERPL-MCNC: 0.68 MG/DL (ref 0.5–0.9)
EOSINOPHIL # BLD: 0.1 K/UL (ref 0–0.7)
EOSINOPHIL NFR BLD: 1.3 %
ERYTHROCYTE [DISTWIDTH] IN BLOOD BY AUTOMATED COUNT: 15.1 % (ref 11.5–14.5)
GLOBULIN SER CALC-MCNC: 3.6 G/DL (ref 2.3–3.5)
GLUCOSE SERPL-MCNC: 107 MG/DL (ref 70–99)
GLUCOSE UR STRIP-MCNC: 100 MG/DL
HCT VFR BLD AUTO: 40.1 % (ref 37–47)
HGB BLD-MCNC: 13.5 G/DL (ref 12–16)
HGB UR QL STRIP: NEGATIVE
INFLUENZA A BY PCR: NEGATIVE
INFLUENZA B BY PCR: NEGATIVE
KETONES UR STRIP-MCNC: NEGATIVE MG/DL
LEUKOCYTE ESTERASE UR QL STRIP: NEGATIVE
LYMPHOCYTES # BLD: 1.7 K/UL (ref 1–4.8)
LYMPHOCYTES NFR BLD: 37 %
MCH RBC QN AUTO: 28.5 PG (ref 27–31.3)
MCHC RBC AUTO-ENTMCNC: 33.7 % (ref 33–37)
MCV RBC AUTO: 84.8 FL (ref 79.4–94.8)
MONOCYTES # BLD: 0.4 K/UL (ref 0.2–0.8)
MONOCYTES NFR BLD: 7.5 %
NEUTROPHILS # BLD: 2.5 K/UL (ref 1.4–6.5)
NEUTS SEG NFR BLD: 53.2 %
NITRITE UR QL STRIP: NEGATIVE
PH UR STRIP: 8 [PH] (ref 5–9)
PLATELET # BLD AUTO: 234 K/UL (ref 130–400)
POTASSIUM SERPL-SCNC: 4.1 MEQ/L (ref 3.4–4.9)
PROT SERPL-MCNC: 7.7 G/DL (ref 6.3–8)
PROT UR STRIP-MCNC: NEGATIVE MG/DL
RBC # BLD AUTO: 4.73 M/UL (ref 4.2–5.4)
REASON FOR REJECTION: NORMAL
REJECTED TEST: NORMAL
SARS-COV-2 RDRP RESP QL NAA+PROBE: NOT DETECTED
SODIUM SERPL-SCNC: 138 MEQ/L (ref 135–144)
SP GR UR STRIP: 1.01 (ref 1–1.03)
URINE REFLEX TO CULTURE: ABNORMAL
UROBILINOGEN UR STRIP-ACNC: 0.2 E.U./DL
WBC # BLD AUTO: 4.7 K/UL (ref 4.8–10.8)

## 2024-02-07 PROCEDURE — 87635 SARS-COV-2 COVID-19 AMP PRB: CPT

## 2024-02-07 PROCEDURE — 96374 THER/PROPH/DIAG INJ IV PUSH: CPT

## 2024-02-07 PROCEDURE — 6360000002 HC RX W HCPCS: Performed by: PHYSICIAN ASSISTANT

## 2024-02-07 PROCEDURE — 96361 HYDRATE IV INFUSION ADD-ON: CPT

## 2024-02-07 PROCEDURE — 36415 COLL VENOUS BLD VENIPUNCTURE: CPT

## 2024-02-07 PROCEDURE — 87502 INFLUENZA DNA AMP PROBE: CPT

## 2024-02-07 PROCEDURE — 81003 URINALYSIS AUTO W/O SCOPE: CPT

## 2024-02-07 PROCEDURE — 80053 COMPREHEN METABOLIC PANEL: CPT

## 2024-02-07 PROCEDURE — 99284 EMERGENCY DEPT VISIT MOD MDM: CPT

## 2024-02-07 PROCEDURE — 85025 COMPLETE CBC W/AUTO DIFF WBC: CPT

## 2024-02-07 PROCEDURE — 2580000003 HC RX 258: Performed by: PHYSICIAN ASSISTANT

## 2024-02-07 PROCEDURE — 6370000000 HC RX 637 (ALT 250 FOR IP): Performed by: PHYSICIAN ASSISTANT

## 2024-02-07 RX ORDER — SODIUM CHLORIDE 9 MG/ML
INJECTION, SOLUTION INTRAVENOUS
Status: DISPENSED
Start: 2024-02-07 | End: 2024-02-07

## 2024-02-07 RX ORDER — KETOROLAC TROMETHAMINE 15 MG/ML
15 INJECTION, SOLUTION INTRAMUSCULAR; INTRAVENOUS ONCE
Status: COMPLETED | OUTPATIENT
Start: 2024-02-07 | End: 2024-02-07

## 2024-02-07 RX ORDER — MECLIZINE HYDROCHLORIDE 25 MG/1
25 TABLET ORAL ONCE
Status: COMPLETED | OUTPATIENT
Start: 2024-02-07 | End: 2024-02-07

## 2024-02-07 RX ORDER — 0.9 % SODIUM CHLORIDE 0.9 %
1000 INTRAVENOUS SOLUTION INTRAVENOUS ONCE
Status: COMPLETED | OUTPATIENT
Start: 2024-02-07 | End: 2024-02-07

## 2024-02-07 RX ADMIN — SODIUM CHLORIDE 1000 ML: 9 INJECTION, SOLUTION INTRAVENOUS at 09:54

## 2024-02-07 RX ADMIN — MECLIZINE HYDROCHLORIDE 25 MG: 25 TABLET ORAL at 10:00

## 2024-02-07 RX ADMIN — KETOROLAC TROMETHAMINE 15 MG: 15 INJECTION, SOLUTION INTRAMUSCULAR; INTRAVENOUS at 09:59

## 2024-02-07 ASSESSMENT — PAIN - FUNCTIONAL ASSESSMENT: PAIN_FUNCTIONAL_ASSESSMENT: 0-10

## 2024-02-07 ASSESSMENT — ENCOUNTER SYMPTOMS
EYE DISCHARGE: 0
SORE THROAT: 0
RHINORRHEA: 0
SHORTNESS OF BREATH: 0
ABDOMINAL PAIN: 0
ABDOMINAL DISTENTION: 0
COLOR CHANGE: 0
VOMITING: 0
NAUSEA: 0
CONSTIPATION: 0

## 2024-02-07 ASSESSMENT — PAIN DESCRIPTION - PAIN TYPE: TYPE: ACUTE PAIN

## 2024-02-07 ASSESSMENT — PAIN DESCRIPTION - ORIENTATION
ORIENTATION: ANTERIOR
ORIENTATION: ANTERIOR

## 2024-02-07 ASSESSMENT — PAIN DESCRIPTION - DESCRIPTORS
DESCRIPTORS: ACHING
DESCRIPTORS: ACHING

## 2024-02-07 ASSESSMENT — PAIN DESCRIPTION - ONSET: ONSET: ON-GOING

## 2024-02-07 ASSESSMENT — PAIN SCALES - GENERAL
PAINLEVEL_OUTOF10: 8
PAINLEVEL_OUTOF10: 10

## 2024-02-07 ASSESSMENT — PAIN DESCRIPTION - LOCATION
LOCATION: HEAD
LOCATION: HEAD

## 2024-02-07 ASSESSMENT — PAIN DESCRIPTION - FREQUENCY: FREQUENCY: CONTINUOUS

## 2024-02-07 NOTE — ED PROVIDER NOTES
145/116    Pulse: 87 77    Resp: 18 18 19   Temp: 98.6 °F (37 °C)     TempSrc: Oral     SpO2: 97% 100% 100%   Weight: 104.3 kg (230 lb)     Height: 1.549 m (5' 1\")             Medical Decision Making  Patient presented to ED with complaint of frontal headache, dizziness, which she states been intermittently occurring since this past Friday.  On arrival, she is alert, oriented, she was able to ambulate from her exam room to the bathroom without any significant distress, there was no dizziness, orthostatics were positive though on evaluation the patient was given IV fluids, she states she is feeling much better after the IV fluids, at this time patient will be discharged home with a diagnosis of dehydration, dizziness, she was advised to increase oral fluid intake, follow-up with her family provider, should she have any worsening change in condition, advised to return to the ED for reevaluation.    Amount and/or Complexity of Data Reviewed  Labs: ordered.    Risk  Prescription drug management.            REASSESSMENT          CRITICAL CARE TIME   Total Critical Care time was  minutes, excluding separately reportable procedures.  There was a high probability of clinically significant/life threatening deterioration in the patient's condition which required my urgent intervention.      CONSULTS:  None    PROCEDURES:  Unless otherwise noted below, none     Procedures        FINAL IMPRESSION      1. Dizziness    2. Dehydration          DISPOSITION/PLAN   DISPOSITION Decision To Discharge 02/07/2024 11:42:42 AM      PATIENT REFERRED TO:  Preet Witt, RANJIT  [address] [facility] [address]  [phone]    In 2 days        DISCHARGE MEDICATIONS:  New Prescriptions    No medications on file     Controlled Substances Monitoring:     RX Monitoring Attestation   10/12/2017   2:59 PM The Prescription Monitoring Report for this patient was reviewed today.       (Please note that portions of this note were completed with a

## 2024-02-07 NOTE — ED NOTES
Discharge  instructions given and reviewed. Patient verbalized understanding. Patient ambulated out of ED with a steady gait to POV.

## 2024-02-16 ENCOUNTER — APPOINTMENT (OUTPATIENT)
Dept: CT IMAGING | Age: 44
End: 2024-02-16
Payer: MEDICAID

## 2024-02-16 ENCOUNTER — HOSPITAL ENCOUNTER (EMERGENCY)
Age: 44
Discharge: HOME | End: 2024-02-16
Payer: MEDICAID

## 2024-02-16 ENCOUNTER — APPOINTMENT (OUTPATIENT)
Dept: GENERAL RADIOLOGY | Age: 44
End: 2024-02-16
Payer: MEDICAID

## 2024-02-16 VITALS
TEMPERATURE: 98.2 F | SYSTOLIC BLOOD PRESSURE: 145 MMHG | BODY MASS INDEX: 49.09 KG/M2 | HEIGHT: 61 IN | RESPIRATION RATE: 16 BRPM | HEART RATE: 60 BPM | WEIGHT: 260 LBS | DIASTOLIC BLOOD PRESSURE: 83 MMHG | OXYGEN SATURATION: 97 %

## 2024-02-16 DIAGNOSIS — M54.50 ACUTE BILATERAL LOW BACK PAIN WITHOUT SCIATICA: Primary | ICD-10-CM

## 2024-02-16 DIAGNOSIS — N83.202 LEFT OVARIAN CYST: ICD-10-CM

## 2024-02-16 LAB
ALBUMIN SERPL-MCNC: 4 G/DL (ref 3.5–4.6)
ALP SERPL-CCNC: 69 U/L (ref 40–130)
ALT SERPL-CCNC: 21 U/L (ref 0–33)
ANION GAP SERPL CALCULATED.3IONS-SCNC: 13 MEQ/L (ref 9–15)
AST SERPL-CCNC: 24 U/L (ref 0–35)
BASOPHILS # BLD: 0 K/UL (ref 0–0.2)
BASOPHILS NFR BLD: 0.2 %
BILIRUB SERPL-MCNC: <0.2 MG/DL (ref 0.2–0.7)
BILIRUB UR QL STRIP: NEGATIVE
BUN SERPL-MCNC: 9 MG/DL (ref 6–20)
CALCIUM SERPL-MCNC: 9.1 MG/DL (ref 8.5–9.9)
CHLORIDE SERPL-SCNC: 106 MEQ/L (ref 95–107)
CLARITY UR: ABNORMAL
CO2 SERPL-SCNC: 24 MEQ/L (ref 20–31)
COLOR UR: YELLOW
CREAT SERPL-MCNC: 0.66 MG/DL (ref 0.5–0.9)
EOSINOPHIL # BLD: 0.1 K/UL (ref 0–0.7)
EOSINOPHIL NFR BLD: 1.1 %
ERYTHROCYTE [DISTWIDTH] IN BLOOD BY AUTOMATED COUNT: 13.2 % (ref 11.5–14.5)
GLOBULIN SER CALC-MCNC: 3.6 G/DL (ref 2.3–3.5)
GLUCOSE SERPL-MCNC: 123 MG/DL (ref 70–99)
GLUCOSE UR STRIP-MCNC: 250 MG/DL
HCT VFR BLD AUTO: 42.3 % (ref 37–47)
HGB BLD-MCNC: 13.7 G/DL (ref 12–16)
HGB UR QL STRIP: NEGATIVE
KETONES UR STRIP-MCNC: NEGATIVE MG/DL
LEUKOCYTE ESTERASE UR QL STRIP: NEGATIVE
LIPASE SERPL-CCNC: 24 U/L (ref 12–95)
LYMPHOCYTES # BLD: 2.1 K/UL (ref 1–4.8)
LYMPHOCYTES NFR BLD: 46 %
MCH RBC QN AUTO: 28.1 PG (ref 27–31.3)
MCHC RBC AUTO-ENTMCNC: 32.4 % (ref 33–37)
MCV RBC AUTO: 86.7 FL (ref 79.4–94.8)
MONOCYTES # BLD: 0.3 K/UL (ref 0.2–0.8)
MONOCYTES NFR BLD: 5.6 %
NEUTROPHILS # BLD: 2.2 K/UL (ref 1.4–6.5)
NEUTS SEG NFR BLD: 46.9 %
NITRITE UR QL STRIP: NEGATIVE
PERFORMED ON: NORMAL
PH UR STRIP: 6.5 [PH] (ref 5–9)
PLATELET # BLD AUTO: 250 K/UL (ref 130–400)
POC CREATININE: 0.8 MG/DL (ref 0.6–1.2)
POC SAMPLE TYPE: NORMAL
POTASSIUM SERPL-SCNC: 3.7 MEQ/L (ref 3.4–4.9)
PROT SERPL-MCNC: 7.6 G/DL (ref 6.3–8)
PROT UR STRIP-MCNC: NEGATIVE MG/DL
RBC # BLD AUTO: 4.88 M/UL (ref 4.2–5.4)
SODIUM SERPL-SCNC: 143 MEQ/L (ref 135–144)
SP GR UR STRIP: 1.02 (ref 1–1.03)
URINE REFLEX TO CULTURE: ABNORMAL
UROBILINOGEN UR STRIP-ACNC: 0.2 E.U./DL
WBC # BLD AUTO: 4.6 K/UL (ref 4.8–10.8)

## 2024-02-16 PROCEDURE — 99285 EMERGENCY DEPT VISIT HI MDM: CPT

## 2024-02-16 PROCEDURE — 80053 COMPREHEN METABOLIC PANEL: CPT

## 2024-02-16 PROCEDURE — 83690 ASSAY OF LIPASE: CPT

## 2024-02-16 PROCEDURE — 74177 CT ABD & PELVIS W/CONTRAST: CPT

## 2024-02-16 PROCEDURE — 85025 COMPLETE CBC W/AUTO DIFF WBC: CPT

## 2024-02-16 PROCEDURE — 2580000003 HC RX 258

## 2024-02-16 PROCEDURE — 6360000004 HC RX CONTRAST MEDICATION

## 2024-02-16 PROCEDURE — 36415 COLL VENOUS BLD VENIPUNCTURE: CPT

## 2024-02-16 PROCEDURE — 96374 THER/PROPH/DIAG INJ IV PUSH: CPT

## 2024-02-16 PROCEDURE — 72110 X-RAY EXAM L-2 SPINE 4/>VWS: CPT

## 2024-02-16 PROCEDURE — 6360000002 HC RX W HCPCS

## 2024-02-16 PROCEDURE — 96375 TX/PRO/DX INJ NEW DRUG ADDON: CPT

## 2024-02-16 PROCEDURE — 81003 URINALYSIS AUTO W/O SCOPE: CPT

## 2024-02-16 RX ORDER — 0.9 % SODIUM CHLORIDE 0.9 %
1000 INTRAVENOUS SOLUTION INTRAVENOUS ONCE
Status: COMPLETED | OUTPATIENT
Start: 2024-02-16 | End: 2024-02-16

## 2024-02-16 RX ORDER — NAPROXEN 500 MG/1
500 TABLET ORAL 2 TIMES DAILY WITH MEALS
Qty: 60 TABLET | Refills: 0 | Status: SHIPPED | OUTPATIENT
Start: 2024-02-16

## 2024-02-16 RX ORDER — CYCLOBENZAPRINE HCL 10 MG
10 TABLET ORAL 3 TIMES DAILY PRN
Qty: 9 TABLET | Refills: 0 | Status: SHIPPED | OUTPATIENT
Start: 2024-02-16 | End: 2024-02-19

## 2024-02-16 RX ORDER — ONDANSETRON 2 MG/ML
4 INJECTION INTRAMUSCULAR; INTRAVENOUS ONCE
Status: COMPLETED | OUTPATIENT
Start: 2024-02-16 | End: 2024-02-16

## 2024-02-16 RX ORDER — KETOROLAC TROMETHAMINE 30 MG/ML
30 INJECTION, SOLUTION INTRAMUSCULAR; INTRAVENOUS ONCE
Status: COMPLETED | OUTPATIENT
Start: 2024-02-16 | End: 2024-02-16

## 2024-02-16 RX ADMIN — ONDANSETRON 4 MG: 2 INJECTION INTRAMUSCULAR; INTRAVENOUS at 12:53

## 2024-02-16 RX ADMIN — KETOROLAC TROMETHAMINE 30 MG: 30 INJECTION INTRAMUSCULAR; INTRAVENOUS at 12:53

## 2024-02-16 RX ADMIN — SODIUM CHLORIDE 1000 ML: 9 INJECTION, SOLUTION INTRAVENOUS at 12:52

## 2024-02-16 RX ADMIN — IOPAMIDOL 75 ML: 755 INJECTION, SOLUTION INTRAVENOUS at 13:59

## 2024-02-16 ASSESSMENT — PAIN DESCRIPTION - ONSET: ONSET: ON-GOING

## 2024-02-16 ASSESSMENT — PAIN DESCRIPTION - LOCATION: LOCATION: FLANK

## 2024-02-16 ASSESSMENT — PAIN DESCRIPTION - FREQUENCY: FREQUENCY: INTERMITTENT

## 2024-02-16 ASSESSMENT — LIFESTYLE VARIABLES
HOW MANY STANDARD DRINKS CONTAINING ALCOHOL DO YOU HAVE ON A TYPICAL DAY: 1 OR 2
HOW OFTEN DO YOU HAVE A DRINK CONTAINING ALCOHOL: MONTHLY OR LESS

## 2024-02-16 ASSESSMENT — PAIN SCALES - GENERAL
PAINLEVEL_OUTOF10: 10
PAINLEVEL_OUTOF10: 8

## 2024-02-16 ASSESSMENT — PAIN DESCRIPTION - DESCRIPTORS: DESCRIPTORS: SHARP

## 2024-02-16 ASSESSMENT — PAIN DESCRIPTION - PAIN TYPE: TYPE: CHRONIC PAIN

## 2024-02-16 ASSESSMENT — PAIN - FUNCTIONAL ASSESSMENT: PAIN_FUNCTIONAL_ASSESSMENT: 0-10

## 2024-02-16 NOTE — DISCHARGE INSTRUCTIONS
Take medications as directed.    Follow-up with PCP, OB/GYN.    Return to ED if any new, or worsening symptoms.

## 2024-02-19 NOTE — ED PROVIDER NOTES
[facility] ED  EMERGENCY DEPARTMENT ENCOUNTER      Pt Name: Ramila Perez  MRN: [id number]  Birthdate 1980  Date of evaluation: 2/16/2024  Provider: PRISCILLA Merino  6:36 AM EST    CHIEF COMPLAINT       Chief Complaint   Patient presents with    Flank Pain     Left flank pain w/o radiation X1 wk,          HISTORY OF PRESENT ILLNESS   (Location/Symptom, Timing/Onset, Context/Setting, Quality, Duration, Modifying Factors, Severity)  Note limiting factors.   Ramila Perez is a 43 y.o. female whom per chart review has a PMHx of hypertension presents to ED for evaluation of flank pain.  Patient reports L flank pain which she states been present for the last week.  Patient denies any associated symptoms.  Denies taking any OTC medications for relief of symptoms.  Patient does report the pain increases with ambulation.  Patient denies injury, trauma.  Patient verbalizes no additional complaints.  Patient denies chest pain, shortness of breath, N/V/D, fever, chills, hematuria, dysuria, urinary frequency/urgency, saddle anesthesia, incontinence of bowel bladder, numbness/tingling/weakness to BUE and BLE.    HPI    Nursing Notes were reviewed.    REVIEW OF SYSTEMS    (2-9 systems for level 4, 10 or more for level 5)     Review of Systems   Genitourinary:  Positive for flank pain (left).   All other systems reviewed and are negative.      Except as noted above the remainder of the review of systems was reviewed and negative.       PAST MEDICAL HISTORY     Past Medical History:   Diagnosis Date    Abnormal Pap smear of cervix     Breast disorder     Fibroid     Hypertension          SURGICAL HISTORY       Past Surgical History:   Procedure Laterality Date    FOOT SURGERY      cyst on right foot as a child    HYSTERECTOMY (CERVIX STATUS UNKNOWN)  Sept 2021    HYSTERECTOMY, VAGINAL N/A 09/16/2021    LAPAROSCOPIC HYSTERECTOMY, BILATERAL SALPINGECTOMY, LAPAROTOMY ASSISTED MYOMECTOMY performed by

## 2024-02-25 ASSESSMENT — PATIENT HEALTH QUESTIONNAIRE - PHQ9
SUM OF ALL RESPONSES TO PHQ QUESTIONS 1-9: 0
SUM OF ALL RESPONSES TO PHQ9 QUESTIONS 1 & 2: 0
SUM OF ALL RESPONSES TO PHQ QUESTIONS 1-9: 0
1. LITTLE INTEREST OR PLEASURE IN DOING THINGS: NOT AT ALL
SUM OF ALL RESPONSES TO PHQ QUESTIONS 1-9: 0
SUM OF ALL RESPONSES TO PHQ QUESTIONS 1-9: 0
2. FEELING DOWN, DEPRESSED OR HOPELESS: NOT AT ALL

## 2024-03-01 ASSESSMENT — PATIENT HEALTH QUESTIONNAIRE - PHQ9
SUM OF ALL RESPONSES TO PHQ9 QUESTIONS 1 & 2: 0
2. FEELING DOWN, DEPRESSED OR HOPELESS: NOT AT ALL
1. LITTLE INTEREST OR PLEASURE IN DOING THINGS: NOT AT ALL

## 2024-03-20 ENCOUNTER — OFFICE VISIT (OUTPATIENT)
Dept: BARIATRICS/WEIGHT MGMT | Age: 44
End: 2024-03-20
Payer: MEDICAID

## 2024-03-20 ENCOUNTER — PREP FOR PROCEDURE (OUTPATIENT)
Dept: BARIATRICS/WEIGHT MGMT | Age: 44
End: 2024-03-20

## 2024-03-20 VITALS
HEART RATE: 82 BPM | OXYGEN SATURATION: 98 % | WEIGHT: 233.8 LBS | HEIGHT: 60 IN | SYSTOLIC BLOOD PRESSURE: 130 MMHG | BODY MASS INDEX: 45.9 KG/M2 | DIASTOLIC BLOOD PRESSURE: 72 MMHG

## 2024-03-20 DIAGNOSIS — K21.9 GASTROESOPHAGEAL REFLUX DISEASE, UNSPECIFIED WHETHER ESOPHAGITIS PRESENT: ICD-10-CM

## 2024-03-20 DIAGNOSIS — E66.9 DIABETES MELLITUS TYPE 2 IN OBESE (HCC): ICD-10-CM

## 2024-03-20 DIAGNOSIS — E66.01 MORBID OBESITY (HCC): Primary | ICD-10-CM

## 2024-03-20 DIAGNOSIS — E66.01 MORBID OBESITY (HCC): ICD-10-CM

## 2024-03-20 DIAGNOSIS — E11.69 DIABETES MELLITUS TYPE 2 IN OBESE (HCC): ICD-10-CM

## 2024-03-20 LAB
HBA1C MFR BLD: 6.7 % (ref 4.8–5.9)
TSH REFLEX: 2.45 UIU/ML (ref 0.44–3.86)

## 2024-03-20 PROCEDURE — 3044F HG A1C LEVEL LT 7.0%: CPT | Performed by: SURGERY

## 2024-03-20 PROCEDURE — 99205 OFFICE O/P NEW HI 60 MIN: CPT | Performed by: SURGERY

## 2024-03-20 NOTE — PATIENT INSTRUCTIONS
Make the following appointments:    Dietician, Linda Donohue.  Psychologist, Dr. Bryant Ward.  Schedule screening upper endoscopy with Dr. Lazo. No aspirin, ibuprofen or other non-steroidal anti-inflammatory drugs (NSAIDs) 7 days prior to surgery and endoscopy    No food or drink six hours prior to surgery.       Sleep Medicine referral for Sleep Apnea assessment and treatment.      If you have [facility] insurance and do not want to wait 6 months for surgery given that there is no scientific data to support such a delay, call [phone] to switch to a [employer] medicaid plan.    THE BIG FOUR GUIDELINES:  1.  Count calories!  People who count calories eat less.  Use the daily plate or other apps for your smart phone or computer.  The more you count the more of an expert you will become and will get easier and easier.  If you are trying to lose weight and exercising a lot, keep calories to around the thousand to 1200 a day.  If you are not exercising consistently try to limit them to around 800 a day.    2.  Separate liquids and solids.  Wait 30 minutes to an hour after you eat before drinking liquids.  This will reduce the total amount of food you eat in the meal.    3.  Exercise!  You need up to 5 hours a week with a combination of cardio and resistance or weight training in order to keep excess body fat off.    4.  Sleep!   Try to get 7 or more hours of sleep a night.  If you have obstructive sleep apnea definitely use your CPAP machine.    THE RULE OF 20'S:  For every meal, chew each bite 20 seconds.  Then put the fork down and wait 20 seconds after you swallow before picking up the fork again.  Each meal should take at least 20 minutes so your brain can register that you are full.

## 2024-03-20 NOTE — PROGRESS NOTES
Surgery Consultation    Patient Name:  :  Hospital Day:   Ramila Perez 1980 [unfilled]     Date of Service: 3/20/2024    Subjective:      History of Present Illness:    Ramila Perez  is a 43 y.o. female referred by Dr. Casas for morbid obesity, type II diabetes, and GERD.  Ramila Perez reports multiple episodes of weight loss and regain after multiple diet and exercise programs. There has not been an evaluation for sleep apnea. She has severe acid reflux when off PPI.  There is not currently a regular exercise routine due to joint pain.      Ramila denies alcohol or nicotine use.      Past Medical History:   Diagnosis Date    Abnormal Pap smear of cervix     Breast disorder     Fibroid     Hypertension     Past Surgical History:   Procedure Laterality Date    FOOT SURGERY      cyst on right foot as a child    HYSTERECTOMY (CERVIX STATUS UNKNOWN)  2021    HYSTERECTOMY, VAGINAL N/A 2021    LAPAROSCOPIC HYSTERECTOMY, BILATERAL SALPINGECTOMY, LAPAROTOMY ASSISTED MYOMECTOMY performed by She Casas MD at [facility] [place] OR    MYOMECTOMY      UTERINE FIBROID EMBOLIZATION  2020    Dr Darrion Mcrae [phone]        Family History   Problem Relation Age of Onset    Diabetes Paternal Grandmother     Diabetes Father     Diabetes Paternal Aunt     Diabetes Paternal Uncle     Breast Cancer Neg Hx     Cancer Neg Hx     Colon Cancer Neg Hx     Eclampsia Neg Hx     Ovarian Cancer Neg Hx     Hypertension Neg Hx      Labor Neg Hx     Spont Abortions Neg Hx     Stroke Neg Hx     Social History     Tobacco Use    Smoking status: Former     Current packs/day: 0.00     Types: Cigarettes     Quit date: 2012     Years since quittin.5    Smokeless tobacco: Never   Vaping Use    Vaping Use: Never used   Substance Use Topics    Alcohol use: Not Currently     Alcohol/week: 2.0 standard drinks of alcohol     Types: 2 Shots of liquor per week     Comment: On \"occasion liquor\"

## 2024-03-21 LAB
AMPHET CTO UR CFM-MCNC: NEGATIVE NG/ML
BARBITURATES CTO UR CFM-MCNC: NEGATIVE NG/ML
BENZODIAZ CTO UR CFM-MCNC: NEGATIVE NG/ML
CANNABINOIDS CTO UR CFM-MCNC: NEGATIVE NG/ML
COCAINE CTO UR CFM-MCNC: NEGATIVE NG/ML
CREAT UR-MCNC: 97 MG/DL (ref 20–400)
Lab: NORMAL
OPIATES CTO UR CFM-MCNC: NEGATIVE NG/ML
PCP CTO UR CFM-MCNC: NEGATIVE NG/ML
VITAMIN B-12: 977 PG/ML (ref 232–1245)

## 2024-03-23 LAB — VIT B1 SERPL-MCNC: 12 NMOL/L (ref 4–15)

## 2024-03-26 ENCOUNTER — OFFICE VISIT (OUTPATIENT)
Dept: OBGYN CLINIC | Age: 44
End: 2024-03-26
Payer: MEDICAID

## 2024-03-26 ENCOUNTER — OFFICE VISIT (OUTPATIENT)
Dept: FAMILY MEDICINE CLINIC | Age: 44
End: 2024-03-26
Payer: MEDICAID

## 2024-03-26 VITALS
RESPIRATION RATE: 16 BRPM | HEART RATE: 82 BPM | HEIGHT: 60 IN | WEIGHT: 235.23 LBS | SYSTOLIC BLOOD PRESSURE: 118 MMHG | DIASTOLIC BLOOD PRESSURE: 80 MMHG | OXYGEN SATURATION: 97 % | BODY MASS INDEX: 46.18 KG/M2

## 2024-03-26 VITALS
HEART RATE: 76 BPM | HEIGHT: 61 IN | SYSTOLIC BLOOD PRESSURE: 130 MMHG | WEIGHT: 237 LBS | DIASTOLIC BLOOD PRESSURE: 86 MMHG | BODY MASS INDEX: 44.75 KG/M2

## 2024-03-26 DIAGNOSIS — Z90.710 S/P HYSTERECTOMY: ICD-10-CM

## 2024-03-26 DIAGNOSIS — Z11.59 NEED FOR HEPATITIS C SCREENING TEST: ICD-10-CM

## 2024-03-26 DIAGNOSIS — Z11.4 ENCOUNTER FOR SCREENING FOR HIV: ICD-10-CM

## 2024-03-26 DIAGNOSIS — R10.2 PELVIC PAIN: ICD-10-CM

## 2024-03-26 DIAGNOSIS — N83.209 CYST OF OVARY, UNSPECIFIED LATERALITY: Primary | ICD-10-CM

## 2024-03-26 DIAGNOSIS — N62 BREAST HYPERTROPHY IN FEMALE: ICD-10-CM

## 2024-03-26 DIAGNOSIS — N39.41 URGE INCONTINENCE: ICD-10-CM

## 2024-03-26 DIAGNOSIS — N89.8 VAGINAL DRYNESS: ICD-10-CM

## 2024-03-26 DIAGNOSIS — E66.01 MORBID OBESITY (HCC): Primary | ICD-10-CM

## 2024-03-26 DIAGNOSIS — E11.9 DIABETES MELLITUS, NEW ONSET (HCC): ICD-10-CM

## 2024-03-26 PROBLEM — D25.9 FIBROID, UTERINE: Status: RESOLVED | Noted: 2020-04-13 | Resolved: 2024-03-26

## 2024-03-26 PROBLEM — T81.49XA POSTOPERATIVE WOUND INFECTION: Status: RESOLVED | Noted: 2021-09-26 | Resolved: 2024-03-26

## 2024-03-26 PROBLEM — R10.9 ABDOMINAL PAIN: Status: RESOLVED | Noted: 2020-03-11 | Resolved: 2024-03-26

## 2024-03-26 PROBLEM — D25.9 FIBROID UTERUS: Status: RESOLVED | Noted: 2021-09-16 | Resolved: 2024-03-26

## 2024-03-26 PROBLEM — T81.43XA POSTOPERATIVE INTRA-ABDOMINAL ABSCESS: Status: RESOLVED | Noted: 2021-09-26 | Resolved: 2024-03-26

## 2024-03-26 PROBLEM — K65.1 POSTOPERATIVE INTRA-ABDOMINAL ABSCESS (HCC): Status: RESOLVED | Noted: 2021-09-26 | Resolved: 2024-03-26

## 2024-03-26 PROCEDURE — 3044F HG A1C LEVEL LT 7.0%: CPT | Performed by: PHYSICIAN ASSISTANT

## 2024-03-26 PROCEDURE — 99213 OFFICE O/P EST LOW 20 MIN: CPT | Performed by: OBSTETRICS & GYNECOLOGY

## 2024-03-26 PROCEDURE — 99214 OFFICE O/P EST MOD 30 MIN: CPT | Performed by: PHYSICIAN ASSISTANT

## 2024-03-26 RX ORDER — OXYBUTYNIN CHLORIDE 15 MG/1
15 TABLET, EXTENDED RELEASE ORAL DAILY
Qty: 15 TABLET | Refills: 0 | Status: SHIPPED | OUTPATIENT
Start: 2024-03-26

## 2024-03-26 ASSESSMENT — ENCOUNTER SYMPTOMS
VOMITING: 0
NAUSEA: 0
SHORTNESS OF BREATH: 0
PHOTOPHOBIA: 0
DIARRHEA: 0
BLOOD IN STOOL: 0
ABDOMINAL PAIN: 0
CHEST TIGHTNESS: 0

## 2024-03-26 NOTE — PROGRESS NOTES
Subjective  Ramila Perez, 43 y.o. female presents today with:  Chief Complaint   Patient presents with    New Patient     Previous PCP Dr. Newell, referred by Dr. Casas no current concerns        HPI  In the office today to establish care.  Previous PCP: Dr. Real Newell.     Currently in the process of starting bariatric surgery with Dr. Lazo and team.  She had initial visit, labs drawn.  Doesn't know results yet.      Previous history of abnormal BP.  She was on medication for blood pressure, but they made Ramila feel extremely dizzy/light-headed.  She has been off of medications for several months, she is currently normotensive.    No chest pain.      Followed by Dr. Casas for vaginal dryness, pelvic pain.  History of hysterectomy.  She is having some vaginal dryness; couldn't tolerate topical estrogen due to messiness of the product.  She is taking oral estrogen.    Would like to discuss recurrent pelvic pain.  Pain has gotten so severe that she has presented to ED.  Noted to have 4.5 cm L ovarian cyst.      She has a very strong family history of diabetes.  Recently had labs drawn with Dr. Lazo, noted to have HgbA1c on 6.7%.  Unaware that she is diabetic.      History of breast hypertrophy with MSK symptoms associated with breast size.  Has chronic thoracic back and shoulder pain.      No results found for this visit on 03/26/24.    Review of Systems   Constitutional:  Positive for fatigue. Negative for appetite change, chills, fever and unexpected weight change.   HENT:  Negative for hearing loss and nosebleeds.    Eyes:  Negative for photophobia and visual disturbance.   Respiratory:  Negative for chest tightness and shortness of breath.    Cardiovascular:  Negative for chest pain and leg swelling.   Gastrointestinal:  Negative for abdominal pain, blood in stool, diarrhea, nausea and vomiting.   Endocrine: Positive for polydipsia and polyuria. Negative for cold intolerance and heat  Bilobed Transposition Flap Text: The defect edges were debeveled with a #15 scalpel blade.  Given the location of the defect and the proximity to free margins a bilobed transposition flap was deemed most appropriate.  Using a sterile surgical marker, an appropriate bilobe flap drawn around the defect.    The area thus outlined was incised deep to adipose tissue with a #15 scalpel blade.  The skin margins were undermined to an appropriate distance in all directions utilizing iris scissors.

## 2024-03-27 ENCOUNTER — HOSPITAL ENCOUNTER (OUTPATIENT)
Dept: ULTRASOUND IMAGING | Age: 44
Discharge: HOME | End: 2024-03-29
Attending: OBSTETRICS & GYNECOLOGY
Payer: MEDICAID

## 2024-03-27 DIAGNOSIS — N83.209 CYST OF OVARY, UNSPECIFIED LATERALITY: ICD-10-CM

## 2024-03-27 PROCEDURE — 76856 US EXAM PELVIC COMPLETE: CPT

## 2024-03-27 PROCEDURE — 93975 VASCULAR STUDY: CPT

## 2024-03-27 PROCEDURE — 76830 TRANSVAGINAL US NON-OB: CPT

## 2024-03-31 ENCOUNTER — HOSPITAL ENCOUNTER (EMERGENCY)
Age: 44
Discharge: HOME | End: 2024-03-31
Payer: MEDICAID

## 2024-03-31 ENCOUNTER — APPOINTMENT (OUTPATIENT)
Dept: GENERAL RADIOLOGY | Age: 44
End: 2024-03-31
Payer: MEDICAID

## 2024-03-31 ENCOUNTER — APPOINTMENT (OUTPATIENT)
Dept: CT IMAGING | Age: 44
End: 2024-03-31
Payer: MEDICAID

## 2024-03-31 VITALS
DIASTOLIC BLOOD PRESSURE: 68 MMHG | HEART RATE: 88 BPM | HEIGHT: 59 IN | OXYGEN SATURATION: 97 % | RESPIRATION RATE: 18 BRPM | SYSTOLIC BLOOD PRESSURE: 143 MMHG | BODY MASS INDEX: 52.41 KG/M2 | WEIGHT: 260 LBS | TEMPERATURE: 98.2 F

## 2024-03-31 DIAGNOSIS — S09.90XA CLOSED HEAD INJURY, INITIAL ENCOUNTER: ICD-10-CM
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
## 2024-03-31 DIAGNOSIS — S00.03XA SCALP HEMATOMA, INITIAL ENCOUNTER: ICD-10-CM
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
## 2024-03-31 DIAGNOSIS — S00.81XA FACIAL ABRASION, INITIAL ENCOUNTER: ICD-10-CM

## 2024-03-31 PROCEDURE — 70450 CT HEAD/BRAIN W/O DYE: CPT

## 2024-03-31 PROCEDURE — 72125 CT NECK SPINE W/O DYE: CPT

## 2024-03-31 PROCEDURE — 99284 EMERGENCY DEPT VISIT MOD MDM: CPT

## 2024-03-31 PROCEDURE — 70486 CT MAXILLOFACIAL W/O DYE: CPT

## 2024-03-31 PROCEDURE — 72170 X-RAY EXAM OF PELVIS: CPT

## 2024-03-31 ASSESSMENT — LIFESTYLE VARIABLES
HOW MANY STANDARD DRINKS CONTAINING ALCOHOL DO YOU HAVE ON A TYPICAL DAY: 1 OR 2
HOW OFTEN DO YOU HAVE A DRINK CONTAINING ALCOHOL: NEVER

## 2024-03-31 ASSESSMENT — PAIN - FUNCTIONAL ASSESSMENT: PAIN_FUNCTIONAL_ASSESSMENT: 0-10

## 2024-03-31 ASSESSMENT — PAIN SCALES - GENERAL: PAINLEVEL_OUTOF10: 9

## 2024-03-31 ASSESSMENT — PAIN DESCRIPTION - LOCATION: LOCATION: HEAD

## 2024-03-31 NOTE — ED PROVIDER NOTES
in the Last Year: No       SCREENINGS         [place] Coma Scale  Eye Opening: Spontaneous  Best Verbal Response: Oriented  Best Motor Response: Obeys commands  [place] Coma Scale Score: 15                     WA Assessment  BP: (!) 143/68  Pulse: 88                 PHYSICAL EXAM    (up to 7 for level 4, 8 or more for level 5)     ED Triage Vitals [03/31/24 0830]   BP Temp Temp Source Pulse Respirations SpO2 Height Weight - Scale   -- 98.2 °F (36.8 °C) Oral 100 18 96 % 1.499 m (4' 11\") 117.9 kg (260 lb)       Physical Exam  Vitals and nursing note reviewed.   Constitutional:       General: She is not in acute distress.     Appearance: She is well-developed. She is not ill-appearing, toxic-appearing or diaphoretic.   HENT:      Head: Normocephalic.        Comments: Patient has a hematoma noted to left parietal region, no depressions, no deformity, there is also a approximately 4 cm superficial laceration, scratch to left cheek, no facial pain, deformity, depressions, no bleeding.     Nose: No congestion.      Mouth/Throat:      Mouth: Mucous membranes are moist.      Pharynx: No oropharyngeal exudate or posterior oropharyngeal erythema.   Eyes:      Extraocular Movements: Extraocular movements intact.      Conjunctiva/sclera: Conjunctivae normal.      Pupils: Pupils are equal, round, and reactive to light.      Comments: Pupils are equal, round reactive to light, at 3 mm, no nystagmus.   Neck:      Vascular: No JVD.      Trachea: No tracheal deviation.      Comments: There is no midline tenderness, no step-off, no crepitus, no deformity, full range of motion with pain per patient in the right SCM region.  No cut scrapes abrasions or bruising is noted, no ecchymosis.  Cardiovascular:      Rate and Rhythm: Normal rate.      Pulses: Normal pulses.      Heart sounds: Normal heart sounds. No murmur heard.     No friction rub. No gallop.   Pulmonary:      Effort: Pulmonary effort is normal. No tachypnea, accessory muscle
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
## 2024-03-31 NOTE — ED NOTES
Patient educated on discharge instructions, rx instructions and follow-up. Patient verbalizes understanding and denies questions/needs

## 2024-04-02 NOTE — PROGRESS NOTES
Ramila Perez is a 43 y.o. female who presents here today for complaints of Follow-up (Seen at [facility] ED 24. CT done. )    Hx ovarian cyst . C/o of pelvic pain , asking for follow up US to check . Patient also complaining of urgency, frequency, incontinence, nocturia. 3-4 times at night . Every 1-2 hrs during the day . Symptoms getting worse over past 6 mnth . Has tried oxybutynin xl 10 mg , mentions helped but \" not completely\" . Had advised to use vaginal estrogen cream on prior visits , but patient mentions \" it is messy \" .   .      Vitals:  /86 (Site: Left Upper Arm, Position: Sitting, Cuff Size: Medium Adult)   Pulse 76   Ht 1.549 m (5' 1\")   Wt 107.5 kg (237 lb)   LMP 2020   BMI 44.78 kg/m²   Allergies:  Metronidazole and Percocet [oxycodone-acetaminophen]  Past Medical History:   Diagnosis Date    Abnormal Pap smear of cervix     Breast disorder     Diabetes mellitus, new onset (HCC) 3/26/2024    Fibroid     Hypertension      Past Surgical History:   Procedure Laterality Date    FOOT SURGERY      cyst on right foot as a child    HYSTERECTOMY (CERVIX STATUS UNKNOWN)  2021    HYSTERECTOMY, VAGINAL N/A 2021    LAPAROSCOPIC HYSTERECTOMY, BILATERAL SALPINGECTOMY, LAPAROTOMY ASSISTED MYOMECTOMY performed by She Casas MD at [facility] [place]    MYOMECTOMY      UTERINE FIBROID EMBOLIZATION  2020    Dr Darrion Mcrae [phone]     OB History          0    Para   0    Term   0       0    AB   0    Living   0         SAB   0    IAB   0    Ectopic   0    Molar   0    Multiple   0    Live Births   0              Family History   Problem Relation Age of Onset    Diabetes Paternal Grandmother     Diabetes Father     Diabetes Paternal Aunt     Diabetes Paternal Uncle     Breast Cancer Neg Hx     Cancer Neg Hx     Colon Cancer Neg Hx     Eclampsia Neg Hx     Ovarian Cancer Neg Hx     Hypertension Neg Hx      Labor Neg Hx     Spont Abortions Neg Hx

## 2024-04-03 ENCOUNTER — OFFICE VISIT (OUTPATIENT)
Dept: OBGYN CLINIC | Age: 44
End: 2024-04-03
Payer: MEDICAID

## 2024-04-03 VITALS
DIASTOLIC BLOOD PRESSURE: 86 MMHG | HEIGHT: 59 IN | HEART RATE: 76 BPM | WEIGHT: 237 LBS | BODY MASS INDEX: 47.78 KG/M2 | SYSTOLIC BLOOD PRESSURE: 136 MMHG

## 2024-04-03 DIAGNOSIS — N39.41 URGE INCONTINENCE: Primary | ICD-10-CM

## 2024-04-03 DIAGNOSIS — N30.10 IC (INTERSTITIAL CYSTITIS): ICD-10-CM

## 2024-04-03 DIAGNOSIS — N83.209 CYST OF OVARY, UNSPECIFIED LATERALITY: ICD-10-CM

## 2024-04-03 PROCEDURE — 99213 OFFICE O/P EST LOW 20 MIN: CPT | Performed by: OBSTETRICS & GYNECOLOGY

## 2024-04-03 RX ORDER — AMITRIPTYLINE HYDROCHLORIDE 25 MG/1
25 TABLET, FILM COATED ORAL NIGHTLY
Qty: 30 TABLET | Refills: 1 | Status: SHIPPED | OUTPATIENT
Start: 2024-04-03

## 2024-04-03 RX ORDER — FESOTERODINE FUMARATE 8 MG/1
1 TABLET, EXTENDED RELEASE ORAL DAILY
Qty: 30 TABLET | Refills: 0 | Status: SHIPPED | OUTPATIENT
Start: 2024-04-03

## 2024-04-03 NOTE — PROGRESS NOTES
Results Review      Ramila Perez is a 43 y.o. year old female here to discuss US results.   PREVIOUS VISIT: pelvic pain with history of ovarian cysts .     Vitals:  /86   Pulse 76   Ht 1.499 m (4' 11\")   Wt 107.5 kg (237 lb)   LMP 2020   BMI 47.87 kg/m²   Allergies:  Metronidazole and Percocet [oxycodone-acetaminophen]  Past Medical History:   Diagnosis Date    Abnormal Pap smear of cervix     Breast disorder     Diabetes mellitus, new onset (HCC) 3/26/2024    Fibroid     Hypertension      Past Surgical History:   Procedure Laterality Date    FOOT SURGERY      cyst on right foot as a child    HYSTERECTOMY (CERVIX STATUS UNKNOWN)  2021    HYSTERECTOMY, VAGINAL N/A 2021    LAPAROSCOPIC HYSTERECTOMY, BILATERAL SALPINGECTOMY, LAPAROTOMY ASSISTED MYOMECTOMY performed by She Casas MD at [facility] OR    MYOMECTOMY      UTERINE FIBROID EMBOLIZATION  2020    Dr Darrion Mcrae [phone]     OB History          0    Para   0    Term   0       0    AB   0    Living   0         SAB   0    IAB   0    Ectopic   0    Molar   0    Multiple   0    Live Births   0                 Family History   Problem Relation Age of Onset    Diabetes Paternal Grandmother     Diabetes Father     Diabetes Paternal Aunt     Diabetes Paternal Uncle     Breast Cancer Neg Hx     Cancer Neg Hx     Colon Cancer Neg Hx     Eclampsia Neg Hx     Ovarian Cancer Neg Hx     Hypertension Neg Hx      Labor Neg Hx     Spont Abortions Neg Hx     Stroke Neg Hx      Social History     Socioeconomic History    Marital status: Single     Spouse name: Not on file    Number of children: Not on file    Years of education: Not on file    Highest education level: Not on file   Occupational History    Not on file   Tobacco Use    Smoking status: Former     Current packs/day: 0.00     Types: Cigarettes     Quit date: 2012     Years since quittin.6    Smokeless tobacco: Never   Vaping Use    Vaping

## 2024-04-10 ENCOUNTER — TELEPHONE (OUTPATIENT)
Dept: OBGYN CLINIC | Age: 44
End: 2024-04-10

## 2024-04-10 ENCOUNTER — HOSPITAL ENCOUNTER (OUTPATIENT)
Dept: DIABETES SERVICES | Age: 44
Setting detail: THERAPIES SERIES
Discharge: HOME | End: 2024-04-04

## 2024-04-10 NOTE — TELEPHONE ENCOUNTER
Prior auth done for Toviaz/fesoterodine. PA was denied by insurance. Per PA response, she needs a 30 day trial and failure of at least 2 preferred medications. Preferred medications include Myrbetriq, Oxybutynin, Solifenacin, and Toviaz and documentation must be provided why the preferred Brand product (Toviaz) can't be used. It also states pharmacy claims history does not show a documented trial of Oxybutynin. PA resent to [facility] for branded medication.

## 2024-04-17 ENCOUNTER — HOSPITAL ENCOUNTER (OUTPATIENT)
Dept: DIABETES SERVICES | Age: 44
Setting detail: THERAPIES SERIES
Discharge: HOME | End: 2024-04-17

## 2024-04-24 RX ORDER — SODIUM CHLORIDE 0.9 % (FLUSH) 0.9 %
5-40 SYRINGE (ML) INJECTION EVERY 12 HOURS SCHEDULED
Status: CANCELLED | OUTPATIENT
Start: 2024-04-24

## 2024-04-24 RX ORDER — SODIUM CHLORIDE 9 MG/ML
INJECTION, SOLUTION INTRAVENOUS CONTINUOUS
Status: CANCELLED | OUTPATIENT
Start: 2024-04-24

## 2024-04-24 RX ORDER — SODIUM CHLORIDE 0.9 % (FLUSH) 0.9 %
5-40 SYRINGE (ML) INJECTION PRN
Status: CANCELLED | OUTPATIENT
Start: 2024-04-24

## 2024-04-24 RX ORDER — SODIUM CHLORIDE 9 MG/ML
INJECTION, SOLUTION INTRAVENOUS PRN
Status: CANCELLED | OUTPATIENT
Start: 2024-04-24

## 2024-05-15 ENCOUNTER — OFFICE VISIT (OUTPATIENT)
Dept: OBGYN CLINIC | Age: 44
End: 2024-05-15
Payer: MEDICAID

## 2024-05-15 VITALS
DIASTOLIC BLOOD PRESSURE: 84 MMHG | HEART RATE: 80 BPM | SYSTOLIC BLOOD PRESSURE: 128 MMHG | BODY MASS INDEX: 48.18 KG/M2 | WEIGHT: 239 LBS | HEIGHT: 59 IN

## 2024-05-15 DIAGNOSIS — N39.41 URGE INCONTINENCE: Primary | ICD-10-CM

## 2024-05-15 DIAGNOSIS — N30.10 IC (INTERSTITIAL CYSTITIS): ICD-10-CM

## 2024-05-15 PROCEDURE — 99213 OFFICE O/P EST LOW 20 MIN: CPT | Performed by: OBSTETRICS & GYNECOLOGY

## 2024-05-15 RX ORDER — SOLIFENACIN SUCCINATE 10 MG/1
10 TABLET, FILM COATED ORAL DAILY
Qty: 15 TABLET | Refills: 0 | Status: SHIPPED | OUTPATIENT
Start: 2024-05-15 | End: 2024-05-29

## 2024-05-15 RX ORDER — DULOXETIN HYDROCHLORIDE 30 MG/1
30 CAPSULE, DELAYED RELEASE ORAL DAILY
Qty: 30 CAPSULE | Refills: 0 | Status: SHIPPED | OUTPATIENT
Start: 2024-05-15

## 2024-05-15 NOTE — PROGRESS NOTES
Ramila Perez is a 43 y.o. female who presents here today for complaints of Follow-up (Amitriptyline, Toviaz.)    Patient mentions that meds helped but side effects not tolerable , dry mouth and possible abdominal pain with the Toviaz.   .the amytriptiline helped with the pain, but not completely with morning tiredness.  Patient does mention that her symptoms in general have improved.      Vitals:  /84 (Site: Right Upper Arm, Position: Sitting, Cuff Size: Medium Adult)   Pulse 80   Ht 1.499 m (4' 11\")   Wt 108.4 kg (239 lb)   LMP 2020   BMI 48.27 kg/m²   Allergies:  Metronidazole and Percocet [oxycodone-acetaminophen]  Past Medical History:   Diagnosis Date    Abnormal Pap smear of cervix     Breast disorder     Diabetes mellitus, new onset (HCC) 3/26/2024    Fibroid     Hypertension      Past Surgical History:   Procedure Laterality Date    FOOT SURGERY      cyst on right foot as a child    HYSTERECTOMY (CERVIX STATUS UNKNOWN)  2021    HYSTERECTOMY, VAGINAL N/A 2021    LAPAROSCOPIC HYSTERECTOMY, BILATERAL SALPINGECTOMY, LAPAROTOMY ASSISTED MYOMECTOMY performed by She Casas MD at [facility] [place]    MYOMECTOMY      UTERINE FIBROID EMBOLIZATION  2020    Dr Darrion Mcrae [phone]     OB History          0    Para   0    Term   0       0    AB   0    Living   0         SAB   0    IAB   0    Ectopic   0    Molar   0    Multiple   0    Live Births   0              Family History   Problem Relation Age of Onset    Diabetes Paternal Grandmother     Diabetes Father     Diabetes Paternal Aunt     Diabetes Paternal Uncle     Breast Cancer Neg Hx     Cancer Neg Hx     Colon Cancer Neg Hx     Eclampsia Neg Hx     Ovarian Cancer Neg Hx     Hypertension Neg Hx      Labor Neg Hx     Spont Abortions Neg Hx     Stroke Neg Hx      Social History     Socioeconomic History    Marital status: Single     Spouse name: Not on file    Number of children: Not on file

## 2024-05-20 ENCOUNTER — PREP FOR PROCEDURE (OUTPATIENT)
Dept: BARIATRICS/WEIGHT MGMT | Age: 44
End: 2024-05-20

## 2024-05-21 RX ORDER — SODIUM CHLORIDE 9 MG/ML
INJECTION, SOLUTION INTRAVENOUS CONTINUOUS
Status: CANCELLED | OUTPATIENT
Start: 2024-05-21

## 2024-05-21 RX ORDER — SODIUM CHLORIDE 9 MG/ML
INJECTION, SOLUTION INTRAVENOUS PRN
Status: CANCELLED | OUTPATIENT
Start: 2024-05-21

## 2024-05-21 RX ORDER — SODIUM CHLORIDE 0.9 % (FLUSH) 0.9 %
5-40 SYRINGE (ML) INJECTION EVERY 12 HOURS SCHEDULED
Status: CANCELLED | OUTPATIENT
Start: 2024-05-21

## 2024-05-21 RX ORDER — SODIUM CHLORIDE 0.9 % (FLUSH) 0.9 %
5-40 SYRINGE (ML) INJECTION PRN
Status: CANCELLED | OUTPATIENT
Start: 2024-05-21

## 2024-05-22 RX ORDER — FESOTERODINE FUMARATE 8 MG/1
1 TABLET, FILM COATED, EXTENDED RELEASE ORAL DAILY
Qty: 30 TABLET | Refills: 5 | Status: SHIPPED | OUTPATIENT
Start: 2024-05-22

## 2024-06-28 RX ORDER — DULOXETIN HYDROCHLORIDE 30 MG/1
30 CAPSULE, DELAYED RELEASE ORAL DAILY
Qty: 30 CAPSULE | Refills: 5 | Status: SHIPPED | OUTPATIENT
Start: 2024-06-28

## 2024-07-08 ENCOUNTER — OFFICE VISIT (OUTPATIENT)
Dept: FAMILY MEDICINE CLINIC | Age: 44
End: 2024-07-08
Payer: MEDICAID

## 2024-07-08 VITALS
SYSTOLIC BLOOD PRESSURE: 122 MMHG | BODY MASS INDEX: 48.18 KG/M2 | HEIGHT: 59 IN | WEIGHT: 239 LBS | DIASTOLIC BLOOD PRESSURE: 82 MMHG | OXYGEN SATURATION: 94 % | HEART RATE: 72 BPM

## 2024-07-08 DIAGNOSIS — E11.9 DIABETES MELLITUS, NEW ONSET (HCC): ICD-10-CM

## 2024-07-08 DIAGNOSIS — R25.2 LEG CRAMPS: ICD-10-CM

## 2024-07-08 DIAGNOSIS — Z90.710 S/P HYSTERECTOMY: ICD-10-CM

## 2024-07-08 DIAGNOSIS — E11.9 DIABETES MELLITUS, NEW ONSET (HCC): Primary | ICD-10-CM

## 2024-07-08 DIAGNOSIS — N89.8 VAGINAL DRYNESS: ICD-10-CM

## 2024-07-08 DIAGNOSIS — E66.01 MORBID OBESITY (HCC): ICD-10-CM

## 2024-07-08 PROBLEM — R10.2 PELVIC PAIN: Status: RESOLVED | Noted: 2024-03-26 | Resolved: 2024-07-08

## 2024-07-08 LAB
CHOLEST SERPL-MCNC: 190 MG/DL (ref 0–199)
CREAT UR-MCNC: 147.6 MG/DL
ESTIMATED AVERAGE GLUCOSE: 154 MG/DL
HBA1C MFR BLD: 7 % (ref 4–6)
HDLC SERPL-MCNC: 42 MG/DL (ref 40–59)
LDLC SERPL CALC-MCNC: 115 MG/DL (ref 0–129)
MAGNESIUM SERPL-MCNC: 1.9 MG/DL (ref 1.7–2.4)
MICROALBUMIN UR-MCNC: <1.2 MG/DL
MICROALBUMIN/CREAT UR-RTO: NORMAL MG/G (ref 0–30)
TRIGL SERPL-MCNC: 164 MG/DL (ref 0–150)

## 2024-07-08 PROCEDURE — 99214 OFFICE O/P EST MOD 30 MIN: CPT | Performed by: PHYSICIAN ASSISTANT

## 2024-07-08 PROCEDURE — 3044F HG A1C LEVEL LT 7.0%: CPT | Performed by: PHYSICIAN ASSISTANT

## 2024-07-08 RX ORDER — IBUPROFEN 800 MG/1
800 TABLET ORAL 2 TIMES DAILY PRN
Qty: 60 TABLET | Refills: 0 | Status: SHIPPED | OUTPATIENT
Start: 2024-07-08

## 2024-07-08 RX ORDER — MAGNESIUM 200 MG
200 TABLET ORAL NIGHTLY
Qty: 30 TABLET | Refills: 5 | Status: SHIPPED | OUTPATIENT
Start: 2024-07-08

## 2024-07-08 RX ORDER — METFORMIN HYDROCHLORIDE 500 MG/1
500 TABLET, EXTENDED RELEASE ORAL
Qty: 90 TABLET | Refills: 2 | Status: SHIPPED | OUTPATIENT
Start: 2024-07-08

## 2024-07-08 ASSESSMENT — ENCOUNTER SYMPTOMS
SHORTNESS OF BREATH: 0
CHEST TIGHTNESS: 0
BLOOD IN STOOL: 0
VOMITING: 0
ABDOMINAL PAIN: 0
PHOTOPHOBIA: 0
NAUSEA: 0
DIARRHEA: 0

## 2024-07-08 NOTE — PROGRESS NOTES
Adult   Pulse: 72   SpO2: 94%   Weight: 108.4 kg (239 lb)   Height: 1.499 m (4' 11\")     BP Readings from Last 3 Encounters:   07/08/24 122/82   05/15/24 128/84   04/03/24 136/86     Wt Readings from Last 3 Encounters:   07/08/24 108.4 kg (239 lb)   05/15/24 108.4 kg (239 lb)   04/03/24 107.5 kg (237 lb)     Physical Exam  Vitals reviewed.   Constitutional:       Appearance: She is obese.   HENT:      Head: Normocephalic and atraumatic.      Right Ear: External ear normal.      Left Ear: External ear normal.      Mouth/Throat:      Mouth: Mucous membranes are moist.   Eyes:      Conjunctiva/sclera: Conjunctivae normal.   Cardiovascular:      Rate and Rhythm: Normal rate and regular rhythm.   Pulmonary:      Effort: Pulmonary effort is normal.      Breath sounds: Normal breath sounds.   Skin:     General: Skin is warm and dry.   Neurological:      General: No focal deficit present.      Mental Status: She is alert and oriented to person, place, and time.   Psychiatric:         Mood and Affect: Mood normal.         Behavior: Behavior normal.         Thought Content: Thought content normal.         Judgment: Judgment normal.       Assessment & Plan    Diagnosis Orders   1. Diabetes mellitus, new onset (HCC)  Lipid Panel    Microalbumin / Creatinine Urine Ratio    metFORMIN (GLUCOPHAGE-XR) 500 MG extended release tablet    Hemoglobin A1C      2. S/P hysterectomy  Ambulatory referral to Family Practice      3. Vaginal dryness  Ambulatory referral to Family Practice      4. Leg cramps  Magnesium    magnesium 200 MG TABS tablet      5. Morbid obesity (HCC)        Labs for montioring.   Referral to Dr. Florez for HRT review.    Trial mag supplement.   6-8 week follow up with me.       Orders Placed This Encounter   Procedures    Lipid Panel     Standing Status:   Future     Number of Occurrences:   1     Standing Expiration Date:   7/8/2025     Order Specific Question:   Is Patient Fasting?/# of Hours     Answer:   y/12

## 2024-07-10 RX ORDER — SODIUM CHLORIDE 9 MG/ML
INJECTION, SOLUTION INTRAVENOUS CONTINUOUS
OUTPATIENT
Start: 2024-07-10

## 2024-07-10 RX ORDER — SODIUM CHLORIDE 0.9 % (FLUSH) 0.9 %
5-40 SYRINGE (ML) INJECTION PRN
OUTPATIENT
Start: 2024-07-10

## 2024-07-10 RX ORDER — SODIUM CHLORIDE 0.9 % (FLUSH) 0.9 %
5-40 SYRINGE (ML) INJECTION EVERY 12 HOURS SCHEDULED
OUTPATIENT
Start: 2024-07-10

## 2024-07-10 RX ORDER — SODIUM CHLORIDE 9 MG/ML
INJECTION, SOLUTION INTRAVENOUS PRN
OUTPATIENT
Start: 2024-07-10

## 2024-08-26 ENCOUNTER — PREP FOR PROCEDURE (OUTPATIENT)
Dept: BARIATRICS/WEIGHT MGMT | Age: 44
End: 2024-08-26

## 2024-09-30 ENCOUNTER — OFFICE VISIT (OUTPATIENT)
Dept: BARIATRICS/WEIGHT MGMT | Age: 44
End: 2024-09-30
Payer: MEDICAID

## 2024-09-30 VITALS — WEIGHT: 245.4 LBS | BODY MASS INDEX: 48.18 KG/M2 | HEIGHT: 60 IN

## 2024-09-30 DIAGNOSIS — E11.9 DIABETES MELLITUS, NEW ONSET (HCC): Primary | ICD-10-CM

## 2024-09-30 DIAGNOSIS — E66.01 MORBID OBESITY: ICD-10-CM

## 2024-09-30 DIAGNOSIS — Z71.3 DIETARY COUNSELING AND SURVEILLANCE: ICD-10-CM

## 2024-09-30 PROCEDURE — 3051F HG A1C>EQUAL 7.0%<8.0%: CPT | Performed by: DIETITIAN, REGISTERED

## 2024-09-30 PROCEDURE — 97802 MEDICAL NUTRITION INDIV IN: CPT | Performed by: DIETITIAN, REGISTERED

## 2024-09-30 NOTE — PROGRESS NOTES
[facility]  Initial Nutrition Consultation    Patient is a 44 y.o. female seen for initial Medical Nutrition Therapy visit for  Apoorva en Y gastric bypass.   Visit number:  1 out of 6    9/30/2024  Height:   Ht Readings from Last 1 Encounters:   07/08/24 1.499 m (4' 11\")     Weight:   Wt Readings from Last 1 Encounters:   07/08/24 108.4 kg (239 lb)     BMI:   BMI Readings from Last 1 Encounters:   07/08/24 48.27 kg/m²     EBW: 117 lbs  Preoperative weight loss goal: 12 lbs (5%)    Weight History:   Wt Readings from Last 3 Encounters:   07/08/24 108.4 kg (239 lb)   05/15/24 108.4 kg (239 lb)   04/03/24 107.5 kg (237 lb)       Patient's lowest adult weight was 150 lbs at age 21s.      Patient's highest adult weight was 245 lbs at age 44.      Patient has participated in the following weight loss programs self directed low calorie diet  Patient has not participated in meal replacement/liquid diets.    Patient has not participated in weight loss medications.    Patient is  lactose intolerant.  Patient does not have Mormonism/cultural food concerns.  Patient does not have food allergies.    Patient dines out to a sit down restaurant 0 times per week.  Patient has fast food or picks up carry out 1 times per week.     Grocery Shopping in household done by: patient  Cooking in household done by: patient    24 hour recall/food frequency chart:  Breakfast:  skips or cereal on work days, off days skips or [place] and pancakes  Snack:  none  Lunch: works at a shelter, eats whatever cook makes  Snack:  chips  Dinner: meal at shelter or fried/baked chicken at home and starch, low intake of vegetables  Snack:  chips  Drinks throughout the day: 2 cans of regular soda/day, 48 oz water/day  Vitamin/Mineral supplementation: none    Do you drink alcohol? Yes, 1/2 pint of  \"whatever\" 1-2 times/week    Do you smoke? No    Patient does not meet the criteria for binge eating disorder. Patient does not have

## 2024-10-01 ENCOUNTER — OFFICE VISIT (OUTPATIENT)
Dept: FAMILY MEDICINE CLINIC | Age: 44
End: 2024-10-01
Payer: MEDICAID

## 2024-10-01 VITALS
HEIGHT: 60 IN | WEIGHT: 246 LBS | TEMPERATURE: 98.1 F | SYSTOLIC BLOOD PRESSURE: 136 MMHG | BODY MASS INDEX: 48.29 KG/M2 | DIASTOLIC BLOOD PRESSURE: 84 MMHG | OXYGEN SATURATION: 99 % | HEART RATE: 80 BPM

## 2024-10-01 DIAGNOSIS — N95.8 GENITOURINARY SYNDROME OF MENOPAUSE: Primary | ICD-10-CM

## 2024-10-01 PROCEDURE — 99214 OFFICE O/P EST MOD 30 MIN: CPT | Performed by: STUDENT IN AN ORGANIZED HEALTH CARE EDUCATION/TRAINING PROGRAM

## 2024-10-01 RX ORDER — ESTRADIOL 2 MG/1
SYSTEM VAGINAL
Qty: 1 EACH | Refills: 1 | Status: SHIPPED | OUTPATIENT
Start: 2024-10-01

## 2024-10-01 RX ORDER — FLUOCINOLONE ACETONIDE 0.11 MG/ML
OIL TOPICAL
COMMUNITY
Start: 2024-08-01

## 2024-10-02 ASSESSMENT — ENCOUNTER SYMPTOMS
SHORTNESS OF BREATH: 0
NAUSEA: 0
RHINORRHEA: 0
WHEEZING: 0
VOMITING: 0
COUGH: 0
ABDOMINAL PAIN: 0
EYE DISCHARGE: 0
DIARRHEA: 0
SORE THROAT: 0

## 2024-10-16 ENCOUNTER — TELEPHONE (OUTPATIENT)
Dept: FAMILY MEDICINE CLINIC | Age: 44
End: 2024-10-16

## 2024-10-16 NOTE — TELEPHONE ENCOUNTER
----- Message from Tatyana LUNDBERG sent at 10/14/2024  9:28 AM EDT -----  Regarding: ECC Appointment Request  ECC Appointment Request    Patient needs appointment for ECC Appointment Type: Existing Condition Follow Up.    Patient Requested Dates(s): Earliest cancellation available appointment   Patient Requested Time: Morning   Provider Name: Radha Florez DO    Reason for Appointment Request: Other: Patient is requesting to fit in for any cancellation available for the appointment if there's any if possible.   --------------------------------------------------------------------------------------------------------------------------    Relationship to Patient: Self     Call Back Information: OK to leave message on voicemail  Preferred Call Back Number: Phone:

## 2024-10-30 ENCOUNTER — TELEPHONE (OUTPATIENT)
Dept: FAMILY MEDICINE CLINIC | Age: 44
End: 2024-10-30

## 2024-10-30 NOTE — TELEPHONE ENCOUNTER
----- Message from Uday GARCIA sent at 10/30/2024  8:08 AM EDT -----  Regarding: ECC Appointment Request  ECC Appointment Request    Patient needs appointment for ECC Appointment Type: Existing Condition Follow Up.    Patient Requested Dates(s): sooner than November 15, 2024  Patient Requested Time: morning  Provider Name: Radha Florez    Reason for Appointment Request: Established Patient - Available appointments did not meet patient need; patient needs to reschedule the appointment on 11-  --------------------------------------------------------------------------------------------------------------------------    Relationship to Patient: Self     Call Back Information: OK to leave message on voicemail  Preferred Call Back Number: Phone : [phone]

## 2024-10-30 NOTE — TELEPHONE ENCOUNTER
----- Message from Uday GARCIA sent at 10/30/2024  8:08 AM EDT -----  Regarding: ECC Appointment Request  ECC Appointment Request    Patient needs appointment for ECC Appointment Type: Existing Condition Follow Up.    Patient Requested Dates(s): sooner than November 15, 2024  Patient Requested Time: morning  Provider Name: Radha Florez    Reason for Appointment Request: Established Patient - Available appointments did not meet patient need; patient needs to reschedule the appointment on 11-  --------------------------------------------------------------------------------------------------------------------------    Relationship to Patient: Self     Call Back Information: OK to leave message on voicemail  Preferred Call Back Number: Phone : [phone]

## 2024-11-05 ENCOUNTER — OFFICE VISIT (OUTPATIENT)
Dept: FAMILY MEDICINE CLINIC | Age: 44
End: 2024-11-05
Payer: MEDICAID

## 2024-11-05 VITALS
HEART RATE: 88 BPM | BODY MASS INDEX: 48.69 KG/M2 | DIASTOLIC BLOOD PRESSURE: 100 MMHG | SYSTOLIC BLOOD PRESSURE: 160 MMHG | OXYGEN SATURATION: 96 % | WEIGHT: 248 LBS | HEIGHT: 60 IN | TEMPERATURE: 98.8 F

## 2024-11-05 DIAGNOSIS — R03.0 ELEVATED BLOOD PRESSURE READING: ICD-10-CM

## 2024-11-05 DIAGNOSIS — N95.8 GENITOURINARY SYNDROME OF MENOPAUSE: Primary | ICD-10-CM

## 2024-11-05 DIAGNOSIS — Z12.31 SCREENING MAMMOGRAM, ENCOUNTER FOR: ICD-10-CM

## 2024-11-05 PROCEDURE — 99214 OFFICE O/P EST MOD 30 MIN: CPT | Performed by: STUDENT IN AN ORGANIZED HEALTH CARE EDUCATION/TRAINING PROGRAM

## 2024-11-05 RX ORDER — ESTRADIOL 0.1 MG/G
1 CREAM VAGINAL SEE ADMIN INSTRUCTIONS
Qty: 42.5 G | Refills: 2 | Status: SHIPPED | OUTPATIENT
Start: 2024-11-05

## 2024-11-05 NOTE — PROGRESS NOTES
of the medication prescribed today, as well as treatment plan/ rationale and result expectations have been discussed with the patient who expresses understanding and desires to proceed.    Close follow up to evaluate treatment results and for coordination of care.  I have reviewed the patient's medical history in detail and updated the computerized patient record.    Please note, this report has been partially produced using speech recognition software and may cause  and /or contain errors related to that system including grammar, punctuation and spelling as well as words and phrases that may seem inappropriate. If there are questions or concerns please feel free to contact me to clarify.    Radha Florez, DO

## 2024-11-06 ENCOUNTER — TELEPHONE (OUTPATIENT)
Dept: FAMILY MEDICINE CLINIC | Age: 44
End: 2024-11-06

## 2024-11-06 NOTE — TELEPHONE ENCOUNTER
Patient called in regards to the Estradiol vaginal cream she said the pharmacy is requesting a PA for the cream . She is would like to know if insurance will cover the cream or does she need to be prescribed an alternative prescription .

## 2024-11-11 ENCOUNTER — PREP FOR PROCEDURE (OUTPATIENT)
Dept: BARIATRICS/WEIGHT MGMT | Age: 44
End: 2024-11-11

## 2024-11-11 RX ORDER — SODIUM CHLORIDE 0.9 % (FLUSH) 0.9 %
5-40 SYRINGE (ML) INJECTION PRN
Status: CANCELLED | OUTPATIENT
Start: 2024-11-11

## 2024-11-11 RX ORDER — SODIUM CHLORIDE 9 MG/ML
INJECTION, SOLUTION INTRAVENOUS CONTINUOUS
Status: CANCELLED | OUTPATIENT
Start: 2024-11-11

## 2024-11-11 RX ORDER — SODIUM CHLORIDE 9 MG/ML
INJECTION, SOLUTION INTRAVENOUS PRN
Status: CANCELLED | OUTPATIENT
Start: 2024-11-11

## 2024-11-11 RX ORDER — SODIUM CHLORIDE 0.9 % (FLUSH) 0.9 %
5-40 SYRINGE (ML) INJECTION EVERY 12 HOURS SCHEDULED
Status: CANCELLED | OUTPATIENT
Start: 2024-11-11

## 2024-11-13 ENCOUNTER — OFFICE VISIT (OUTPATIENT)
Age: 44
End: 2024-11-13
Payer: MEDICAID

## 2024-11-13 VITALS
HEART RATE: 96 BPM | SYSTOLIC BLOOD PRESSURE: 170 MMHG | OXYGEN SATURATION: 97 % | BODY MASS INDEX: 48.69 KG/M2 | DIASTOLIC BLOOD PRESSURE: 102 MMHG | HEIGHT: 60 IN | RESPIRATION RATE: 16 BRPM | WEIGHT: 248 LBS

## 2024-11-13 DIAGNOSIS — E11.9 TYPE 2 DIABETES MELLITUS WITHOUT COMPLICATION, WITHOUT LONG-TERM CURRENT USE OF INSULIN (HCC): Primary | ICD-10-CM

## 2024-11-13 DIAGNOSIS — G25.81 RESTLESS LEG: ICD-10-CM

## 2024-11-13 DIAGNOSIS — G89.29 CHRONIC MIDLINE THORACIC BACK PAIN: ICD-10-CM

## 2024-11-13 DIAGNOSIS — I10 PRIMARY HYPERTENSION: ICD-10-CM

## 2024-11-13 DIAGNOSIS — N89.8 VAGINAL DRYNESS: ICD-10-CM

## 2024-11-13 DIAGNOSIS — G47.09 TROUBLE GETTING TO SLEEP: ICD-10-CM

## 2024-11-13 DIAGNOSIS — M54.6 CHRONIC MIDLINE THORACIC BACK PAIN: ICD-10-CM

## 2024-11-13 DIAGNOSIS — G47.10 EXCESSIVE SLEEPINESS: ICD-10-CM

## 2024-11-13 PROCEDURE — 3077F SYST BP >= 140 MM HG: CPT | Performed by: PHYSICIAN ASSISTANT

## 2024-11-13 PROCEDURE — 3051F HG A1C>EQUAL 7.0%<8.0%: CPT | Performed by: PHYSICIAN ASSISTANT

## 2024-11-13 PROCEDURE — 3080F DIAST BP >= 90 MM HG: CPT | Performed by: PHYSICIAN ASSISTANT

## 2024-11-13 PROCEDURE — 93000 ELECTROCARDIOGRAM COMPLETE: CPT | Performed by: PHYSICIAN ASSISTANT

## 2024-11-13 PROCEDURE — 99214 OFFICE O/P EST MOD 30 MIN: CPT | Performed by: PHYSICIAN ASSISTANT

## 2024-11-13 RX ORDER — AMLODIPINE BESYLATE 5 MG/1
5 TABLET ORAL DAILY
Qty: 30 TABLET | Refills: 5 | Status: SHIPPED | OUTPATIENT
Start: 2024-11-13

## 2024-11-13 RX ORDER — TRAZODONE HYDROCHLORIDE 50 MG/1
50 TABLET, FILM COATED ORAL NIGHTLY PRN
Qty: 30 TABLET | Refills: 5 | Status: SHIPPED | OUTPATIENT
Start: 2024-11-13

## 2024-11-13 RX ORDER — DULAGLUTIDE 0.75 MG/.5ML
0.75 INJECTION, SOLUTION SUBCUTANEOUS WEEKLY
Qty: 4 ADJUSTABLE DOSE PRE-FILLED PEN SYRINGE | Refills: 5 | Status: SHIPPED | OUTPATIENT
Start: 2024-11-13

## 2024-11-13 RX ORDER — METFORMIN HYDROCHLORIDE 500 MG/1
500 TABLET, EXTENDED RELEASE ORAL
Qty: 90 TABLET | Refills: 2 | Status: CANCELLED | OUTPATIENT
Start: 2024-11-13

## 2024-11-13 RX ORDER — PRAMIPEXOLE DIHYDROCHLORIDE 0.12 MG/1
0.12 TABLET ORAL NIGHTLY
Qty: 90 TABLET | Refills: 3 | Status: SHIPPED | OUTPATIENT
Start: 2024-11-13

## 2024-11-13 ASSESSMENT — ENCOUNTER SYMPTOMS
BACK PAIN: 1
CHEST TIGHTNESS: 0
VOMITING: 0
SHORTNESS OF BREATH: 1
PHOTOPHOBIA: 0
ABDOMINAL PAIN: 0
NAUSEA: 0
BLOOD IN STOOL: 0
DIARRHEA: 0

## 2024-11-13 NOTE — PROGRESS NOTES
Subjective  Ramila EMERY Latricia Perez, 44 y.o. female presents today with:  Chief Complaint   Patient presents with    Diabetes     Follow up     Blood Pressure Check     States her BP was high at Dr. Quiñonez office     Insomnia     Discuss starting medication to help her stay asleep        HPI  Last OV with me: 7/8/24    Has multiple concerns she would like to address.     Patient had a NP visit with Dr. Florez concerning vaginal dryness, genitourinary symptoms of menopause.  Noted to have elevated BP at OV.  Encouraged to follow up with me. Initially was sent vaginal ring; would prefer estradiol cream.      Noted to have elevated BP.  Past several OVs with other providers.    She is having elevated BP.  Feels irritable, HA, SOB.   Denies CP, dizziness.   Gets SOB with walking, not exercising.     Not sleeping well, waking up nonrefreshed.  Very sleepy.   Will drink alcohol to fall asleep, but then wakes up tired, groggy.    Has tried melatonin.   Asking for something else to try.    She has also noticed leg cramping, movement at night.  This will wake her up.   Didn't  the magnesium that was previously sent.     Continue to follow/pursue bariatric surgery.      Unable to tolerate metformin.  She had GI side effects despite ER tablet.  Had cramping, loose stool.  Would like to try alternate.     Review of Systems   Constitutional:  Positive for activity change and fatigue. Negative for appetite change, chills, fever and unexpected weight change.   HENT:  Negative for hearing loss and nosebleeds.    Eyes:  Negative for photophobia and visual disturbance.   Respiratory:  Positive for shortness of breath. Negative for chest tightness.    Cardiovascular:  Negative for chest pain, palpitations and leg swelling.   Gastrointestinal:  Negative for abdominal pain, blood in stool, diarrhea, nausea and vomiting.   Endocrine: Negative for cold intolerance, heat intolerance, polydipsia and polyuria.   Genitourinary:

## 2024-11-22 ENCOUNTER — TELEPHONE (OUTPATIENT)
Age: 44
End: 2024-11-22

## 2024-11-22 DIAGNOSIS — R35.0 URINARY FREQUENCY: Primary | ICD-10-CM

## 2024-11-22 NOTE — TELEPHONE ENCOUNTER
Patient states she is using the bathroom more than she should be and is asking if there is something Aida will prescribe her to help with an over active bladder.    Please Advise.    Callback [phone]

## 2024-11-24 NOTE — TELEPHONE ENCOUNTER
Recommend we check urine first to rule out possible UTI, if that is negative, yes we can start something.

## 2024-11-25 ENCOUNTER — NURSE ONLY (OUTPATIENT)
Age: 44
End: 2024-11-25

## 2024-11-25 DIAGNOSIS — R35.0 URINARY FREQUENCY: ICD-10-CM

## 2024-11-26 LAB — BACTERIA UR CULT: NORMAL

## 2024-11-27 ENCOUNTER — TELEPHONE (OUTPATIENT)
Age: 44
End: 2024-11-27

## 2024-11-27 NOTE — TELEPHONE ENCOUNTER
Pt called to get her recent urin lab results I relayed the message. She states she is still urinating frequently. She is concerned she may have an over active bladder. Please advise

## 2024-11-29 RX ORDER — OXYBUTYNIN CHLORIDE 5 MG/1
5 TABLET, EXTENDED RELEASE ORAL DAILY
Qty: 30 TABLET | Refills: 3 | Status: SHIPPED | OUTPATIENT
Start: 2024-11-29

## 2024-12-16 ENCOUNTER — TELEPHONE (OUTPATIENT)
Age: 44
End: 2024-12-16

## 2024-12-16 NOTE — TELEPHONE ENCOUNTER
----- Message from Ivette ARROYO sent at 12/16/2024  8:14 AM EST -----  Regarding: [facility] Appointment Request  [facility] Appointment Request    Patient needs appointment for [facility] Appointment Type: Existing Condition Follow Up.    Patient Requested Dates(s): any date  Patient Requested Time: morning  Provider Name: Aida Ramos PA-C      Reason for Appointment Request: Established Patient - Available appointments did not meet patient need  --------------------------------------------------------------------------------------------------------------------------    Relationship to Patient: Self     Call Back Information: OK to leave message on voicemail  Preferred Call Back Number: Phone [phone]    Note: 1 month follow up

## 2025-02-04 ENCOUNTER — APPOINTMENT (OUTPATIENT)
Dept: RADIOLOGY | Facility: HOSPITAL | Age: 45
End: 2025-02-04

## 2025-02-04 ENCOUNTER — HOSPITAL ENCOUNTER (EMERGENCY)
Facility: HOSPITAL | Age: 45
Discharge: HOME | End: 2025-02-04

## 2025-02-04 VITALS
BODY MASS INDEX: 52.41 KG/M2 | TEMPERATURE: 97.3 F | WEIGHT: 260 LBS | SYSTOLIC BLOOD PRESSURE: 151 MMHG | DIASTOLIC BLOOD PRESSURE: 78 MMHG | RESPIRATION RATE: 20 BRPM | OXYGEN SATURATION: 95 % | HEIGHT: 59 IN | HEART RATE: 70 BPM

## 2025-02-04 DIAGNOSIS — N83.202 CYST OF LEFT OVARY: Primary | ICD-10-CM

## 2025-02-04 LAB
ALBUMIN SERPL BCP-MCNC: 4 G/DL (ref 3.4–5)
ALP SERPL-CCNC: 48 U/L (ref 33–110)
ALT SERPL W P-5'-P-CCNC: 34 U/L (ref 7–45)
ANION GAP SERPL CALC-SCNC: 13 MMOL/L (ref 10–20)
APPEARANCE UR: CLEAR
AST SERPL W P-5'-P-CCNC: 30 U/L (ref 9–39)
BASOPHILS # BLD AUTO: 0.03 X10*3/UL (ref 0–0.1)
BASOPHILS NFR BLD AUTO: 0.5 %
BILIRUB SERPL-MCNC: 0.4 MG/DL (ref 0–1.2)
BILIRUB UR STRIP.AUTO-MCNC: NEGATIVE MG/DL
BUN SERPL-MCNC: 12 MG/DL (ref 6–23)
CALCIUM SERPL-MCNC: 9.2 MG/DL (ref 8.6–10.3)
CHLORIDE SERPL-SCNC: 109 MMOL/L (ref 98–107)
CO2 SERPL-SCNC: 21 MMOL/L (ref 21–32)
COLOR UR: ABNORMAL
CREAT SERPL-MCNC: 0.82 MG/DL (ref 0.5–1.05)
EGFRCR SERPLBLD CKD-EPI 2021: >90 ML/MIN/1.73M*2
EOSINOPHIL # BLD AUTO: 0.05 X10*3/UL (ref 0–0.7)
EOSINOPHIL NFR BLD AUTO: 0.8 %
ERYTHROCYTE [DISTWIDTH] IN BLOOD BY AUTOMATED COUNT: 13.6 % (ref 11.5–14.5)
GLUCOSE SERPL-MCNC: 129 MG/DL (ref 74–99)
GLUCOSE UR STRIP.AUTO-MCNC: ABNORMAL MG/DL
HCT VFR BLD AUTO: 38.2 % (ref 36–46)
HGB BLD-MCNC: 12.4 G/DL (ref 12–16)
IMM GRANULOCYTES # BLD AUTO: 0.05 X10*3/UL (ref 0–0.7)
IMM GRANULOCYTES NFR BLD AUTO: 0.8 % (ref 0–0.9)
KETONES UR STRIP.AUTO-MCNC: NEGATIVE MG/DL
LEUKOCYTE ESTERASE UR QL STRIP.AUTO: NEGATIVE
LIPASE SERPL-CCNC: 32 U/L (ref 9–82)
LYMPHOCYTES # BLD AUTO: 2.26 X10*3/UL (ref 1.2–4.8)
LYMPHOCYTES NFR BLD AUTO: 37.5 %
MCH RBC QN AUTO: 28.3 PG (ref 26–34)
MCHC RBC AUTO-ENTMCNC: 32.5 G/DL (ref 32–36)
MCV RBC AUTO: 87 FL (ref 80–100)
MONOCYTES # BLD AUTO: 0.51 X10*3/UL (ref 0.1–1)
MONOCYTES NFR BLD AUTO: 8.5 %
NEUTROPHILS # BLD AUTO: 3.13 X10*3/UL (ref 1.2–7.7)
NEUTROPHILS NFR BLD AUTO: 51.9 %
NITRITE UR QL STRIP.AUTO: NEGATIVE
NRBC BLD-RTO: 0 /100 WBCS (ref 0–0)
PH UR STRIP.AUTO: 7 [PH]
PLATELET # BLD AUTO: 192 X10*3/UL (ref 150–450)
POTASSIUM SERPL-SCNC: 4.2 MMOL/L (ref 3.5–5.3)
PROT SERPL-MCNC: 7.4 G/DL (ref 6.4–8.2)
PROT UR STRIP.AUTO-MCNC: NEGATIVE MG/DL
RBC # BLD AUTO: 4.38 X10*6/UL (ref 4–5.2)
RBC # UR STRIP.AUTO: NEGATIVE MG/DL
SODIUM SERPL-SCNC: 139 MMOL/L (ref 136–145)
SP GR UR STRIP.AUTO: 1.02
UROBILINOGEN UR STRIP.AUTO-MCNC: NORMAL MG/DL
WBC # BLD AUTO: 6 X10*3/UL (ref 4.4–11.3)

## 2025-02-04 PROCEDURE — 96372 THER/PROPH/DIAG INJ SC/IM: CPT | Performed by: PHYSICIAN ASSISTANT

## 2025-02-04 PROCEDURE — 36415 COLL VENOUS BLD VENIPUNCTURE: CPT | Performed by: PHYSICIAN ASSISTANT

## 2025-02-04 PROCEDURE — 2500000004 HC RX 250 GENERAL PHARMACY W/ HCPCS (ALT 636 FOR OP/ED): Performed by: PHYSICIAN ASSISTANT

## 2025-02-04 PROCEDURE — 80053 COMPREHEN METABOLIC PANEL: CPT | Performed by: PHYSICIAN ASSISTANT

## 2025-02-04 PROCEDURE — 76830 TRANSVAGINAL US NON-OB: CPT

## 2025-02-04 PROCEDURE — 83690 ASSAY OF LIPASE: CPT | Performed by: PHYSICIAN ASSISTANT

## 2025-02-04 PROCEDURE — 99285 EMERGENCY DEPT VISIT HI MDM: CPT | Mod: 25

## 2025-02-04 PROCEDURE — 76856 US EXAM PELVIC COMPLETE: CPT | Performed by: RADIOLOGY

## 2025-02-04 PROCEDURE — 76830 TRANSVAGINAL US NON-OB: CPT | Performed by: RADIOLOGY

## 2025-02-04 PROCEDURE — 85025 COMPLETE CBC W/AUTO DIFF WBC: CPT | Performed by: PHYSICIAN ASSISTANT

## 2025-02-04 PROCEDURE — 81003 URINALYSIS AUTO W/O SCOPE: CPT | Performed by: PHYSICIAN ASSISTANT

## 2025-02-04 PROCEDURE — 74176 CT ABD & PELVIS W/O CONTRAST: CPT

## 2025-02-04 RX ORDER — KETOROLAC TROMETHAMINE 30 MG/ML
15 INJECTION, SOLUTION INTRAMUSCULAR; INTRAVENOUS ONCE
Status: COMPLETED | OUTPATIENT
Start: 2025-02-04 | End: 2025-02-04

## 2025-02-04 RX ORDER — NAPROXEN 500 MG/1
500 TABLET ORAL
Qty: 10 TABLET | Refills: 0 | Status: SHIPPED | OUTPATIENT
Start: 2025-02-04 | End: 2025-02-09

## 2025-02-04 RX ORDER — KETOROLAC TROMETHAMINE 30 MG/ML
30 INJECTION, SOLUTION INTRAMUSCULAR; INTRAVENOUS ONCE
Status: DISCONTINUED | OUTPATIENT
Start: 2025-02-04 | End: 2025-02-04

## 2025-02-04 RX ADMIN — KETOROLAC TROMETHAMINE 15 MG: 30 INJECTION, SOLUTION INTRAMUSCULAR at 19:36

## 2025-02-04 ASSESSMENT — PAIN SCALES - GENERAL
PAINLEVEL_OUTOF10: 0 - NO PAIN
PAINLEVEL_OUTOF10: 10 - WORST POSSIBLE PAIN

## 2025-02-04 ASSESSMENT — PAIN DESCRIPTION - LOCATION: LOCATION: ABDOMEN

## 2025-02-04 ASSESSMENT — LIFESTYLE VARIABLES
TOTAL SCORE: 0
HAVE YOU EVER FELT YOU SHOULD CUT DOWN ON YOUR DRINKING: NO
EVER HAD A DRINK FIRST THING IN THE MORNING TO STEADY YOUR NERVES TO GET RID OF A HANGOVER: NO
EVER FELT BAD OR GUILTY ABOUT YOUR DRINKING: NO
HAVE PEOPLE ANNOYED YOU BY CRITICIZING YOUR DRINKING: NO

## 2025-02-04 ASSESSMENT — COLUMBIA-SUICIDE SEVERITY RATING SCALE - C-SSRS
1. IN THE PAST MONTH, HAVE YOU WISHED YOU WERE DEAD OR WISHED YOU COULD GO TO SLEEP AND NOT WAKE UP?: NO
2. HAVE YOU ACTUALLY HAD ANY THOUGHTS OF KILLING YOURSELF?: NO
6. HAVE YOU EVER DONE ANYTHING, STARTED TO DO ANYTHING, OR PREPARED TO DO ANYTHING TO END YOUR LIFE?: NO

## 2025-02-04 ASSESSMENT — PAIN - FUNCTIONAL ASSESSMENT: PAIN_FUNCTIONAL_ASSESSMENT: 0-10

## 2025-02-04 NOTE — Clinical Note
Eveline Hauser was seen and treated in our emergency department on 2/4/2025.  She may return to work on 02/06/2025.       If you have any questions or concerns, please don't hesitate to call.      Kecia Vasquez PA-C

## 2025-02-04 NOTE — ED PROVIDER NOTES
"HPI   Chief Complaint   Patient presents with    Abdominal Pain     \"Here for left side/stomach pain.'       44-year-old female with a history of hypertension, acid reflux presenting to the ER today with pain in her left lower side and left lower abdomen.  The pain started yesterday without any fall or injury.  She states her bowel movements have been regular and she is passing gas.  No dark or bloody stools.  She has no concerns for pregnancy she has a previous hysterectomy.  She has not had any other abdominal surgeries.  She states the pain is worse when she moves and walks.  She has not had associated fever, nausea or vomiting.  She denies seeing any blood in her urine having painful urination.  She has not had any medicine for relief of pain today.  No further complaints.      History provided by:  Patient          Patient History   Past Medical History:   Diagnosis Date    Hypertension      History reviewed. No pertinent surgical history.  No family history on file.  Social History     Tobacco Use    Smoking status: Never    Smokeless tobacco: Never   Vaping Use    Vaping status: Never Used   Substance Use Topics    Alcohol use: Yes    Drug use: Never       Physical Exam   ED Triage Vitals [02/04/25 1414]   Temperature Heart Rate Respirations BP   36.3 °C (97.3 °F) 75 20 158/64      Pulse Ox Temp Source Heart Rate Source Patient Position   95 % Temporal Monitor Sitting      BP Location FiO2 (%)     Right arm --       Physical Exam  Constitutional:       General: She is not in acute distress.  Eyes:      Conjunctiva/sclera: Conjunctivae normal.   Cardiovascular:      Rate and Rhythm: Normal rate and regular rhythm.      Pulses: Normal pulses.      Heart sounds: Normal heart sounds.   Pulmonary:      Effort: Pulmonary effort is normal.      Breath sounds: Normal breath sounds.   Abdominal:      General: Bowel sounds are normal. There is no distension.      Palpations: Abdomen is soft.      Tenderness: There is " abdominal tenderness. There is no right CVA tenderness, left CVA tenderness, guarding or rebound.      Comments: Tenderness in the left lower flank, LLQ without guarding or rebound.   Musculoskeletal:      Comments: Normal gait and strength tone, no thoracic or lumbar spinal tenderness or step offs. No signs of trauma.   Skin:     General: Skin is warm.   Neurological:      Mental Status: She is alert and oriented to person, place, and time.      Comments: Speech normal           ED Course & MDM   Diagnoses as of 02/04/25 1913   Cyst of left ovary                 No data recorded                                 Medical Decision Making  44-year-old female with history of HTN, GERD, previous hysterectomy presenting to the ER today with pain in her left lower flank, left lower abdomen that started yesterday without fall or injury.  The pain has been constant and she has not taken any medicine for relief.  The pain is made worse by moving and walking, better with rest.  She otherwise denies any further associated symptoms and arrives afebrile with stable vital signs.  Patient ambulates into the exam room without difficulty and is resting without signs of acute distress.  On my exam heart RRR, lungs are clear and there is no CVA tenderness.  Abdomen is soft and nondistended with normal bowel sounds and she is tender in the left lower flank, left lower quadrant without guarding or rebound. Exam is otherwise within normal limits. Laboratory studies, urinalysis and CT are ordered.      CBC with stable H&H and no leukocytosis.  CMP with glucose of 129.  Stable renal function no other acute electrolyte abnormality.  Urinalysis today without infection or hematuria but there is glucosuria.  Lipase is 32.  Labs this time are otherwise within normal limits.  CT abdomen pelvis was performed today and shows a 4.2 left adnexal cystic lesion and recommends pelvic ultrasound.  There is hepatic steatosis.  The appendix was not  identified however patient is not having any right lower quadrant pain or tenderness.  She is having tenderness over this adnexal lesion so we will obtain ultrasound at this time.    Pelvic ultrasound was performed today that demonstrates a left ovarian cyst with mildly thickened septation demonstrating flow.  There is no evidence of torsion.  It does recommend follow-up.  On reassessment patient is resting comfortably.  I did discuss her results with her today, diagnosis and treatment plan home-going.  Patient tells me that she does see a gynecologist at [facility] and she will call them tomorrow for close follow-up.  I did discuss with her treatment plan home-going as well as warning signs to return to the ED and she expressed understanding and agreed with the plan of care today.      Labs Reviewed   COMPREHENSIVE METABOLIC PANEL - Abnormal       Result Value    Glucose 129 (*)     Sodium 139      Potassium 4.2      Chloride 109 (*)     Bicarbonate 21      Anion Gap 13      Urea Nitrogen 12      Creatinine 0.82      eGFR >90      Calcium 9.2      Albumin 4.0      Alkaline Phosphatase 48      Total Protein 7.4      AST 30      Bilirubin, Total 0.4      ALT 34     URINALYSIS WITH REFLEX CULTURE AND MICROSCOPIC - Abnormal    Color, Urine Light-Yellow      Appearance, Urine Clear      Specific Gravity, Urine 1.022      pH, Urine 7.0      Protein, Urine NEGATIVE      Glucose, Urine OVER (4+) (*)     Blood, Urine NEGATIVE      Ketones, Urine NEGATIVE      Bilirubin, Urine NEGATIVE      Urobilinogen, Urine Normal      Nitrite, Urine NEGATIVE      Leukocyte Esterase, Urine NEGATIVE      Narrative:     OVER is reported when the result is greater than the clinically reportable range.   LIPASE - Normal    Lipase 32      Narrative:     Venipuncture immediately after or during the administration of Metamizole may lead to falsely low results. Testing should be performed immediately prior to Metamizole dosing.   CBC WITH AUTO  DIFFERENTIAL    WBC 6.0      nRBC 0.0      RBC 4.38      Hemoglobin 12.4      Hematocrit 38.2      MCV 87      MCH 28.3      MCHC 32.5      RDW 13.6      Platelets 192      Neutrophils % 51.9      Immature Granulocytes %, Automated 0.8      Lymphocytes % 37.5      Monocytes % 8.5      Eosinophils % 0.8      Basophils % 0.5      Neutrophils Absolute 3.13      Immature Granulocytes Absolute, Automated 0.05      Lymphocytes Absolute 2.26      Monocytes Absolute 0.51      Eosinophils Absolute 0.05      Basophils Absolute 0.03     URINALYSIS WITH REFLEX CULTURE AND MICROSCOPIC    Narrative:     The following orders were created for panel order Urinalysis with Reflex Culture and Microscopic.  Procedure                               Abnormality         Status                     ---------                               -----------         ------                     Urinalysis with Reflex C...[[id number]]  Abnormal            Final result               Extra Urine Gray Tube[[id number]]                            In process                   Please view results for these tests on the individual orders.   EXTRA URINE GRAY TUBE       US PELVIS TRANSABDOMINAL WITH TRANSVAGINAL   Final Result   Left ovarian cyst with a mildly thickened septation demonstrating   flow. Recommend 8-12 week ultrasound to assess for resolution, and if   persistent then follow-up with contrast-enhanced MRI pelvis would be   recommended.        Signed by: Bayron Sales 2/4/2025 6:32 PM   Dictation workstation:   [id number]      CT abdomen pelvis wo IV contrast   Final Result   1.  4.2 cm left adnexal cystic lesion, presumed ovarian in nature.   Pelvic ultrasound is advised for further assessment.   2. Hepatic steatosis.   3. Unremarkable appendix.             Signed by: Nixon Page 2/4/2025 5:09 PM   Dictation workstation:   [id number]            Procedure  Procedures     Kecia Vasquez PA-C  02/04/25 1923

## 2025-02-05 ENCOUNTER — OFFICE VISIT (OUTPATIENT)
Dept: OBGYN CLINIC | Age: 45
End: 2025-02-05
Payer: MEDICAID

## 2025-02-05 VITALS
HEART RATE: 76 BPM | WEIGHT: 247 LBS | HEIGHT: 59 IN | DIASTOLIC BLOOD PRESSURE: 80 MMHG | SYSTOLIC BLOOD PRESSURE: 132 MMHG | BODY MASS INDEX: 49.8 KG/M2

## 2025-02-05 DIAGNOSIS — N83.209 CYST OF OVARY, UNSPECIFIED LATERALITY: Primary | ICD-10-CM

## 2025-02-05 LAB — HOLD SPECIMEN: NORMAL

## 2025-02-05 PROCEDURE — 99213 OFFICE O/P EST LOW 20 MIN: CPT | Performed by: OBSTETRICS & GYNECOLOGY

## 2025-02-05 PROCEDURE — 3075F SYST BP GE 130 - 139MM HG: CPT | Performed by: OBSTETRICS & GYNECOLOGY

## 2025-02-05 PROCEDURE — 3079F DIAST BP 80-89 MM HG: CPT | Performed by: OBSTETRICS & GYNECOLOGY

## 2025-02-05 RX ORDER — ACETAMINOPHEN 500 MG
1000 TABLET ORAL EVERY 6 HOURS PRN
Qty: 60 TABLET | Refills: 1 | Status: SHIPPED | OUTPATIENT
Start: 2025-02-05

## 2025-02-05 RX ORDER — DROSPIRENONE AND ETHINYL ESTRADIOL 0.03MG-3MG
1 KIT ORAL DAILY
Qty: 1 PACKET | Refills: 14 | Status: SHIPPED | OUTPATIENT
Start: 2025-02-05

## 2025-02-05 RX ORDER — KETOROLAC TROMETHAMINE 10 MG/1
10 TABLET, FILM COATED ORAL EVERY 6 HOURS PRN
Qty: 40 TABLET | Refills: 1 | Status: SHIPPED | OUTPATIENT
Start: 2025-02-05

## 2025-02-05 SDOH — ECONOMIC STABILITY: TRANSPORTATION INSECURITY
IN THE PAST 12 MONTHS, HAS THE LACK OF TRANSPORTATION KEPT YOU FROM MEDICAL APPOINTMENTS OR FROM GETTING MEDICATIONS?: YES

## 2025-02-05 SDOH — ECONOMIC STABILITY: FOOD INSECURITY: WITHIN THE PAST 12 MONTHS, THE FOOD YOU BOUGHT JUST DIDN'T LAST AND YOU DIDN'T HAVE MONEY TO GET MORE.: PATIENT DECLINED

## 2025-02-05 SDOH — ECONOMIC STABILITY: FOOD INSECURITY: WITHIN THE PAST 12 MONTHS, YOU WORRIED THAT YOUR FOOD WOULD RUN OUT BEFORE YOU GOT MONEY TO BUY MORE.: OFTEN TRUE

## 2025-02-05 SDOH — ECONOMIC STABILITY: INCOME INSECURITY: IN THE LAST 12 MONTHS, WAS THERE A TIME WHEN YOU WERE NOT ABLE TO PAY THE MORTGAGE OR RENT ON TIME?: PATIENT DECLINED

## 2025-02-05 ASSESSMENT — PATIENT HEALTH QUESTIONNAIRE - PHQ9
1. LITTLE INTEREST OR PLEASURE IN DOING THINGS: NOT AT ALL
SUM OF ALL RESPONSES TO PHQ QUESTIONS 1-9: 1
2. FEELING DOWN, DEPRESSED OR HOPELESS: SEVERAL DAYS
SUM OF ALL RESPONSES TO PHQ QUESTIONS 1-9: 1
SUM OF ALL RESPONSES TO PHQ9 QUESTIONS 1 & 2: 1
SUM OF ALL RESPONSES TO PHQ QUESTIONS 1-9: 1
SUM OF ALL RESPONSES TO PHQ QUESTIONS 1-9: 1
1. LITTLE INTEREST OR PLEASURE IN DOING THINGS: NOT AT ALL
2. FEELING DOWN, DEPRESSED OR HOPELESS: SEVERAL DAYS
SUM OF ALL RESPONSES TO PHQ9 QUESTIONS 1 & 2: 1

## 2025-02-11 NOTE — PROGRESS NOTES
Ramila Perez is a 44 y.o. female who presents here today for complaints of Follow-Up from Hospital (Seen at [facility] ED 2/4/25 for pelvic pain. Labs, CT and US done. She states she has ovarian cysts and they cause her so much pain, she has a hard time walking and moving. )        History of Present Illness  The patient presents for evaluation of ovarian cysts.    She reports the onset of pain a few days ago, which she initially disregarded. However, the pain intensified after her work shift, prompting her boyfriend to suggest an emergency room visit, which she declined due to work commitments. The following day, the pain escalated, necessitating an ambulance call and subsequent emergency room visit. She describes the pain as severe, localized to her left side, and debilitating to the point of impairing her mobility. A CT scan performed in the emergency room revealed two ovarian cysts. She is not currently on any form of birth control. She expresses concern about the potential need for surgical intervention to remove the cysts and is interested in exploring non-surgical options. She is also curious about the possibility of monitoring the cysts to determine if they resolve spontaneously. She has been prescribed naproxen for pain management but is awaiting its availability at the pharmacy. She has a history of hives with PERCOCET and codeine but not with Tylenol. She underwent a hysterectomy a few years prior, with her ovaries being preserved.    ALLERGIES  The patient has hives with PERCOCET and CODEINE.    MEDICATIONS  Current: Naproxen         Vitals:  /80 (Site: Right Upper Arm, Position: Sitting, Cuff Size: Large Adult)   Pulse 76   Ht 1.499 m (4' 11\")   Wt 112 kg (247 lb)   LMP 08/19/2020   BMI 49.89 kg/m²   Allergies:  Metronidazole and Percocet [oxycodone-acetaminophen]  Past Medical History:   Diagnosis Date    Abnormal Pap smear of cervix     Breast disorder     Chronic back pain

## 2025-03-07 ENCOUNTER — HOSPITAL ENCOUNTER (OUTPATIENT)
Dept: ULTRASOUND IMAGING | Age: 45
Discharge: HOME | End: 2025-03-09
Attending: OBSTETRICS & GYNECOLOGY
Payer: MEDICAID

## 2025-03-07 DIAGNOSIS — N83.209 CYST OF OVARY, UNSPECIFIED LATERALITY: ICD-10-CM

## 2025-03-07 PROCEDURE — 76830 TRANSVAGINAL US NON-OB: CPT

## 2025-03-07 PROCEDURE — 76856 US EXAM PELVIC COMPLETE: CPT

## 2025-03-07 PROCEDURE — 93975 VASCULAR STUDY: CPT

## 2025-03-20 ENCOUNTER — TELEPHONE (OUTPATIENT)
Age: 45
End: 2025-03-20

## 2025-03-20 NOTE — TELEPHONE ENCOUNTER
----- Message from Salvador LUNDBERG sent at 3/19/2025  8:37 AM EDT -----  Regarding: ECC Message to Provider  ECC Message to Provider    Relationship to Patient: Self     Additional Information : patient is requesting  an earlier appointment, if there is a cancellation     --------------------------------------------------------------------------------------------------------------------------    Call Back Information: OK to leave message on voicemail  Preferred Call Back Number: [phone] (home)

## 2025-03-24 ENCOUNTER — OFFICE VISIT (OUTPATIENT)
Dept: OBGYN CLINIC | Age: 45
End: 2025-03-24
Payer: COMMERCIAL

## 2025-03-24 VITALS
SYSTOLIC BLOOD PRESSURE: 138 MMHG | HEART RATE: 84 BPM | BODY MASS INDEX: 50.4 KG/M2 | WEIGHT: 250 LBS | HEIGHT: 59 IN | DIASTOLIC BLOOD PRESSURE: 84 MMHG

## 2025-03-24 DIAGNOSIS — N83.209 CYST OF OVARY, UNSPECIFIED LATERALITY: ICD-10-CM

## 2025-03-24 PROCEDURE — G8417 CALC BMI ABV UP PARAM F/U: HCPCS | Performed by: OBSTETRICS & GYNECOLOGY

## 2025-03-24 PROCEDURE — G8427 DOCREV CUR MEDS BY ELIG CLIN: HCPCS | Performed by: OBSTETRICS & GYNECOLOGY

## 2025-03-24 PROCEDURE — 3075F SYST BP GE 130 - 139MM HG: CPT | Performed by: OBSTETRICS & GYNECOLOGY

## 2025-03-24 PROCEDURE — 99213 OFFICE O/P EST LOW 20 MIN: CPT | Performed by: OBSTETRICS & GYNECOLOGY

## 2025-03-24 PROCEDURE — 3079F DIAST BP 80-89 MM HG: CPT | Performed by: OBSTETRICS & GYNECOLOGY

## 2025-03-24 PROCEDURE — 1036F TOBACCO NON-USER: CPT | Performed by: OBSTETRICS & GYNECOLOGY

## 2025-03-24 NOTE — PROGRESS NOTES
assessment.      The patient (or guardian, if applicable) and other individuals in attendance with the patient were advised that Artificial Intelligence will be utilized during this visit to record, process the conversation to generate a clinical note, and support improvement of the AI technology. The patient (or guardian, if applicable) and other individuals in attendance at the appointment consented to the use of AI, including the recording.      She Casas MD

## 2025-04-16 ENCOUNTER — TELEPHONE (OUTPATIENT)
Age: 45
End: 2025-04-16

## 2025-04-16 NOTE — TELEPHONE ENCOUNTER
----- Message from Uday GARCIA sent at 4/14/2025  8:07 AM EDT -----  Regarding: ECC Appointment Request  ECC Appointment Request    Patient needs appointment for ECC Appointment Type: Existing Condition Follow Up.    Patient Requested Dates(s): April or May 2025  Patient Requested Time: morning  Provider Name:  Aida Ramos PA-C    Reason for Appointment Request: Established Patient - Available appointments did not meet patient need  --------------------------------------------------------------------------------------------------------------------------    Relationship to Patient: Self     Call Back Information: OK to leave message on voicemail  Preferred Call Back Number: Phone : [phone]

## 2025-05-14 ENCOUNTER — TELEPHONE (OUTPATIENT)
Age: 45
End: 2025-05-14

## (undated) DEVICE — BAG SPEC RETRIEVALXL C2000ML MOUTH 14MM L27CM SHFT 15MM NYL

## (undated) DEVICE — SPONGE,LAP,18"X18",DLX,XR,ST,5/PK,40/PK: Brand: MEDLINE

## (undated) DEVICE — APPLICATOR MEDICATED 26 CC SOLUTION HI LT ORNG CHLORAPREP

## (undated) DEVICE — COVER,TABLE,44X90,STERILE: Brand: MEDLINE

## (undated) DEVICE — GAUZE,SPONGE,4"X4",16PLY,XRAY,STRL,LF: Brand: MEDLINE

## (undated) DEVICE — SHEARS ENDOSCP HARM 36CM ULTRASONIC CRV TIP UPGRD

## (undated) DEVICE — ADHESIVE SKIN CLSR 0.7ML TOP DERMBND ADV

## (undated) DEVICE — SUTURE MCRYL SZ 4-0 L27IN ABSRB UD L19MM PS-2 1/2 CIR PRIM Y426H

## (undated) DEVICE — Device

## (undated) DEVICE — TOTAL TRAY, DB, 100% SILI FOLEY, 16FR 10: Brand: MEDLINE

## (undated) DEVICE — SUTURE VCRL SZ 0 L36IN ABSRB UD L36MM CT-1 1/2 CIR J946H

## (undated) DEVICE — INTENDED FOR TISSUE SEPARATION, AND OTHER PROCEDURES THAT REQUIRE A SHARP SURGICAL BLADE TO PUNCTURE OR CUT.: Brand: BARD-PARKER ® CARBON RIB-BACK BLADES

## (undated) DEVICE — GLOVE SURG SZ 85 L12IN FNGR THK94MIL TRNSLUC YEL LTX

## (undated) DEVICE — SYRINGE MED 10ML TRNSLUC BRL PLUNG BLK MRK POLYPR CTRL

## (undated) DEVICE — DEVICE TRCR 12X9X3IN WHT CLSR DISP OMNICLOSE

## (undated) DEVICE — SHEET,DRAPE,53X77,STERILE: Brand: MEDLINE

## (undated) DEVICE — GLOVE SURG SZ 9 THK91MIL LTX FREE SYN POLYISOPRENE ANTI

## (undated) DEVICE — TROCAR: Brand: KII FIOS FIRST ENTRY

## (undated) DEVICE — PAD BD FOAM 33X72X2.75 IN BLU EGGCRATE

## (undated) DEVICE — POUCH, INSTRUMENT, 3POCKET, INVISISHIELD: Brand: MEDLINE

## (undated) DEVICE — TOWEL,OR,DSP,ST,BLUE,STD,4/PK,20PK/CS: Brand: MEDLINE

## (undated) DEVICE — KIT,ANTI FOG,W/SPONGE & FLUID,SOFT PACK: Brand: MEDLINE

## (undated) DEVICE — COUNTER NDL 40 COUNT HLD 70 FOAM BLK ADH W/ MAG

## (undated) DEVICE — GOWN,SIRUS,POLYRNF,BRTHSLV,XLN/XL,20/CS: Brand: MEDLINE

## (undated) DEVICE — AGENT HEMSTAT 3GM OXIDIZED REGENERATED CELOS ABSRB FOR CONT (ORDER MULTIPLES OF 5EA)

## (undated) DEVICE — LABEL MED MINI W/ MARKER

## (undated) DEVICE — ELECTRODE PT RET AD L9FT HI MOIST COND ADH HYDRGEL CORDED

## (undated) DEVICE — Z DUPLICATE USE 2431315 SET INSUF TBNG HI FLO W/ SMK EVAC FOR PNEUMOCLEAR

## (undated) DEVICE — YANKAUER,SMOOTH HANDLE,HIGH CAPACITY: Brand: MEDLINE INDUSTRIES, INC.

## (undated) DEVICE — WARMER SCP 2 ANTIFOG LAP DISP

## (undated) DEVICE — 4-PORT MANIFOLD: Brand: NEPTUNE 2

## (undated) DEVICE — VCARE MEDIUM, UTERINE MANIPULATOR, VAGINAL-CERVICAL-AHLUWALIA'S-RETRACTOR-ELEVATOR: Brand: VCARE

## (undated) DEVICE — LINER PAD CONTOUR SUPER PEACH 7X14IN

## (undated) DEVICE — NEEDLE INSUF L120MM ULT VERES ENDOPATH

## (undated) DEVICE — PACK,BASIC IV,SIRUS: Brand: MEDLINE

## (undated) DEVICE — COVER LT HNDL BLU PLAS

## (undated) DEVICE — TROCAR: Brand: KII® SLEEVE

## (undated) DEVICE — TUBING, SUCTION, 1/4" X 10', STRAIGHT: Brand: MEDLINE

## (undated) DEVICE — GOWN,AURORA,NONREINFORCED,LARGE: Brand: MEDLINE

## (undated) DEVICE — SUTURE PDS II SZ 1 L36IN ABSRB VLT CT L40MM 1/2 CIR TAPR Z359T

## (undated) DEVICE — DRAPE, LAVH, STERILE: Brand: MEDLINE

## (undated) DEVICE — SURGICEL ENDOSCP APPL

## (undated) DEVICE — TRAY PREP DRY W/ PREM GLV 2 APPL 6 SPNG 2 UNDPD 1 OVERWRAP

## (undated) DEVICE — PACK,SET UP,NO DRAPES: Brand: MEDLINE